# Patient Record
Sex: FEMALE | Race: BLACK OR AFRICAN AMERICAN | NOT HISPANIC OR LATINO | Employment: FULL TIME | URBAN - METROPOLITAN AREA
[De-identification: names, ages, dates, MRNs, and addresses within clinical notes are randomized per-mention and may not be internally consistent; named-entity substitution may affect disease eponyms.]

---

## 2023-02-23 LAB
ALANINE AMINOTRANSFERASE (SGPT) (U/L) IN SER/PLAS: 15 U/L (ref 7–45)
ALBUMIN (G/DL) IN SER/PLAS: 4.2 G/DL (ref 3.4–5)
ALKALINE PHOSPHATASE (U/L) IN SER/PLAS: 120 U/L (ref 33–136)
ANION GAP IN SER/PLAS: 13 MMOL/L (ref 10–20)
ASPARTATE AMINOTRANSFERASE (SGOT) (U/L) IN SER/PLAS: 14 U/L (ref 9–39)
BASOPHILS (10*3/UL) IN BLOOD BY AUTOMATED COUNT: 0.05 X10E9/L (ref 0–0.1)
BASOPHILS/100 LEUKOCYTES IN BLOOD BY AUTOMATED COUNT: 0.5 % (ref 0–2)
BILIRUBIN TOTAL (MG/DL) IN SER/PLAS: 0.6 MG/DL (ref 0–1.2)
C REACTIVE PROTEIN (MG/L) IN SER/PLAS: 0.96 MG/DL
CALCIUM (MG/DL) IN SER/PLAS: 9.9 MG/DL (ref 8.6–10.6)
CARBON DIOXIDE, TOTAL (MMOL/L) IN SER/PLAS: 28 MMOL/L (ref 21–32)
CHLORIDE (MMOL/L) IN SER/PLAS: 104 MMOL/L (ref 98–107)
CREATININE (MG/DL) IN SER/PLAS: 1.28 MG/DL (ref 0.5–1.05)
EOSINOPHILS (10*3/UL) IN BLOOD BY AUTOMATED COUNT: 0.22 X10E9/L (ref 0–0.7)
EOSINOPHILS/100 LEUKOCYTES IN BLOOD BY AUTOMATED COUNT: 2.1 % (ref 0–6)
ERYTHROCYTE DISTRIBUTION WIDTH (RATIO) BY AUTOMATED COUNT: 14.9 % (ref 11.5–14.5)
ERYTHROCYTE MEAN CORPUSCULAR HEMOGLOBIN CONCENTRATION (G/DL) BY AUTOMATED: 32.9 G/DL (ref 32–36)
ERYTHROCYTE MEAN CORPUSCULAR VOLUME (FL) BY AUTOMATED COUNT: 92 FL (ref 80–100)
ERYTHROCYTES (10*6/UL) IN BLOOD BY AUTOMATED COUNT: 4.57 X10E12/L (ref 4–5.2)
GFR FEMALE: 47 ML/MIN/1.73M2
GLUCOSE (MG/DL) IN SER/PLAS: 91 MG/DL (ref 74–99)
HEMATOCRIT (%) IN BLOOD BY AUTOMATED COUNT: 41.9 % (ref 36–46)
HEMOGLOBIN (G/DL) IN BLOOD: 13.8 G/DL (ref 12–16)
IMMATURE GRANULOCYTES/100 LEUKOCYTES IN BLOOD BY AUTOMATED COUNT: 0.3 % (ref 0–0.9)
LEUKOCYTES (10*3/UL) IN BLOOD BY AUTOMATED COUNT: 10.6 X10E9/L (ref 4.4–11.3)
LYMPHOCYTES (10*3/UL) IN BLOOD BY AUTOMATED COUNT: 3.01 X10E9/L (ref 1.2–4.8)
LYMPHOCYTES/100 LEUKOCYTES IN BLOOD BY AUTOMATED COUNT: 28.5 % (ref 13–44)
MONOCYTES (10*3/UL) IN BLOOD BY AUTOMATED COUNT: 0.95 X10E9/L (ref 0.1–1)
MONOCYTES/100 LEUKOCYTES IN BLOOD BY AUTOMATED COUNT: 9 % (ref 2–10)
NEUTROPHILS (10*3/UL) IN BLOOD BY AUTOMATED COUNT: 6.3 X10E9/L (ref 1.2–7.7)
NEUTROPHILS/100 LEUKOCYTES IN BLOOD BY AUTOMATED COUNT: 59.6 % (ref 40–80)
NRBC (PER 100 WBCS) BY AUTOMATED COUNT: 0 /100 WBC (ref 0–0)
PLATELETS (10*3/UL) IN BLOOD AUTOMATED COUNT: 315 X10E9/L (ref 150–450)
POTASSIUM (MMOL/L) IN SER/PLAS: 3.9 MMOL/L (ref 3.5–5.3)
PROTEIN TOTAL: 6.7 G/DL (ref 6.4–8.2)
SODIUM (MMOL/L) IN SER/PLAS: 141 MMOL/L (ref 136–145)
THYROTROPIN (MIU/L) IN SER/PLAS BY DETECTION LIMIT <= 0.05 MIU/L: 1.41 MIU/L (ref 0.44–3.98)
UREA NITROGEN (MG/DL) IN SER/PLAS: 19 MG/DL (ref 6–23)

## 2023-02-24 LAB — TISSUE TRANSGLUTAMINASE, IGA: <1 U/ML (ref 0–14)

## 2023-04-08 LAB
C. DIFFICILE TOXIN, PCR: NOT DETECTED
CAMPYLOBACTER GP: NOT DETECTED
NOROVIRUS GI/GII: NOT DETECTED
ROTAVIRUS A: NOT DETECTED
SALMONELLA SP.: NOT DETECTED
SHIGA TOXIN 1: NOT DETECTED
SHIGA TOXIN 2: NOT DETECTED
SHIGELLA SP.: NOT DETECTED
VIBRIO GRP.: NOT DETECTED
YERSINIA ENTEROCOLITICA: NOT DETECTED

## 2023-04-11 LAB
FAT, FECAL - NEUTRAL: NORMAL
FAT, FECAL - SPLIT: NORMAL

## 2023-04-12 LAB — PANCREATIC ELASTASE, FECAL: 689 UG/G

## 2023-10-17 PROBLEM — J32.9 CHRONIC SINUSITIS: Status: ACTIVE | Noted: 2023-10-17

## 2023-10-17 PROBLEM — G89.29 CHRONIC PAIN OF RIGHT ANKLE: Status: ACTIVE | Noted: 2023-10-17

## 2023-10-17 PROBLEM — J44.9 COPD (CHRONIC OBSTRUCTIVE PULMONARY DISEASE) (MULTI): Status: ACTIVE | Noted: 2023-10-17

## 2023-10-17 PROBLEM — M16.12 ARTHRITIS OF LEFT HIP: Status: ACTIVE | Noted: 2023-10-17

## 2023-10-17 PROBLEM — I35.1 MILD AORTIC INSUFFICIENCY: Status: ACTIVE | Noted: 2023-10-17

## 2023-10-17 PROBLEM — R42 DIZZINESS: Status: ACTIVE | Noted: 2023-10-17

## 2023-10-17 PROBLEM — M81.0 POSTMENOPAUSAL OSTEOPOROSIS: Status: ACTIVE | Noted: 2023-10-17

## 2023-10-17 PROBLEM — R91.1 LUNG NODULE: Status: ACTIVE | Noted: 2023-10-17

## 2023-10-17 PROBLEM — K57.32 DIVERTICULITIS OF RECTOSIGMOID: Status: ACTIVE | Noted: 2023-10-17

## 2023-10-17 PROBLEM — N60.99 ATYPICAL LOBULAR HYPERPLASIA OF BREAST: Status: ACTIVE | Noted: 2023-10-17

## 2023-10-17 PROBLEM — G47.33 OSA (OBSTRUCTIVE SLEEP APNEA): Status: ACTIVE | Noted: 2023-10-17

## 2023-10-17 PROBLEM — E11.9 TYPE 2 DIABETES MELLITUS (MULTI): Status: ACTIVE | Noted: 2023-10-17

## 2023-10-17 PROBLEM — M87.051: Status: ACTIVE | Noted: 2023-10-17

## 2023-10-17 PROBLEM — E79.0 ABNORMAL BLOOD LEVEL OF URIC ACID: Status: ACTIVE | Noted: 2023-10-17

## 2023-10-17 PROBLEM — M54.16 LUMBAR RADICULOPATHY: Status: ACTIVE | Noted: 2023-10-17

## 2023-10-17 PROBLEM — R79.89 ELEVATED SERUM CREATININE: Status: ACTIVE | Noted: 2023-10-17

## 2023-10-17 PROBLEM — K90.9 STEATORRHEA (HHS-HCC): Status: ACTIVE | Noted: 2023-10-17

## 2023-10-17 PROBLEM — M10.9 GOUT: Status: ACTIVE | Noted: 2023-10-17

## 2023-10-17 PROBLEM — E66.9 OBESITY (BMI 30-39.9): Status: ACTIVE | Noted: 2023-10-17

## 2023-10-17 PROBLEM — R92.8 ABNORMAL MAMMOGRAM OF LEFT BREAST: Status: ACTIVE | Noted: 2023-10-17

## 2023-10-17 PROBLEM — I25.10 ATHEROSCLEROSIS OF NATIVE CORONARY ARTERY OF NATIVE HEART WITHOUT ANGINA PECTORIS: Status: ACTIVE | Noted: 2023-10-17

## 2023-10-17 PROBLEM — L30.9 ECZEMA: Status: ACTIVE | Noted: 2023-10-17

## 2023-10-17 PROBLEM — M10.9 URIC ACID ARTHROPATHY: Status: ACTIVE | Noted: 2023-10-17

## 2023-10-17 PROBLEM — I21.4 NSTEMI, INITIAL EPISODE OF CARE (MULTI): Status: ACTIVE | Noted: 2023-10-17

## 2023-10-17 PROBLEM — R30.0 DYSURIA: Status: ACTIVE | Noted: 2023-10-17

## 2023-10-17 PROBLEM — I10 BENIGN ESSENTIAL HYPERTENSION: Status: ACTIVE | Noted: 2023-10-17

## 2023-10-17 PROBLEM — K57.90 DIVERTICULOSIS: Status: ACTIVE | Noted: 2023-10-17

## 2023-10-17 PROBLEM — R31.0 GROSS HEMATURIA: Status: ACTIVE | Noted: 2023-10-17

## 2023-10-17 PROBLEM — K63.5 POLYP OF SIGMOID COLON: Status: ACTIVE | Noted: 2023-10-17

## 2023-10-17 PROBLEM — F43.9 STRESS: Status: ACTIVE | Noted: 2023-10-17

## 2023-10-17 PROBLEM — E55.9 VITAMIN D DEFICIENCY: Status: ACTIVE | Noted: 2023-10-17

## 2023-10-17 PROBLEM — R35.0 URINE FREQUENCY: Status: ACTIVE | Noted: 2023-10-17

## 2023-10-17 PROBLEM — M87.052 AVASCULAR NECROSIS OF BONE OF LEFT HIP (MULTI): Status: ACTIVE | Noted: 2023-10-17

## 2023-10-17 PROBLEM — R31.9 HEMATURIA: Status: ACTIVE | Noted: 2023-10-17

## 2023-10-17 PROBLEM — R06.02 SHORTNESS OF BREATH ON EXERTION: Status: ACTIVE | Noted: 2023-10-17

## 2023-10-17 PROBLEM — M25.571 CHRONIC PAIN OF RIGHT ANKLE: Status: ACTIVE | Noted: 2023-10-17

## 2023-10-17 PROBLEM — R73.09 ABNORMAL BLOOD SUGAR: Status: ACTIVE | Noted: 2023-10-17

## 2023-10-17 PROBLEM — M70.62 TROCHANTERIC BURSITIS OF LEFT HIP: Status: ACTIVE | Noted: 2023-10-17

## 2023-10-17 PROBLEM — E78.5 HYPERLIPEMIA: Status: ACTIVE | Noted: 2023-10-17

## 2023-10-17 PROBLEM — F17.200 CURRENT SMOKER: Status: ACTIVE | Noted: 2023-10-17

## 2023-10-17 PROBLEM — E78.00 HYPERCHOLESTEROLEMIA: Status: ACTIVE | Noted: 2023-10-17

## 2023-10-17 PROBLEM — F17.200 NICOTINE DEPENDENCE: Status: ACTIVE | Noted: 2023-10-17

## 2023-10-17 PROBLEM — I50.32 CHRONIC DIASTOLIC CONGESTIVE HEART FAILURE (MULTI): Status: ACTIVE | Noted: 2023-10-17

## 2023-10-17 PROBLEM — I25.2 PAST MYOCARDIAL INFARCTION: Status: ACTIVE | Noted: 2023-10-17

## 2023-10-17 RX ORDER — CARVEDILOL 3.12 MG/1
1 TABLET ORAL
COMMUNITY
Start: 2022-02-07 | End: 2023-10-18 | Stop reason: ALTCHOICE

## 2023-10-17 RX ORDER — AZELASTINE 1 MG/ML
1 SPRAY, METERED NASAL 2 TIMES DAILY
COMMUNITY
Start: 2019-06-11 | End: 2024-04-09 | Stop reason: SDUPTHER

## 2023-10-17 RX ORDER — NAPROXEN 500 MG/1
500 TABLET ORAL 2 TIMES DAILY
COMMUNITY
Start: 2023-01-24 | End: 2023-10-18 | Stop reason: ALTCHOICE

## 2023-10-17 RX ORDER — EZETIMIBE 10 MG/1
1 TABLET ORAL NIGHTLY
COMMUNITY
Start: 2022-02-07 | End: 2023-10-18 | Stop reason: ALTCHOICE

## 2023-10-17 RX ORDER — ALLOPURINOL 300 MG/1
1 TABLET ORAL DAILY
COMMUNITY
Start: 2022-08-31 | End: 2024-02-06

## 2023-10-17 RX ORDER — ATORVASTATIN CALCIUM 80 MG/1
1 TABLET, FILM COATED ORAL DAILY
COMMUNITY
Start: 2022-02-07 | End: 2024-02-06

## 2023-10-17 RX ORDER — TRIAMCINOLONE ACETONIDE 1 MG/G
CREAM TOPICAL 2 TIMES DAILY
COMMUNITY
Start: 2022-05-04

## 2023-10-17 RX ORDER — FLUTICASONE FUROATE, UMECLIDINIUM BROMIDE AND VILANTEROL TRIFENATATE 200; 62.5; 25 UG/1; UG/1; UG/1
1 POWDER RESPIRATORY (INHALATION) DAILY
COMMUNITY
End: 2024-03-25 | Stop reason: SDUPTHER

## 2023-10-17 RX ORDER — IPRATROPIUM BROMIDE AND ALBUTEROL SULFATE 2.5; .5 MG/3ML; MG/3ML
3 SOLUTION RESPIRATORY (INHALATION) EVERY 6 HOURS PRN
COMMUNITY
Start: 2021-04-12

## 2023-10-17 RX ORDER — ALBUTEROL SULFATE 90 UG/1
1-2 AEROSOL, METERED RESPIRATORY (INHALATION) EVERY 4 HOURS PRN
COMMUNITY
Start: 2017-02-20 | End: 2024-03-25 | Stop reason: SDUPTHER

## 2023-10-17 RX ORDER — CHLORTHALIDONE 25 MG/1
25 TABLET ORAL DAILY
COMMUNITY
End: 2024-03-18 | Stop reason: SDUPTHER

## 2023-10-17 RX ORDER — CLOPIDOGREL BISULFATE 75 MG/1
1 TABLET ORAL DAILY
COMMUNITY
Start: 2022-02-07 | End: 2024-04-04 | Stop reason: ALTCHOICE

## 2023-10-17 RX ORDER — DORZOLAMIDE HCL 20 MG/ML
SOLUTION/ DROPS OPHTHALMIC
COMMUNITY

## 2023-10-17 RX ORDER — ASPIRIN 81 MG/1
1 TABLET ORAL DAILY
COMMUNITY
End: 2024-05-28

## 2023-10-17 RX ORDER — AMLODIPINE BESYLATE 2.5 MG/1
1 TABLET ORAL DAILY
COMMUNITY
Start: 2022-08-31 | End: 2024-03-15 | Stop reason: SDUPTHER

## 2023-10-17 RX ORDER — MECLIZINE HYDROCHLORIDE 25 MG/1
25-50 TABLET ORAL 3 TIMES DAILY PRN
COMMUNITY
Start: 2022-11-17 | End: 2023-10-18 | Stop reason: ALTCHOICE

## 2023-10-17 RX ORDER — NITROGLYCERIN 0.4 MG/1
0.4 TABLET SUBLINGUAL EVERY 5 MIN PRN
COMMUNITY
Start: 2022-02-07

## 2023-10-18 ENCOUNTER — OFFICE VISIT (OUTPATIENT)
Dept: PRIMARY CARE | Facility: CLINIC | Age: 63
End: 2023-10-18
Payer: COMMERCIAL

## 2023-10-18 VITALS
WEIGHT: 240.08 LBS | RESPIRATION RATE: 16 BRPM | HEART RATE: 85 BPM | DIASTOLIC BLOOD PRESSURE: 64 MMHG | SYSTOLIC BLOOD PRESSURE: 147 MMHG | BODY MASS INDEX: 38.75 KG/M2

## 2023-10-18 DIAGNOSIS — E11.9 TYPE 2 DIABETES MELLITUS WITHOUT COMPLICATION, WITHOUT LONG-TERM CURRENT USE OF INSULIN (MULTI): Primary | ICD-10-CM

## 2023-10-18 DIAGNOSIS — M10.9 GOUT OF ANKLE, UNSPECIFIED CAUSE, UNSPECIFIED CHRONICITY, UNSPECIFIED LATERALITY: Primary | ICD-10-CM

## 2023-10-18 DIAGNOSIS — I10 BENIGN ESSENTIAL HYPERTENSION: ICD-10-CM

## 2023-10-18 DIAGNOSIS — I25.10 ATHEROSCLEROSIS OF NATIVE CORONARY ARTERY OF NATIVE HEART WITHOUT ANGINA PECTORIS: ICD-10-CM

## 2023-10-18 DIAGNOSIS — E66.9 OBESITY (BMI 30-39.9): ICD-10-CM

## 2023-10-18 DIAGNOSIS — E78.00 HYPERCHOLESTEROLEMIA: ICD-10-CM

## 2023-10-18 DIAGNOSIS — R10.30 LOWER ABDOMINAL PAIN: ICD-10-CM

## 2023-10-18 PROBLEM — R10.2 PELVIC PAIN: Status: ACTIVE | Noted: 2023-10-18

## 2023-10-18 PROCEDURE — 1125F AMNT PAIN NOTED PAIN PRSNT: CPT | Performed by: INTERNAL MEDICINE

## 2023-10-18 PROCEDURE — 3078F DIAST BP <80 MM HG: CPT | Performed by: INTERNAL MEDICINE

## 2023-10-18 PROCEDURE — 99214 OFFICE O/P EST MOD 30 MIN: CPT | Performed by: INTERNAL MEDICINE

## 2023-10-18 PROCEDURE — 3008F BODY MASS INDEX DOCD: CPT | Performed by: INTERNAL MEDICINE

## 2023-10-18 PROCEDURE — 3077F SYST BP >= 140 MM HG: CPT | Performed by: INTERNAL MEDICINE

## 2023-10-18 RX ORDER — CLOTRIMAZOLE AND BETAMETHASONE DIPROPIONATE 10; .64 MG/G; MG/G
1 CREAM TOPICAL 2 TIMES DAILY
COMMUNITY
Start: 2023-03-29

## 2023-10-18 RX ORDER — FLUTICASONE PROPIONATE 50 MCG
1 SPRAY, SUSPENSION (ML) NASAL
COMMUNITY
Start: 2012-07-19 | End: 2024-05-30 | Stop reason: ALTCHOICE

## 2023-10-18 ASSESSMENT — ENCOUNTER SYMPTOMS
LOSS OF SENSATION IN FEET: 1
ABDOMINAL PAIN: 1
DEPRESSION: 0
DIZZINESS: 1
OCCASIONAL FEELINGS OF UNSTEADINESS: 1

## 2023-10-18 ASSESSMENT — PAIN SCALES - GENERAL: PAINLEVEL: 6

## 2023-10-18 NOTE — ASSESSMENT & PLAN NOTE
Hypertension : controlled blood pressure and continues same medications and educate a low salt diet do not exceed 200 mg per serving. Keep monitor the blood pressure at home.

## 2023-10-18 NOTE — ASSESSMENT & PLAN NOTE
Hypercholesterolemia: check  lipid profile.   Educate low cholesterol diet , avoid fast foods , processed meats and fried foods, red meat.  Increase physical activity.  Keep a low carb diet.

## 2023-10-18 NOTE — PROGRESS NOTES
Subjective   Patient ID: Isabelle Abel is a 63 y.o. female who presents for Abdominal Pain, Foot Pain, Ankle Pain, and Dizziness.    Follow-up visit.  She has hypertension hypercholesterolemia, coronary artery disease asthma, high BMI 38.  Complains of lower abdominal pain cramping.  Last colonoscopy 2019, recommended to repeat in 5 years.  Has seen gastroenterologist in consult.  Denies diarrhea or constipation, no blood in stool.  No nausea no epigastric pain.  Has dizzy spells like vertigo.  Complains of pain in her ankles.    Abdominal Pain    Ankle Pain     Dizziness  Associated symptoms include abdominal pain.        Review of Systems   Gastrointestinal:  Positive for abdominal pain.   Neurological:  Positive for dizziness.   All other systems reviewed and are negative.      Objective   /64 (BP Location: Left arm, Patient Position: Sitting)   Pulse 85   Resp 16   Wt 109 kg (240 lb 1.3 oz)   BMI 38.75 kg/m²     Physical Exam  Vitals reviewed.   Constitutional:       Appearance: She is obese.   HENT:      Head: Normocephalic.      Nose: Nose normal.   Eyes:      Extraocular Movements: Extraocular movements intact.   Cardiovascular:      Rate and Rhythm: Regular rhythm.      Pulses: Normal pulses.   Pulmonary:      Breath sounds: Normal breath sounds.   Abdominal:      General: Bowel sounds are normal.      Palpations: Abdomen is soft.      Tenderness: There is abdominal tenderness.   Musculoskeletal:         General: Normal range of motion.      Cervical back: Normal range of motion.   Skin:     General: Skin is warm.   Neurological:      General: No focal deficit present.      Mental Status: She is alert and oriented to person, place, and time. Mental status is at baseline.   Psychiatric:         Mood and Affect: Mood normal.         Assessment/Plan   Problem List Items Addressed This Visit             ICD-10-CM    Atherosclerosis of native coronary artery of native heart without angina pectoris  I25.10     Stable, no CP. Follow up with cardiologist.         Benign essential hypertension I10     Hypertension : controlled blood pressure and continues same medications and educate a low salt diet do not exceed 200 mg per serving. Keep monitor the blood pressure at home.              Relevant Orders    Comprehensive Metabolic Panel    Gout - Primary M10.9    Relevant Orders    Uric Acid    Hypercholesterolemia E78.00     Hypercholesterolemia: check  lipid profile.   Educate low cholesterol diet , avoid fast foods , processed meats and fried foods, red meat.  Increase physical activity.  Keep a low carb diet.             Relevant Orders    Lipid Panel    Obesity (BMI 30-39.9) E66.9    Relevant Orders    Hemoglobin A1C     Other Visit Diagnoses         Codes    Lower abdominal pain     R10.30    Relevant Orders    US pelvis transvaginal    CBC and Auto Differential

## 2023-10-26 ENCOUNTER — LAB (OUTPATIENT)
Dept: LAB | Facility: LAB | Age: 63
End: 2023-10-26
Payer: COMMERCIAL

## 2023-10-26 ENCOUNTER — HOSPITAL ENCOUNTER (OUTPATIENT)
Dept: RADIOLOGY | Facility: HOSPITAL | Age: 63
Discharge: HOME | End: 2023-10-26
Payer: COMMERCIAL

## 2023-10-26 DIAGNOSIS — R10.30 LOWER ABDOMINAL PAIN: ICD-10-CM

## 2023-10-26 DIAGNOSIS — I10 BENIGN ESSENTIAL HYPERTENSION: ICD-10-CM

## 2023-10-26 DIAGNOSIS — E66.9 OBESITY (BMI 30-39.9): ICD-10-CM

## 2023-10-26 DIAGNOSIS — M10.9 GOUT OF ANKLE, UNSPECIFIED CAUSE, UNSPECIFIED CHRONICITY, UNSPECIFIED LATERALITY: ICD-10-CM

## 2023-10-26 DIAGNOSIS — E78.00 HYPERCHOLESTEROLEMIA: ICD-10-CM

## 2023-10-26 LAB
ALBUMIN SERPL BCP-MCNC: 4 G/DL (ref 3.4–5)
ALP SERPL-CCNC: 100 U/L (ref 33–136)
ALT SERPL W P-5'-P-CCNC: 14 U/L (ref 7–45)
ANION GAP SERPL CALC-SCNC: 13 MMOL/L (ref 10–20)
AST SERPL W P-5'-P-CCNC: 17 U/L (ref 9–39)
BASOPHILS # BLD AUTO: 0.04 X10*3/UL (ref 0–0.1)
BASOPHILS NFR BLD AUTO: 0.4 %
BILIRUB SERPL-MCNC: 0.9 MG/DL (ref 0–1.2)
BUN SERPL-MCNC: 21 MG/DL (ref 6–23)
CALCIUM SERPL-MCNC: 9.1 MG/DL (ref 8.6–10.3)
CHLORIDE SERPL-SCNC: 101 MMOL/L (ref 98–107)
CHOLEST SERPL-MCNC: 222 MG/DL (ref 0–199)
CHOLESTEROL/HDL RATIO: 5.3
CO2 SERPL-SCNC: 26 MMOL/L (ref 21–32)
CREAT SERPL-MCNC: 1.45 MG/DL (ref 0.5–1.05)
EOSINOPHIL # BLD AUTO: 0.13 X10*3/UL (ref 0–0.7)
EOSINOPHIL NFR BLD AUTO: 1.4 %
ERYTHROCYTE [DISTWIDTH] IN BLOOD BY AUTOMATED COUNT: 14.5 % (ref 11.5–14.5)
EST. AVERAGE GLUCOSE BLD GHB EST-MCNC: 140 MG/DL
GFR SERPL CREATININE-BSD FRML MDRD: 40 ML/MIN/1.73M*2
GLUCOSE SERPL-MCNC: 100 MG/DL (ref 74–99)
HBA1C MFR BLD: 6.5 %
HCT VFR BLD AUTO: 44.1 % (ref 36–46)
HDLC SERPL-MCNC: 41.9 MG/DL
HGB BLD-MCNC: 14.3 G/DL (ref 12–16)
IMM GRANULOCYTES # BLD AUTO: 0.03 X10*3/UL (ref 0–0.7)
IMM GRANULOCYTES NFR BLD AUTO: 0.3 % (ref 0–0.9)
LDLC SERPL CALC-MCNC: 156 MG/DL
LYMPHOCYTES # BLD AUTO: 2.17 X10*3/UL (ref 1.2–4.8)
LYMPHOCYTES NFR BLD AUTO: 22.8 %
MCH RBC QN AUTO: 29 PG (ref 26–34)
MCHC RBC AUTO-ENTMCNC: 32.4 G/DL (ref 32–36)
MCV RBC AUTO: 90 FL (ref 80–100)
MONOCYTES # BLD AUTO: 0.71 X10*3/UL (ref 0.1–1)
MONOCYTES NFR BLD AUTO: 7.5 %
NEUTROPHILS # BLD AUTO: 6.42 X10*3/UL (ref 1.2–7.7)
NEUTROPHILS NFR BLD AUTO: 67.6 %
NON HDL CHOLESTEROL: 180 MG/DL (ref 0–149)
NRBC BLD-RTO: 0 /100 WBCS (ref 0–0)
PLATELET # BLD AUTO: 299 X10*3/UL (ref 150–450)
PMV BLD AUTO: 9.8 FL (ref 7.5–11.5)
POTASSIUM SERPL-SCNC: 4.1 MMOL/L (ref 3.5–5.3)
PROT SERPL-MCNC: 6.9 G/DL (ref 6.4–8.2)
RBC # BLD AUTO: 4.93 X10*6/UL (ref 4–5.2)
SODIUM SERPL-SCNC: 136 MMOL/L (ref 136–145)
TRIGL SERPL-MCNC: 119 MG/DL (ref 0–149)
URATE SERPL-MCNC: 8.8 MG/DL (ref 2.3–6.7)
VLDL: 24 MG/DL (ref 0–40)
WBC # BLD AUTO: 9.5 X10*3/UL (ref 4.4–11.3)

## 2023-10-26 PROCEDURE — 36415 COLL VENOUS BLD VENIPUNCTURE: CPT

## 2023-10-26 PROCEDURE — 84550 ASSAY OF BLOOD/URIC ACID: CPT

## 2023-10-26 PROCEDURE — 80061 LIPID PANEL: CPT

## 2023-10-26 PROCEDURE — 76856 US EXAM PELVIC COMPLETE: CPT | Performed by: RADIOLOGY

## 2023-10-26 PROCEDURE — 76830 TRANSVAGINAL US NON-OB: CPT | Performed by: RADIOLOGY

## 2023-10-26 PROCEDURE — 76830 TRANSVAGINAL US NON-OB: CPT

## 2023-10-26 PROCEDURE — 85025 COMPLETE CBC W/AUTO DIFF WBC: CPT

## 2023-10-26 PROCEDURE — 80053 COMPREHEN METABOLIC PANEL: CPT

## 2023-10-26 PROCEDURE — 83036 HEMOGLOBIN GLYCOSYLATED A1C: CPT

## 2023-10-29 DIAGNOSIS — D21.9 FIBROIDS: Primary | ICD-10-CM

## 2023-11-15 ENCOUNTER — APPOINTMENT (OUTPATIENT)
Dept: PRIMARY CARE | Facility: CLINIC | Age: 63
End: 2023-11-15
Payer: COMMERCIAL

## 2023-11-28 ENCOUNTER — OFFICE VISIT (OUTPATIENT)
Dept: OBSTETRICS AND GYNECOLOGY | Facility: CLINIC | Age: 63
End: 2023-11-28
Payer: COMMERCIAL

## 2023-11-28 VITALS
DIASTOLIC BLOOD PRESSURE: 80 MMHG | WEIGHT: 237 LBS | BODY MASS INDEX: 38.09 KG/M2 | HEIGHT: 66 IN | SYSTOLIC BLOOD PRESSURE: 120 MMHG

## 2023-11-28 DIAGNOSIS — R10.2 PELVIC PAIN: Primary | ICD-10-CM

## 2023-11-28 DIAGNOSIS — N94.10 DYSPAREUNIA IN FEMALE: ICD-10-CM

## 2023-11-28 PROCEDURE — 1125F AMNT PAIN NOTED PAIN PRSNT: CPT | Performed by: OBSTETRICS & GYNECOLOGY

## 2023-11-28 PROCEDURE — 99214 OFFICE O/P EST MOD 30 MIN: CPT | Performed by: OBSTETRICS & GYNECOLOGY

## 2023-11-28 PROCEDURE — 3079F DIAST BP 80-89 MM HG: CPT | Performed by: OBSTETRICS & GYNECOLOGY

## 2023-11-28 PROCEDURE — 3074F SYST BP LT 130 MM HG: CPT | Performed by: OBSTETRICS & GYNECOLOGY

## 2023-11-28 PROCEDURE — 1159F MED LIST DOCD IN RCRD: CPT | Performed by: OBSTETRICS & GYNECOLOGY

## 2023-11-28 PROCEDURE — 3050F LDL-C >= 130 MG/DL: CPT | Performed by: OBSTETRICS & GYNECOLOGY

## 2023-11-28 PROCEDURE — 3044F HG A1C LEVEL LT 7.0%: CPT | Performed by: OBSTETRICS & GYNECOLOGY

## 2023-11-28 ASSESSMENT — PAIN SCALES - GENERAL: PAINLEVEL: 3

## 2023-11-28 NOTE — PROGRESS NOTES
63-year-old obese -0-4-0 -American woman presents today with complaints of intermittent, severe, lower abdominal/pelvic pain.  She reports normal bowel movements.  She denies any pain association with her bowel or bladder.     Patient reports similar painful symptoms with intercourse.    She denies any abnormal discharge or vaginal bleeding.  Without nausea, vomiting, fever or chills.    She reports feeling bloated.  She states that she has been seen by GI and they have not been able to find an etiology for her pelvic pain.    GynHx reviewed from  visit.     WDWN woman in NAD   Abdomen -obese, nontender, nonacute  Pelvic NEFG, normal BUS  Vaginal atrophic w/o pain   Cervix - no CMT   Uterus - slightly enlarged, ?tenderness   Adnexa - bilateral tenderness with deep palpation     A/P: Pelvic pain w/dyspareunia and fibroids     -  Fibroid info given     -  Reviewed recent US with the patient and partner    -  SAVANAH     -  Dr. Reyes referral     RTC for ADEOLA

## 2023-11-28 NOTE — PATIENT INSTRUCTIONS
Thanks for coming in today for follow-up.    Review the information about fibroids.    Follow-up with Dr. Reyes, a minimally invasive specialist and pain management specialist in our office.    Follow-up with Pelvic Floor Physical Therapy to assist with decreasing your pelvic pain.    Follow-up with your PCP and other healthcare specialist as needed.    Return to the office for your annual GYN exam.    Feel free to call the office with any problems, questions or concerns prior to your next scheduled visit.

## 2023-11-30 ENCOUNTER — ANCILLARY PROCEDURE (OUTPATIENT)
Dept: RADIOLOGY | Facility: CLINIC | Age: 63
End: 2023-11-30
Payer: COMMERCIAL

## 2023-11-30 DIAGNOSIS — F17.210 NICOTINE DEPENDENCE, CIGARETTES, UNCOMPLICATED: ICD-10-CM

## 2023-11-30 PROCEDURE — 71271 CT THORAX LUNG CANCER SCR C-: CPT

## 2023-11-30 PROCEDURE — 71271 CT THORAX LUNG CANCER SCR C-: CPT | Performed by: RADIOLOGY

## 2023-12-27 ENCOUNTER — TELEPHONE (OUTPATIENT)
Dept: PRIMARY CARE | Facility: CLINIC | Age: 63
End: 2023-12-27
Payer: COMMERCIAL

## 2023-12-27 NOTE — PROGRESS NOTES
Division of Minimally Invasive Gynecologic Surgery  [facility]    12/27/23 Gynecology Consult     HISTORY OF PRESENT ILLNESS:  Isabelle Abel 63 y.o. P0 referred by Dr. Betancur for abdominopelvic pain, considering hysterectomy.     She has been struggling w/ pelvic and abdominal pain for the past year. Her pain comes and goes. It can be as bad as a 10/10, sometimes it's a dull ache. She does have deep dyspareunia (does use a lot of lubrication). She has no identifiable triggers or alleviating factors. She has tried NSAIDs/Tylenol w/o relief.     She does have pain with bowel movements. She has twice daily bowel movements that are not hard. She did undergo a work up with GI without an identifiable source of pain. She denies dysuria but does have symptoms of RUBY.     She does have a history of STIs and PID but has not had pain issues like her current issues in the past.     Imaging:   - Pelvic US 10/26/23 showed uterus measuring 6cm x 3cm x 5.5cm with multiple uterine fibroids. Largest measures 3cm x 2.5cm x 2cm. EMS 2mm. Bilateral ovaries WNL.   Labs:   - Recent creatinine 1.45  - A1C 10/2023 6.5%  - Recent CBC WNL    Screening:   - Last pap 2021 negative/negative  - Last mammogram 6/2023 BIRADS 1   - Last colonoscopy: 6/2023   PMHx: HTN, HLD, CAD, DM, obesity (BMI 38), vertigo (carries a cane because of this for comfort)   PSHx: Diagnostic laparoscopy, bilateral hip replacements     PAST MEDICAL HISTORY:  Past Medical History:   Diagnosis Date    Encounter for follow-up examination after completed treatment for conditions other than malignant neoplasm 05/20/2021    Hospital discharge follow-up    Encounter for gynecological examination (general) (routine) without abnormal findings 03/11/2019    Encounter for annual routine gynecological examination    Non-ST elevation (NSTEMI) myocardial infarction (CMS/HCC) 02/28/2022    Non-ST elevation myocardial infarction (NSTEMI) in recovery  phase    Personal history of other diseases of the respiratory system 2018    History of chronic obstructive lung disease    Zoster without complications 10/14/2020    Herpes zoster without complication       PAST SURGICAL HISTORY:  Past Surgical History:   Procedure Laterality Date    OTHER SURGICAL HISTORY  04/10/2014    Uterine Surgery    OTHER SURGICAL HISTORY  2019    Dilation and curettage    OTHER SURGICAL HISTORY  2019    Surgically induced     TOTAL HIP ARTHROPLASTY  2018    Hip Replacement       PHYSICAL EXAMINATION:  VITAL SIGNS: /86   Pulse 99   Wt 108 kg (237 lb 11.2 oz)   BMI 38.37 kg/m²      Constitutional:  No acute distress, well-nourished and well-developed  HEENT: EOM grossly intact, MMM, neck supple and with full ROM  Pulm:  Effort normal. No accessory muscle usage.  No respiratory distress.  Abd: soft, non-distended, non-tender, no palpable masses  :  - EGBUS: grossly WNL   - Single digit: Left LA and OI tenderness/spasm, bulbospongiosus spasm, otherwise benign  - Bimanual: No adnexal fullness or tenderness, no CMT, uterus is non-tender and small   Neurological:  She is alert and oriented to person place and time.  Skin: Warm, no pallor.  Psychiatric:  She has normal mood and affect.    ASSESSMENT:  Isabelle Abel 63 y.o. P0 referred by Dr. Betancur for abdominopelvic pain, considering hysterectomy.     We discussed possible sources of pain today, including GYN, GI, MSK, and neuro based. The uterus is unlikely to be a source of her pain given lack of uterine tenderness on exam today, small size, and the fact that fibroids shrink after menopause. If the uterus/fibroids were the cause of her symptoms, I would expect her to have experienced this pain long before the past year.     I am most suspicious for MSK cause, specifically pelvic floor dysfunction. She reports a pain level of 1-2 today and had spasm w/ mild tenderness on exam. Pelvic tension myalgia  can wax and wane and I am suspicious that her pain flares are related to intensifying muscle spasm.     PLAN:  - Recommend trial of PFPT and PRN Flexeril. Will place referral for PT, and given web site info today.   - RTC 3-6 months, earlier PRN     Janeth Reyes MD  12/27/23  1:31 PM

## 2023-12-28 ENCOUNTER — OFFICE VISIT (OUTPATIENT)
Dept: OBSTETRICS AND GYNECOLOGY | Facility: CLINIC | Age: 63
End: 2023-12-28
Payer: COMMERCIAL

## 2023-12-28 VITALS
BODY MASS INDEX: 38.37 KG/M2 | SYSTOLIC BLOOD PRESSURE: 136 MMHG | DIASTOLIC BLOOD PRESSURE: 86 MMHG | HEART RATE: 99 BPM | WEIGHT: 237.7 LBS

## 2023-12-28 DIAGNOSIS — D25.9 UTERINE LEIOMYOMA, UNSPECIFIED LOCATION: ICD-10-CM

## 2023-12-28 DIAGNOSIS — M62.89 PELVIC FLOOR DYSFUNCTION: Primary | ICD-10-CM

## 2023-12-28 DIAGNOSIS — R10.2 PELVIC PAIN: ICD-10-CM

## 2023-12-28 PROCEDURE — 3075F SYST BP GE 130 - 139MM HG: CPT | Performed by: STUDENT IN AN ORGANIZED HEALTH CARE EDUCATION/TRAINING PROGRAM

## 2023-12-28 PROCEDURE — 1036F TOBACCO NON-USER: CPT | Performed by: STUDENT IN AN ORGANIZED HEALTH CARE EDUCATION/TRAINING PROGRAM

## 2023-12-28 PROCEDURE — 3050F LDL-C >= 130 MG/DL: CPT | Performed by: STUDENT IN AN ORGANIZED HEALTH CARE EDUCATION/TRAINING PROGRAM

## 2023-12-28 PROCEDURE — 3044F HG A1C LEVEL LT 7.0%: CPT | Performed by: STUDENT IN AN ORGANIZED HEALTH CARE EDUCATION/TRAINING PROGRAM

## 2023-12-28 PROCEDURE — 99204 OFFICE O/P NEW MOD 45 MIN: CPT | Performed by: STUDENT IN AN ORGANIZED HEALTH CARE EDUCATION/TRAINING PROGRAM

## 2023-12-28 PROCEDURE — 99214 OFFICE O/P EST MOD 30 MIN: CPT | Performed by: STUDENT IN AN ORGANIZED HEALTH CARE EDUCATION/TRAINING PROGRAM

## 2023-12-28 PROCEDURE — 3079F DIAST BP 80-89 MM HG: CPT | Performed by: STUDENT IN AN ORGANIZED HEALTH CARE EDUCATION/TRAINING PROGRAM

## 2023-12-28 PROCEDURE — 1159F MED LIST DOCD IN RCRD: CPT | Performed by: STUDENT IN AN ORGANIZED HEALTH CARE EDUCATION/TRAINING PROGRAM

## 2023-12-28 PROCEDURE — 1125F AMNT PAIN NOTED PAIN PRSNT: CPT | Performed by: STUDENT IN AN ORGANIZED HEALTH CARE EDUCATION/TRAINING PROGRAM

## 2023-12-28 RX ORDER — METHOCARBAMOL 500 MG/1
500 TABLET, FILM COATED ORAL 2 TIMES DAILY PRN
Qty: 30 TABLET | Refills: 0 | Status: SHIPPED | OUTPATIENT
Start: 2023-12-28 | End: 2024-05-29 | Stop reason: ALTCHOICE

## 2023-12-28 ASSESSMENT — PAIN SCALES - GENERAL: PAINLEVEL: 5

## 2024-01-17 ENCOUNTER — APPOINTMENT (OUTPATIENT)
Dept: OBSTETRICS AND GYNECOLOGY | Facility: CLINIC | Age: 64
End: 2024-01-17
Payer: COMMERCIAL

## 2024-01-24 ENCOUNTER — HOSPITAL ENCOUNTER (OUTPATIENT)
Dept: CARDIOLOGY | Facility: HOSPITAL | Age: 64
Discharge: HOME | End: 2024-01-24
Payer: COMMERCIAL

## 2024-01-24 DIAGNOSIS — I25.2 OLD MYOCARDIAL INFARCTION: ICD-10-CM

## 2024-01-24 DIAGNOSIS — I35.1 NONRHEUMATIC AORTIC (VALVE) INSUFFICIENCY: ICD-10-CM

## 2024-01-24 PROCEDURE — 93306 TTE W/DOPPLER COMPLETE: CPT | Performed by: INTERNAL MEDICINE

## 2024-01-24 PROCEDURE — 93306 TTE W/DOPPLER COMPLETE: CPT

## 2024-01-25 ENCOUNTER — TELEPHONE (OUTPATIENT)
Dept: CARDIOLOGY | Facility: CLINIC | Age: 64
End: 2024-01-25
Payer: COMMERCIAL

## 2024-01-25 LAB
AORTIC VALVE MEAN GRADIENT: 4 MMHG
AORTIC VALVE PEAK VELOCITY: 1.44 M/S
AV PEAK GRADIENT: 8.3 MMHG
EJECTION FRACTION APICAL 4 CHAMBER: 40.5
EJECTION FRACTION: 50 %
LEFT ATRIUM VOLUME AREA LENGTH INDEX BSA: 42.7 ML/M2
LEFT VENTRICLE INTERNAL DIMENSION DIASTOLE: 3.8 CM (ref 3.5–6)
MITRAL VALVE E/A RATIO: 0.61
MITRAL VALVE E/E' RATIO: 9
RIGHT VENTRICLE FREE WALL PEAK S': 12.5 CM/S
RIGHT VENTRICLE PEAK SYSTOLIC PRESSURE: 30.2 MMHG
TRICUSPID ANNULAR PLANE SYSTOLIC EXCURSION: 1.7 CM

## 2024-01-25 NOTE — TELEPHONE ENCOUNTER
----- Message from Stephen Ceja MD sent at 1/25/2024  1:50 PM EST -----  Let her know echo looked great, EF normal AI remains mild nothing to do or change

## 2024-02-06 DIAGNOSIS — M1A.9XX0 CHRONIC GOUT WITHOUT TOPHUS, UNSPECIFIED CAUSE, UNSPECIFIED SITE: Primary | ICD-10-CM

## 2024-02-06 DIAGNOSIS — I25.10 ATHEROSCLEROSIS OF NATIVE CORONARY ARTERY OF NATIVE HEART WITHOUT ANGINA PECTORIS: Primary | ICD-10-CM

## 2024-02-06 RX ORDER — ALLOPURINOL 300 MG/1
300 TABLET ORAL DAILY
Qty: 90 TABLET | Refills: 0 | Status: SHIPPED | OUTPATIENT
Start: 2024-02-06

## 2024-02-06 RX ORDER — ATORVASTATIN CALCIUM 80 MG/1
80 TABLET, FILM COATED ORAL DAILY
Qty: 90 TABLET | Refills: 0 | Status: SHIPPED | OUTPATIENT
Start: 2024-02-06

## 2024-02-26 ENCOUNTER — HOSPITAL ENCOUNTER (OUTPATIENT)
Dept: RADIOLOGY | Facility: CLINIC | Age: 64
Discharge: HOME | End: 2024-02-26
Payer: COMMERCIAL

## 2024-02-26 ENCOUNTER — OFFICE VISIT (OUTPATIENT)
Dept: PRIMARY CARE | Facility: CLINIC | Age: 64
End: 2024-02-26
Payer: COMMERCIAL

## 2024-02-26 ENCOUNTER — TELEPHONE (OUTPATIENT)
Dept: PRIMARY CARE | Facility: CLINIC | Age: 64
End: 2024-02-26

## 2024-02-26 VITALS
DIASTOLIC BLOOD PRESSURE: 75 MMHG | TEMPERATURE: 97.1 F | SYSTOLIC BLOOD PRESSURE: 145 MMHG | HEART RATE: 101 BPM | BODY MASS INDEX: 39.21 KG/M2 | WEIGHT: 242.95 LBS | OXYGEN SATURATION: 97 %

## 2024-02-26 DIAGNOSIS — I10 BENIGN ESSENTIAL HYPERTENSION: ICD-10-CM

## 2024-02-26 DIAGNOSIS — J40 BRONCHITIS: Primary | ICD-10-CM

## 2024-02-26 DIAGNOSIS — J40 BRONCHITIS: ICD-10-CM

## 2024-02-26 PROCEDURE — 1159F MED LIST DOCD IN RCRD: CPT | Performed by: INTERNAL MEDICINE

## 2024-02-26 PROCEDURE — 3078F DIAST BP <80 MM HG: CPT | Performed by: INTERNAL MEDICINE

## 2024-02-26 PROCEDURE — 87637 SARSCOV2&INF A&B&RSV AMP PRB: CPT

## 2024-02-26 PROCEDURE — 1036F TOBACCO NON-USER: CPT | Performed by: INTERNAL MEDICINE

## 2024-02-26 PROCEDURE — 71046 X-RAY EXAM CHEST 2 VIEWS: CPT | Performed by: RADIOLOGY

## 2024-02-26 PROCEDURE — 71046 X-RAY EXAM CHEST 2 VIEWS: CPT

## 2024-02-26 PROCEDURE — 1125F AMNT PAIN NOTED PAIN PRSNT: CPT | Performed by: INTERNAL MEDICINE

## 2024-02-26 PROCEDURE — 3077F SYST BP >= 140 MM HG: CPT | Performed by: INTERNAL MEDICINE

## 2024-02-26 PROCEDURE — 1160F RVW MEDS BY RX/DR IN RCRD: CPT | Performed by: INTERNAL MEDICINE

## 2024-02-26 PROCEDURE — 99213 OFFICE O/P EST LOW 20 MIN: CPT | Performed by: INTERNAL MEDICINE

## 2024-02-26 RX ORDER — BENZONATATE 200 MG/1
200 CAPSULE ORAL 3 TIMES DAILY PRN
Qty: 42 CAPSULE | Refills: 0 | Status: SHIPPED | OUTPATIENT
Start: 2024-02-26 | End: 2024-03-27

## 2024-02-26 RX ORDER — PREDNISONE 10 MG/1
TABLET ORAL
Qty: 30 TABLET | Refills: 0 | Status: SHIPPED | OUTPATIENT
Start: 2024-02-26 | End: 2024-03-07

## 2024-02-26 ASSESSMENT — ENCOUNTER SYMPTOMS
HEADACHES: 1
COUGH: 1
SHORTNESS OF BREATH: 1

## 2024-02-26 ASSESSMENT — PAIN SCALES - GENERAL: PAINLEVEL: 10-WORST PAIN EVER

## 2024-02-26 NOTE — PATIENT INSTRUCTIONS
Have chest x-ray today.   medication from pharmacy, take as directed.    Will call you tomorrow with results of PCR test for flu influenza and RSV.  Follow-up in 1 month

## 2024-02-26 NOTE — PROGRESS NOTES
Subjective   Patient ID: Isabelle Abel is a 63 y.o. female who presents for Cough, Headache, Nasal Congestion, and Shortness of Breath.    Follow-up visit.  She complains of cough headaches, SOB,  she has had fever yesterday after returning from [place] last week.  She tested for COVID twice, results negative.  She has COPD.    Cough  Associated symptoms include headaches and shortness of breath.   Headache   Associated symptoms include coughing.   Shortness of Breath  Associated symptoms include headaches.        Review of Systems   Respiratory:  Positive for cough and shortness of breath.    Neurological:  Positive for headaches.   All other systems reviewed and are negative.      Objective   /75 (BP Location: Left arm, Patient Position: Sitting)   Pulse 101   Temp 36.2 °C (97.1 °F) (Temporal)   Wt 110 kg (242 lb 15.2 oz)   SpO2 97%   BMI 39.21 kg/m²     Physical Exam  Constitutional:       Appearance: Normal appearance. She is obese.   HENT:      Head: Normocephalic and atraumatic.      Right Ear: External ear normal.      Left Ear: External ear normal.      Mouth/Throat:      Mouth: Mucous membranes are moist.      Pharynx: Oropharynx is clear.   Neck:      Vascular: No carotid bruit.   Cardiovascular:      Rate and Rhythm: Normal rate and regular rhythm.      Heart sounds: No murmur heard.     No gallop.   Pulmonary:      Effort: Pulmonary effort is normal. No respiratory distress.      Breath sounds: No wheezing or rales.      Comments: Decreased breath sounds rt. lung  Abdominal:      General: Abdomen is flat.      Palpations: Abdomen is soft.      Hernia: No hernia is present.   Musculoskeletal:         General: No swelling, tenderness, deformity or signs of injury.      Cervical back: No rigidity or tenderness.      Right lower leg: No edema.   Lymphadenopathy:      Cervical: No cervical adenopathy.   Skin:     Coloration: Skin is not jaundiced or pale.      Findings: No bruising, erythema,  lesion or rash.   Neurological:      General: No focal deficit present.      Mental Status: She is oriented to person, place, and time.      Motor: No weakness.      Coordination: Coordination normal.   Psychiatric:         Mood and Affect: Mood normal.         Behavior: Behavior normal.         Assessment/Plan

## 2024-02-26 NOTE — ASSESSMENT & PLAN NOTE
Take antihypertensive medications daily as directed.  Follow-up low-sodium diet do not exceed 200 mg per serving.

## 2024-02-26 NOTE — ASSESSMENT & PLAN NOTE
Acute bronchitis, advised to use inhalers as directed.  Prescription sent to pharmacy for Tessalon Perles 200 mg to take 3 times a day as needed for the next week.  Prednisone 10 mg tapering dose prescription was sent to the pharmacy.  Have nasal swab PCR for COVID influenza and RSV.  Will call you with results tomorrow.

## 2024-02-27 DIAGNOSIS — J40 BRONCHITIS: Primary | ICD-10-CM

## 2024-02-27 LAB
FLUAV RNA RESP QL NAA+PROBE: NOT DETECTED
FLUBV RNA RESP QL NAA+PROBE: NOT DETECTED
RSV RNA RESP QL NAA+PROBE: NOT DETECTED
SARS-COV-2 RNA RESP QL NAA+PROBE: NOT DETECTED

## 2024-02-27 RX ORDER — AZITHROMYCIN 250 MG/1
TABLET, FILM COATED ORAL
Qty: 6 TABLET | Refills: 0 | Status: SHIPPED | OUTPATIENT
Start: 2024-02-27 | End: 2024-03-03

## 2024-03-04 ENCOUNTER — APPOINTMENT (OUTPATIENT)
Dept: CARDIOLOGY | Facility: HOSPITAL | Age: 64
End: 2024-03-04
Payer: COMMERCIAL

## 2024-03-04 ENCOUNTER — APPOINTMENT (OUTPATIENT)
Dept: RADIOLOGY | Facility: HOSPITAL | Age: 64
End: 2024-03-04
Payer: COMMERCIAL

## 2024-03-04 PROCEDURE — 71045 X-RAY EXAM CHEST 1 VIEW: CPT

## 2024-03-04 PROCEDURE — 96365 THER/PROPH/DIAG IV INF INIT: CPT

## 2024-03-04 PROCEDURE — 71045 X-RAY EXAM CHEST 1 VIEW: CPT | Performed by: RADIOLOGY

## 2024-03-04 PROCEDURE — 93005 ELECTROCARDIOGRAM TRACING: CPT

## 2024-03-04 PROCEDURE — 99284 EMERGENCY DEPT VISIT MOD MDM: CPT | Mod: 25

## 2024-03-04 ASSESSMENT — COLUMBIA-SUICIDE SEVERITY RATING SCALE - C-SSRS
6. HAVE YOU EVER DONE ANYTHING, STARTED TO DO ANYTHING, OR PREPARED TO DO ANYTHING TO END YOUR LIFE?: NO
1. IN THE PAST MONTH, HAVE YOU WISHED YOU WERE DEAD OR WISHED YOU COULD GO TO SLEEP AND NOT WAKE UP?: NO
2. HAVE YOU ACTUALLY HAD ANY THOUGHTS OF KILLING YOURSELF?: NO

## 2024-03-05 ENCOUNTER — HOSPITAL ENCOUNTER (EMERGENCY)
Facility: HOSPITAL | Age: 64
Discharge: HOME | End: 2024-03-05
Payer: COMMERCIAL

## 2024-03-05 VITALS
WEIGHT: 245.26 LBS | BODY MASS INDEX: 40.86 KG/M2 | SYSTOLIC BLOOD PRESSURE: 124 MMHG | DIASTOLIC BLOOD PRESSURE: 71 MMHG | HEART RATE: 81 BPM | RESPIRATION RATE: 18 BRPM | HEIGHT: 65 IN | OXYGEN SATURATION: 98 % | TEMPERATURE: 96.5 F

## 2024-03-05 DIAGNOSIS — R06.02 SOB (SHORTNESS OF BREATH): Primary | ICD-10-CM

## 2024-03-05 LAB
ALBUMIN SERPL BCP-MCNC: 3.8 G/DL (ref 3.4–5)
ALP SERPL-CCNC: 89 U/L (ref 33–136)
ALT SERPL W P-5'-P-CCNC: 20 U/L (ref 7–45)
ANION GAP SERPL CALC-SCNC: 16 MMOL/L (ref 10–20)
APPEARANCE UR: CLEAR
AST SERPL W P-5'-P-CCNC: 16 U/L (ref 9–39)
ATRIAL RATE: 104 BPM
BASOPHILS # BLD AUTO: 0.03 X10*3/UL (ref 0–0.1)
BASOPHILS NFR BLD AUTO: 0.2 %
BILIRUB SERPL-MCNC: 0.5 MG/DL (ref 0–1.2)
BILIRUB UR STRIP.AUTO-MCNC: NEGATIVE MG/DL
BNP SERPL-MCNC: 17 PG/ML (ref 0–99)
BUN SERPL-MCNC: 23 MG/DL (ref 6–23)
CALCIUM SERPL-MCNC: 9.3 MG/DL (ref 8.6–10.3)
CARDIAC TROPONIN I PNL SERPL HS: 8 NG/L (ref 0–13)
CHLORIDE SERPL-SCNC: 102 MMOL/L (ref 98–107)
CO2 SERPL-SCNC: 24 MMOL/L (ref 21–32)
COLOR UR: YELLOW
CREAT SERPL-MCNC: 1.21 MG/DL (ref 0.5–1.05)
D DIMER PPP FEU-MCNC: 344 NG/ML FEU
EGFRCR SERPLBLD CKD-EPI 2021: 50 ML/MIN/1.73M*2
EOSINOPHIL # BLD AUTO: 0.03 X10*3/UL (ref 0–0.7)
EOSINOPHIL NFR BLD AUTO: 0.2 %
ERYTHROCYTE [DISTWIDTH] IN BLOOD BY AUTOMATED COUNT: 15.9 % (ref 11.5–14.5)
FLUAV RNA RESP QL NAA+PROBE: NOT DETECTED
FLUBV RNA RESP QL NAA+PROBE: NOT DETECTED
GLUCOSE SERPL-MCNC: 195 MG/DL (ref 74–99)
GLUCOSE UR STRIP.AUTO-MCNC: NEGATIVE MG/DL
HCT VFR BLD AUTO: 43.4 % (ref 36–46)
HGB BLD-MCNC: 13.7 G/DL (ref 12–16)
IMM GRANULOCYTES # BLD AUTO: 0.1 X10*3/UL (ref 0–0.7)
IMM GRANULOCYTES NFR BLD AUTO: 0.8 % (ref 0–0.9)
KETONES UR STRIP.AUTO-MCNC: NEGATIVE MG/DL
LEUKOCYTE ESTERASE UR QL STRIP.AUTO: ABNORMAL
LYMPHOCYTES # BLD AUTO: 3.53 X10*3/UL (ref 1.2–4.8)
LYMPHOCYTES NFR BLD AUTO: 26.6 %
MCH RBC QN AUTO: 28.9 PG (ref 26–34)
MCHC RBC AUTO-ENTMCNC: 31.6 G/DL (ref 32–36)
MCV RBC AUTO: 92 FL (ref 80–100)
MONOCYTES # BLD AUTO: 0.88 X10*3/UL (ref 0.1–1)
MONOCYTES NFR BLD AUTO: 6.6 %
NEUTROPHILS # BLD AUTO: 8.68 X10*3/UL (ref 1.2–7.7)
NEUTROPHILS NFR BLD AUTO: 65.6 %
NITRITE UR QL STRIP.AUTO: NEGATIVE
NRBC BLD-RTO: 0 /100 WBCS (ref 0–0)
P AXIS: 52 DEGREES
P OFFSET: 182 MS
P ONSET: 132 MS
PH UR STRIP.AUTO: 5 [PH]
PLATELET # BLD AUTO: 339 X10*3/UL (ref 150–450)
POTASSIUM SERPL-SCNC: 4.7 MMOL/L (ref 3.5–5.3)
PR INTERVAL: 168 MS
PROT SERPL-MCNC: 6.5 G/DL (ref 6.4–8.2)
PROT UR STRIP.AUTO-MCNC: NEGATIVE MG/DL
Q ONSET: 216 MS
QRS COUNT: 17 BEATS
QRS DURATION: 74 MS
QT INTERVAL: 368 MS
QTC CALCULATION(BAZETT): 483 MS
QTC FREDERICIA: 442 MS
R AXIS: -65 DEGREES
RBC # BLD AUTO: 4.74 X10*6/UL (ref 4–5.2)
RBC # UR STRIP.AUTO: ABNORMAL /UL
RBC #/AREA URNS AUTO: NORMAL /HPF
RSV RNA RESP QL NAA+PROBE: NOT DETECTED
SARS-COV-2 RNA RESP QL NAA+PROBE: NOT DETECTED
SODIUM SERPL-SCNC: 137 MMOL/L (ref 136–145)
SP GR UR STRIP.AUTO: 1.02
T AXIS: 36 DEGREES
T OFFSET: 400 MS
UROBILINOGEN UR STRIP.AUTO-MCNC: <2 MG/DL
VENTRICULAR RATE: 104 BPM
WBC # BLD AUTO: 13.3 X10*3/UL (ref 4.4–11.3)
WBC #/AREA URNS AUTO: NORMAL /HPF

## 2024-03-05 PROCEDURE — 85379 FIBRIN DEGRADATION QUANT: CPT | Performed by: PHYSICIAN ASSISTANT

## 2024-03-05 PROCEDURE — 36415 COLL VENOUS BLD VENIPUNCTURE: CPT | Performed by: PHYSICIAN ASSISTANT

## 2024-03-05 PROCEDURE — 2500000004 HC RX 250 GENERAL PHARMACY W/ HCPCS (ALT 636 FOR OP/ED): Performed by: PHYSICIAN ASSISTANT

## 2024-03-05 PROCEDURE — 85025 COMPLETE CBC W/AUTO DIFF WBC: CPT | Performed by: PHYSICIAN ASSISTANT

## 2024-03-05 PROCEDURE — 80053 COMPREHEN METABOLIC PANEL: CPT | Performed by: PHYSICIAN ASSISTANT

## 2024-03-05 PROCEDURE — 87040 BLOOD CULTURE FOR BACTERIA: CPT | Mod: AHULAB | Performed by: PHYSICIAN ASSISTANT

## 2024-03-05 PROCEDURE — 83880 ASSAY OF NATRIURETIC PEPTIDE: CPT | Performed by: PHYSICIAN ASSISTANT

## 2024-03-05 PROCEDURE — 87637 SARSCOV2&INF A&B&RSV AMP PRB: CPT | Performed by: PHYSICIAN ASSISTANT

## 2024-03-05 PROCEDURE — 84484 ASSAY OF TROPONIN QUANT: CPT | Performed by: PHYSICIAN ASSISTANT

## 2024-03-05 PROCEDURE — 81001 URINALYSIS AUTO W/SCOPE: CPT | Performed by: PHYSICIAN ASSISTANT

## 2024-03-05 RX ORDER — CEFTRIAXONE 1 G/50ML
1 INJECTION, SOLUTION INTRAVENOUS ONCE
Status: COMPLETED | OUTPATIENT
Start: 2024-03-05 | End: 2024-03-05

## 2024-03-05 RX ORDER — CEFTAZIDIME 2 G/1
1 INJECTION, POWDER, FOR SOLUTION INTRAVENOUS ONCE
Status: DISCONTINUED | OUTPATIENT
Start: 2024-03-05 | End: 2024-03-05

## 2024-03-05 RX ORDER — AMOXICILLIN AND CLAVULANATE POTASSIUM 875; 125 MG/1; MG/1
1 TABLET, FILM COATED ORAL EVERY 12 HOURS
Qty: 10 TABLET | Refills: 0 | Status: SHIPPED | OUTPATIENT
Start: 2024-03-05 | End: 2024-03-10

## 2024-03-05 RX ADMIN — CEFTRIAXONE SODIUM 1 G: 1 INJECTION, SOLUTION INTRAVENOUS at 04:05

## 2024-03-05 NOTE — ED PROVIDER NOTES
Chief Complaint   Patient presents with    Shortness of Breath     C/o sob r/t  COPD states she took at home breathing treatments and steroids with no relief      HPI:   Isabelle Abel is an 63 y.o. female complicated PMH including COPD, HTN, HLD, NSTEMI, ADILSON (not on CPAP) presents to the ED for evaluation of shortness of breath.  Patient says she has been short of breath for about a week and a half.  She saw her primary care provider and was given prescription for steroids and Tessalon Perles.  She has been taking them without relief.  She says she is having to use her nebulizer at least 2 or 3 times per day and it is not helping.  She endorses occasional chest tightness with coughing but denies any chest pain, palpitations, hemoptysis, leg swelling, abdominal pain.  She states that her cough is productive of green sputum.  She denies any sick contacts.  No numbness, tingling, extremity weakness, back pain, headache, fever or chills    Medications:  Soc HX: Former smoker  Allergies   Allergen Reactions    Ciprofloxacin Hcl Itching   :  Past Medical History:   Diagnosis Date    Encounter for follow-up examination after completed treatment for conditions other than malignant neoplasm 05/20/2021    Hospital discharge follow-up    Encounter for gynecological examination (general) (routine) without abnormal findings 03/11/2019    Encounter for annual routine gynecological examination    Non-ST elevation (NSTEMI) myocardial infarction (CMS/[facility]) 02/28/2022    Non-ST elevation myocardial infarction (NSTEMI) in recovery phase    Personal history of other diseases of the respiratory system 05/19/2018    History of chronic obstructive lung disease    Zoster without complications 10/14/2020    Herpes zoster without complication     Past Surgical History:   Procedure Laterality Date    OTHER SURGICAL HISTORY  04/10/2014    Uterine Surgery    OTHER SURGICAL HISTORY  03/11/2019    Dilation and curettage    OTHER SURGICAL HISTORY   2019    Surgically induced     TOTAL HIP ARTHROPLASTY  2018    Hip Replacement     Family History   Problem Relation Name Age of Onset    Other (abdominal aneurysm) Mother      Heart attack Mother      Alzheimer's disease Father      Transient ischemic attack Sister      Breast cancer Other mat relatives     Breast cancer Sibling      Diabetes Maternal Cousin        Physical Exam  Vitals and nursing note reviewed.   Constitutional:       General: She is not in acute distress.     Appearance: Normal appearance. She is not ill-appearing or toxic-appearing.      Comments: Elevated BMI   HENT:      Head: Normocephalic and atraumatic.      Right Ear: External ear normal.      Left Ear: External ear normal.      Mouth/Throat:      Pharynx: No oropharyngeal exudate.      Comments: Uvula midline.  Mucous membranes moist.  Eyes:      Pupils: Pupils are equal, round, and reactive to light.   Neck:      Vascular: No JVD.   Cardiovascular:      Rate and Rhythm: Normal rate and regular rhythm.      Pulses: Normal pulses.      Heart sounds: No murmur heard.     Comments: Patient was tachycardic during triage however on auscultation she is no longer tachycardic.  Negative Vick bilaterally.  Pulmonary:      Effort: Pulmonary effort is normal. No respiratory distress.      Breath sounds: Examination of the left-lower field reveals rales. Rales present.   Chest:      Chest wall: No mass, tenderness or crepitus.   Abdominal:      General: Bowel sounds are normal.      Palpations: Abdomen is soft.      Tenderness: There is no abdominal tenderness. There is no guarding or rebound.   Musculoskeletal:         General: No deformity or signs of injury. Normal range of motion.      Cervical back: Normal range of motion.   Lymphadenopathy:      Cervical: Cervical adenopathy present.   Skin:     General: Skin is warm and dry.      Capillary Refill: Capillary refill takes less than 2 seconds.      Findings: No bruising or  rash.   Neurological:      Mental Status: She is alert.      Cranial Nerves: No cranial nerve deficit.     VS: As documented in the triage note and EMR flowsheet from this visit were reviewed.    External Records Reviewed: I reviewed recent and relevant outside records including: Reviewed PCP note 2/26/2024.  Patient seen for cough headache shortness of breath and nasal congestionShe had a chest x-ray which did not show pneumonia.  COVID flu and RSV were negative.      EKG INTERPRETATION:      Personally Reviewed      Rhythm: Sinus tachycardia      Rate:   104 bpm     Axis: LAD     Intervals:  Normal [place] interval 168 ms     QRS Complex:  Normal        QT Interval: QTc 483 ms          Medical Decision Making:   ED Course as of 03/05/24 0451   Tue Mar 05, 2024   0341 Vitals Reviewed: Afebrile. Normotensive. Not tachycardic nor tachypneic. No hypoxia.   [KA]   0341 Patient's labs were ordered in triage prior to being assessed by myself.  I personally viewed labs.  RSV COVID and flu negative.  BNP normal.  Urine trace amount of leuks otherwise no signs of infection.  Dimer negative.  No PE.  CMP shows creatinine 1.21 which is on the good side of patient's baseline.  Normal electrolytes, normal liver function.  CBC shows leukocytosis 13.3 with stable hemoglobin of 13.7.  Radiology read chest x-ray is no acute pneumonia. [KA]   4220 Patient is a pleasant 63-year-old female presents to the ED for evaluation of shortness of breath x 1-1/2 weeks.  She saw her primary care provider a week ago and was initiated on Medrol Dosepak and Tessalon Perles that has not been working.  On exam she has Rales left lower lobe.  She send cough is productive of green sputum.  Chest x-ray was ordered during triage and is 1 view.  Negative for pneumonia.  Given her history I feel would be reasonable to treat for pneumonia.  I feel that her leukocytosis is most likely secondary to her steroid use.  Will obtain troponin and if that is normal  patient will be discharged home on outpatient antibiotics.  Will receive 1 g of Rocephin in the ED. [KA]   0445 Troponin is normal.  Patient will be discharged home on course of Augmentin.  Advise should she complete full course of antibiotics and follow-up with primary care provider. [KA]      ED Course User Index  [KA] Lucero Seth PA-C         Diagnoses as of 03/05/24 0451   SOB (shortness of breath)      Escalation of Care: Appropriate for outpatient management     Counseling: Spoke with the patient and discussed today´s findings, in addition to providing specific details for the plan of care and expected course.  Patient was given the opportunity to ask questions.    Discussed return precautions and importance of follow-up.  Advised to follow-up with primary care provider.  Advised to return to the ED for changing or worsening symptoms, new symptoms, complaint specific precautions, and precautions listed on the discharge paperwork.  Educated on the common potential side effects of medications prescribed.    I advised the patient that the emergency evaluation and treatment provided today doesn't end their need for medical care. It is very important that they follow-up with their primary care provider or other specialist as instructed.    The plan of care was mutually agreed upon with the patient. The patient and/or family were given the opportunity to ask questions. All questions asked today in the ED were answered to the best of my ability with today's information.    I specifically advised the patient to return to the ED for changing or worsening symptoms, worrisome new symptoms, or for any complaint specific precautions listed on the discharge paperwork.    This patient was cared for in the setting of nationwide stress on resources and staffing.    This report was transcribed using voice recognition software.  Every effort was made to ensure accuracy, however, inadvertently computerized transcription  errors may be present.       Lucero Seth PA-C  03/05/24 0459

## 2024-03-05 NOTE — DISCHARGE INSTRUCTIONS
Please begin taking Augmentin.  Take this medication twice per day for the next 5 days.  Complete full course of antibiotics even if symptoms improve.  Follow-up with your primary care provider within the next 1 to 3 days.  If symptoms worsen, or you develop any new or concerning symptoms return to nearest ER.

## 2024-03-09 LAB
BACTERIA BLD CULT: NORMAL
BACTERIA BLD CULT: NORMAL

## 2024-03-15 DIAGNOSIS — I10 BENIGN ESSENTIAL HYPERTENSION: ICD-10-CM

## 2024-03-17 RX ORDER — AMLODIPINE BESYLATE 2.5 MG/1
2.5 TABLET ORAL DAILY
Qty: 90 TABLET | Refills: 1 | Status: SHIPPED | OUTPATIENT
Start: 2024-03-17 | End: 2024-03-18 | Stop reason: SDUPTHER

## 2024-03-18 DIAGNOSIS — I10 BENIGN ESSENTIAL HYPERTENSION: ICD-10-CM

## 2024-03-18 RX ORDER — AMLODIPINE BESYLATE 2.5 MG/1
2.5 TABLET ORAL DAILY
Qty: 90 TABLET | Refills: 1 | Status: SHIPPED | OUTPATIENT
Start: 2024-03-18 | End: 2024-09-14

## 2024-03-18 RX ORDER — CHLORTHALIDONE 25 MG/1
25 TABLET ORAL DAILY
Qty: 90 TABLET | Refills: 1 | Status: SHIPPED | OUTPATIENT
Start: 2024-03-18 | End: 2024-05-29

## 2024-03-25 DIAGNOSIS — J44.9 CHRONIC OBSTRUCTIVE PULMONARY DISEASE, UNSPECIFIED COPD TYPE (MULTI): Primary | ICD-10-CM

## 2024-03-25 NOTE — TELEPHONE ENCOUNTER
Patient requesting a refill on Trelegy and Albuterol. Patient last seen in clinic 02/16/2023. Has appointment scheduled for 05/2024, requesting earlier appointment. No availability before then.    Please advise..

## 2024-03-25 NOTE — TELEPHONE ENCOUNTER
----- Message from Isabelle Abel sent at 3/25/2024  1:56 PM EDT -----  Regarding: Rescue Inhaler and Trilogy liana  Contact: [phone]  I was sick with pneumonia for 2 weeks. I could not get an appointment until May. I tried to speak with office staff but I had no number. I need to see you before May. Also I need a refill for rescue inhaler and Trilogy   Sent to mail order [facility]. Name Isabelle Abel  56   Phone number: [phone]   Or work cell : [phone]

## 2024-03-31 ENCOUNTER — PATIENT MESSAGE (OUTPATIENT)
Dept: PULMONOLOGY | Facility: CLINIC | Age: 64
End: 2024-03-31
Payer: COMMERCIAL

## 2024-04-01 RX ORDER — ALBUTEROL SULFATE 90 UG/1
1-2 AEROSOL, METERED RESPIRATORY (INHALATION) EVERY 4 HOURS PRN
Qty: 18 G | Refills: 1 | Status: SHIPPED | OUTPATIENT
Start: 2024-04-01 | End: 2024-05-30 | Stop reason: SDUPTHER

## 2024-04-01 RX ORDER — FLUTICASONE FUROATE, UMECLIDINIUM BROMIDE AND VILANTEROL TRIFENATATE 200; 62.5; 25 UG/1; UG/1; UG/1
1 POWDER RESPIRATORY (INHALATION) DAILY
Qty: 1 EACH | Refills: 1 | Status: SHIPPED | OUTPATIENT
Start: 2024-04-01 | End: 2024-05-30 | Stop reason: SDUPTHER

## 2024-04-03 ENCOUNTER — TELEPHONE (OUTPATIENT)
Dept: PRIMARY CARE | Facility: CLINIC | Age: 64
End: 2024-04-03

## 2024-04-03 ENCOUNTER — APPOINTMENT (OUTPATIENT)
Dept: PRIMARY CARE | Facility: CLINIC | Age: 64
End: 2024-04-03
Payer: COMMERCIAL

## 2024-04-04 ENCOUNTER — OFFICE VISIT (OUTPATIENT)
Dept: PRIMARY CARE | Facility: CLINIC | Age: 64
End: 2024-04-04
Payer: COMMERCIAL

## 2024-04-04 ENCOUNTER — LAB (OUTPATIENT)
Dept: LAB | Facility: LAB | Age: 64
End: 2024-04-04
Payer: COMMERCIAL

## 2024-04-04 ENCOUNTER — APPOINTMENT (OUTPATIENT)
Dept: PRIMARY CARE | Facility: CLINIC | Age: 64
End: 2024-04-04
Payer: COMMERCIAL

## 2024-04-04 VITALS
DIASTOLIC BLOOD PRESSURE: 87 MMHG | HEART RATE: 94 BPM | TEMPERATURE: 98.2 F | SYSTOLIC BLOOD PRESSURE: 142 MMHG | OXYGEN SATURATION: 94 % | BODY MASS INDEX: 39.65 KG/M2 | HEIGHT: 65 IN | WEIGHT: 238 LBS

## 2024-04-04 DIAGNOSIS — E11.9 TYPE 2 DIABETES MELLITUS WITHOUT COMPLICATION, WITHOUT LONG-TERM CURRENT USE OF INSULIN (MULTI): ICD-10-CM

## 2024-04-04 DIAGNOSIS — E78.00 HYPERCHOLESTEROLEMIA: ICD-10-CM

## 2024-04-04 DIAGNOSIS — R42 VERTIGO: Primary | ICD-10-CM

## 2024-04-04 DIAGNOSIS — I10 BENIGN ESSENTIAL HYPERTENSION: ICD-10-CM

## 2024-04-04 DIAGNOSIS — Z23 NEED FOR VACCINATION: ICD-10-CM

## 2024-04-04 DIAGNOSIS — E11.9 CONTROLLED TYPE 2 DIABETES MELLITUS WITHOUT COMPLICATION, WITHOUT LONG-TERM CURRENT USE OF INSULIN (MULTI): ICD-10-CM

## 2024-04-04 DIAGNOSIS — Z12.31 OTHER SCREENING MAMMOGRAM: ICD-10-CM

## 2024-04-04 LAB
ALBUMIN SERPL BCP-MCNC: 4 G/DL (ref 3.4–5)
ALP SERPL-CCNC: 104 U/L (ref 33–136)
ALT SERPL W P-5'-P-CCNC: 18 U/L (ref 7–45)
ANION GAP SERPL CALC-SCNC: 14 MMOL/L (ref 10–20)
AST SERPL W P-5'-P-CCNC: 17 U/L (ref 9–39)
BASOPHILS # BLD AUTO: 0.06 X10*3/UL (ref 0–0.1)
BASOPHILS NFR BLD AUTO: 0.7 %
BILIRUB SERPL-MCNC: 0.8 MG/DL (ref 0–1.2)
BUN SERPL-MCNC: 19 MG/DL (ref 6–23)
CALCIUM SERPL-MCNC: 9.9 MG/DL (ref 8.6–10.6)
CHLORIDE SERPL-SCNC: 104 MMOL/L (ref 98–107)
CHOLEST SERPL-MCNC: 199 MG/DL (ref 0–199)
CHOLESTEROL/HDL RATIO: 5.1
CO2 SERPL-SCNC: 29 MMOL/L (ref 21–32)
CREAT SERPL-MCNC: 1.26 MG/DL (ref 0.5–1.05)
EGFRCR SERPLBLD CKD-EPI 2021: 47 ML/MIN/1.73M*2
EOSINOPHIL # BLD AUTO: 0.16 X10*3/UL (ref 0–0.7)
EOSINOPHIL NFR BLD AUTO: 1.8 %
ERYTHROCYTE [DISTWIDTH] IN BLOOD BY AUTOMATED COUNT: 14.4 % (ref 11.5–14.5)
EST. AVERAGE GLUCOSE BLD GHB EST-MCNC: 143 MG/DL
GLUCOSE SERPL-MCNC: 117 MG/DL (ref 74–99)
HBA1C MFR BLD: 6.6 %
HCT VFR BLD AUTO: 45.3 % (ref 36–46)
HDLC SERPL-MCNC: 39 MG/DL
HGB BLD-MCNC: 14.6 G/DL (ref 12–16)
IMM GRANULOCYTES # BLD AUTO: 0.03 X10*3/UL (ref 0–0.7)
IMM GRANULOCYTES NFR BLD AUTO: 0.3 % (ref 0–0.9)
LDLC SERPL CALC-MCNC: 140 MG/DL
LYMPHOCYTES # BLD AUTO: 2.66 X10*3/UL (ref 1.2–4.8)
LYMPHOCYTES NFR BLD AUTO: 29.2 %
MCH RBC QN AUTO: 29.1 PG (ref 26–34)
MCHC RBC AUTO-ENTMCNC: 32.2 G/DL (ref 32–36)
MCV RBC AUTO: 90 FL (ref 80–100)
MONOCYTES # BLD AUTO: 0.72 X10*3/UL (ref 0.1–1)
MONOCYTES NFR BLD AUTO: 7.9 %
NEUTROPHILS # BLD AUTO: 5.48 X10*3/UL (ref 1.2–7.7)
NEUTROPHILS NFR BLD AUTO: 60.1 %
NON HDL CHOLESTEROL: 160 MG/DL (ref 0–149)
NRBC BLD-RTO: 0 /100 WBCS (ref 0–0)
PLATELET # BLD AUTO: 355 X10*3/UL (ref 150–450)
POC HEMOGLOBIN A1C: 6.8 % (ref 4.2–6.5)
POTASSIUM SERPL-SCNC: 4.1 MMOL/L (ref 3.5–5.3)
PROT SERPL-MCNC: 6.7 G/DL (ref 6.4–8.2)
RBC # BLD AUTO: 5.01 X10*6/UL (ref 4–5.2)
SODIUM SERPL-SCNC: 143 MMOL/L (ref 136–145)
TRIGL SERPL-MCNC: 100 MG/DL (ref 0–149)
VLDL: 20 MG/DL (ref 0–40)
WBC # BLD AUTO: 9.1 X10*3/UL (ref 4.4–11.3)

## 2024-04-04 PROCEDURE — 3044F HG A1C LEVEL LT 7.0%: CPT | Performed by: INTERNAL MEDICINE

## 2024-04-04 PROCEDURE — 36415 COLL VENOUS BLD VENIPUNCTURE: CPT

## 2024-04-04 PROCEDURE — 99214 OFFICE O/P EST MOD 30 MIN: CPT | Performed by: INTERNAL MEDICINE

## 2024-04-04 PROCEDURE — 80061 LIPID PANEL: CPT

## 2024-04-04 PROCEDURE — 3079F DIAST BP 80-89 MM HG: CPT | Performed by: INTERNAL MEDICINE

## 2024-04-04 PROCEDURE — 1160F RVW MEDS BY RX/DR IN RCRD: CPT | Performed by: INTERNAL MEDICINE

## 2024-04-04 PROCEDURE — 3077F SYST BP >= 140 MM HG: CPT | Performed by: INTERNAL MEDICINE

## 2024-04-04 PROCEDURE — 90471 IMMUNIZATION ADMIN: CPT | Performed by: INTERNAL MEDICINE

## 2024-04-04 PROCEDURE — 85025 COMPLETE CBC W/AUTO DIFF WBC: CPT

## 2024-04-04 PROCEDURE — 1036F TOBACCO NON-USER: CPT | Performed by: INTERNAL MEDICINE

## 2024-04-04 PROCEDURE — 83036 HEMOGLOBIN GLYCOSYLATED A1C: CPT | Performed by: INTERNAL MEDICINE

## 2024-04-04 PROCEDURE — 80053 COMPREHEN METABOLIC PANEL: CPT

## 2024-04-04 PROCEDURE — 3050F LDL-C >= 130 MG/DL: CPT | Performed by: INTERNAL MEDICINE

## 2024-04-04 PROCEDURE — 1159F MED LIST DOCD IN RCRD: CPT | Performed by: INTERNAL MEDICINE

## 2024-04-04 PROCEDURE — 83036 HEMOGLOBIN GLYCOSYLATED A1C: CPT

## 2024-04-04 PROCEDURE — 90677 PCV20 VACCINE IM: CPT | Performed by: INTERNAL MEDICINE

## 2024-04-04 RX ORDER — MECLIZINE HYDROCHLORIDE 25 MG/1
25 TABLET ORAL 3 TIMES DAILY PRN
Qty: 30 TABLET | Refills: 11 | Status: SHIPPED | OUTPATIENT
Start: 2024-04-04 | End: 2025-04-04

## 2024-04-04 RX ORDER — METFORMIN HYDROCHLORIDE 500 MG/1
500 TABLET ORAL
Qty: 90 TABLET | Refills: 1 | Status: SHIPPED | OUTPATIENT
Start: 2024-04-04 | End: 2024-10-01

## 2024-04-04 ASSESSMENT — ENCOUNTER SYMPTOMS
DEPRESSION: 0
LOSS OF SENSATION IN FEET: 0
DIZZINESS: 1
OCCASIONAL FEELINGS OF UNSTEADINESS: 0

## 2024-04-04 ASSESSMENT — PATIENT HEALTH QUESTIONNAIRE - PHQ9
1. LITTLE INTEREST OR PLEASURE IN DOING THINGS: NOT AT ALL
2. FEELING DOWN, DEPRESSED OR HOPELESS: NOT AT ALL
SUM OF ALL RESPONSES TO PHQ9 QUESTIONS 1 AND 2: 0

## 2024-04-05 NOTE — ASSESSMENT & PLAN NOTE
Hypertension : controlled blood pressure and continues same medications and educate a low salt diet do not exceed 200 mg per serving. Keep monitor the blood pressure at home.   Did not take antihypertensive medications today yet.

## 2024-04-05 NOTE — PROGRESS NOTES
"Subjective   Patient ID: Isabelle Abel is a 63 y.o. female who presents for Dizziness.    Follow-up visit.  She has hypertension, hypercholesterolemia, diabetes type 2 diet controlled, coronary artery disease, COPD, BMI 39.  She has history of vertigo.  She complains of dizziness when she gets up in the morning when she bends over to put her shoes on.  Sometimes she is dizzy when standing, she takes cane with her for stability.    Dizziness         Review of Systems   Neurological:  Positive for dizziness.   All other systems reviewed and are negative.      Objective   /87 (BP Location: Left arm, Patient Position: Sitting)   Pulse 94   Temp 36.8 °C (98.2 °F)   Ht 1.651 m (5' 5\")   Wt 108 kg (238 lb)   SpO2 94%   BMI 39.61 kg/m²     Physical Exam  Constitutional:       Appearance: Normal appearance. She is obese.   HENT:      Head: Normocephalic and atraumatic.      Right Ear: External ear normal.      Left Ear: External ear normal.      Mouth/Throat:      Mouth: Mucous membranes are moist.      Pharynx: Oropharynx is clear.   Neck:      Vascular: No carotid bruit.   Cardiovascular:      Rate and Rhythm: Normal rate and regular rhythm.      Heart sounds: No murmur heard.     No gallop.   Pulmonary:      Effort: Pulmonary effort is normal. No respiratory distress.      Breath sounds: No wheezing or rales.   Abdominal:      General: Abdomen is flat.      Palpations: Abdomen is soft.      Hernia: No hernia is present.   Musculoskeletal:         General: No swelling, tenderness, deformity or signs of injury.      Cervical back: No rigidity or tenderness.      Right lower leg: No edema.   Lymphadenopathy:      Cervical: No cervical adenopathy.   Skin:     Coloration: Skin is not jaundiced or pale.      Findings: No bruising, erythema, lesion or rash.   Neurological:      General: No focal deficit present.      Mental Status: She is oriented to person, place, and time.      Motor: No weakness.      " Coordination: Coordination normal.   Psychiatric:         Mood and Affect: Mood normal.         Behavior: Behavior normal.         Assessment/Plan   Problem List Items Addressed This Visit             ICD-10-CM    Benign essential hypertension I10     Hypertension : controlled blood pressure and continues same medications and educate a low salt diet do not exceed 200 mg per serving. Keep monitor the blood pressure at home.   Did not take antihypertensive medications today yet.           Vertigo - Primary R42     Referral made for physical therapy vestibular exercises.  Prescription sent to pharmacy for antibiotic to take as needed.         Relevant Medications    meclizine (Antivert) 25 mg tablet    Other Relevant Orders    Referral to Physical Therapy    Hypercholesterolemia E78.00     Hypercholesterolemia: check lipid profile.   Educate low cholesterol diet , avoid fast foods , processed meats and fried foods, red meat.  Increase physical activity.  Keep a low carb diet.             Relevant Orders    Lipid Panel (Completed)    Type 2 diabetes mellitus (CMS/[facility]) E11.9     Hemoglobin A1c done in the office was 6.8 up from 6.5.  Advised to follow diabetic diet, avoid sweets rice potatoes pasta sweet drinks.  Start metformin 500 mg with dinner.         Need for vaccination Z23     Received Prevnar 20 vaccine today.         Relevant Orders    Pneumococcal conjugate vaccine, 20-valent (PREVNAR 20) (Completed)     Other Visit Diagnoses         Codes    Controlled type 2 diabetes mellitus without complication, without long-term current use of insulin (CMS/[facility])     E11.9    Relevant Medications    metFORMIN (Glucophage) 500 mg tablet    Other Relevant Orders    POCT glycosylated hemoglobin (Hb A1C) manually resulted (Completed)    Comprehensive Metabolic Panel (Completed)    Other screening mammogram     Z12.31    Relevant Orders    BI mammo bilateral screening tomosynthesis

## 2024-04-05 NOTE — ASSESSMENT & PLAN NOTE
Referral made for physical therapy vestibular exercises.  Prescription sent to pharmacy for antibiotic to take as needed.

## 2024-04-08 DIAGNOSIS — R42 VERTIGO: Primary | ICD-10-CM

## 2024-04-09 DIAGNOSIS — J32.9 CHRONIC SINUSITIS, UNSPECIFIED LOCATION: Primary | ICD-10-CM

## 2024-04-09 NOTE — TELEPHONE ENCOUNTER
Patient requesting refill of Azelastine, she will be seen in clinic 5/30/2024. She was last seen in clinic 2/2023.

## 2024-04-11 RX ORDER — AZELASTINE 1 MG/ML
1 SPRAY, METERED NASAL 2 TIMES DAILY
Qty: 30 ML | Refills: 0 | Status: SHIPPED | OUTPATIENT
Start: 2024-04-11 | End: 2024-05-30 | Stop reason: SDUPTHER

## 2024-04-22 ENCOUNTER — CLINICAL SUPPORT (OUTPATIENT)
Dept: PHYSICAL THERAPY | Facility: CLINIC | Age: 64
End: 2024-04-22
Payer: COMMERCIAL

## 2024-04-22 VITALS — SYSTOLIC BLOOD PRESSURE: 129 MMHG | HEART RATE: 97 BPM | DIASTOLIC BLOOD PRESSURE: 68 MMHG

## 2024-04-22 DIAGNOSIS — R42 VERTIGO: ICD-10-CM

## 2024-04-22 DIAGNOSIS — R26.89 IMBALANCE: Primary | ICD-10-CM

## 2024-04-22 PROCEDURE — 97112 NEUROMUSCULAR REEDUCATION: CPT | Mod: GP | Performed by: PHYSICAL THERAPIST

## 2024-04-22 PROCEDURE — 97161 PT EVAL LOW COMPLEX 20 MIN: CPT | Mod: GP | Performed by: PHYSICAL THERAPIST

## 2024-04-22 PROCEDURE — 97535 SELF CARE MNGMENT TRAINING: CPT | Mod: GP | Performed by: PHYSICAL THERAPIST

## 2024-04-22 ASSESSMENT — ENCOUNTER SYMPTOMS
DEPRESSION: 0
OCCASIONAL FEELINGS OF UNSTEADINESS: 1
LOSS OF SENSATION IN FEET: 1

## 2024-04-22 ASSESSMENT — PAIN - FUNCTIONAL ASSESSMENT: PAIN_FUNCTIONAL_ASSESSMENT: 0-10

## 2024-04-22 NOTE — PROGRESS NOTES
Physical Therapy    Physical Therapy Evaluation and Treatment      Patient Name: Isabelle Abel  MRN: [id number]  Today's Date: 4/23/2024  [facility]  POC end date: 7/21/24  PT Evaluation Time Entry  PT Evaluation (Low) Time Entry: 35  PT Therapeutic Procedures Time Entry  Neuromuscular Re-Education Time Entry: 15  Self-Care/Home Mgmt Training: 10  Time in: 1505  Time out: 1605    Assessment:  Patient is a 63 year old female referred to [facility]'s outpatient physical therapy services with a chief complaint of lightheadedness, imbalance and episodic vertigo.  Ms. Abel is reporting lightheadedness with position changes, however pt. Was negative for orthostatic BP changes.  Suspect that the patient's report of lightheadedness may be more of an imbalance.  The patient is also reporting episodic vertigo with eye movements, however these symptoms were not reproducible during today's evaluation. Encouraged the patient to follow up with ENT for further vestibular testing.  The patient reports numbness/tingling in her feet that have been going on for the past month, plan on performing sensory testing next visit.  The patient demonstrated difficulty with eyes closed on firm and foam surfaces, suggestive of decreased utilization of proprioceptive/vestibular cues for balance.  Pt. With impaired convergence particularly with right eye, however pt. Has had vision deficits in right eye since she was younger.  Ms. Abel will benefit from Vestibular physical therapy to improve balance, provide education and decrease fall risk in her home and the community.          Plan: sensory testing, FGA  OP PT Plan  Treatment/Interventions: Cryotherapy, Education/ Instruction, Gait training, Hot pack, Manual therapy, Neuromuscular re-education, Self care/ home management, Therapeutic activities, Therapeutic exercises, Taping techniques  PT Plan: Skilled PT  PT Frequency: 1 time per week  Duration: 10 visits  Onset Date:  04/04/24  Certification Period Start Date: 04/22/24  Certification Period End Date: 07/21/24  Rehab Potential: Good  Plan of Care Agreement: Patient    Current Problem:   1. Imbalance        2. Vertigo  Referral to Physical Therapy    Follow Up In Physical Therapy          Subjective      Pt. Reports that her dizziness started 10 years ago. She feels dizzy when she stands still, not a lot but a little. She also gets dizzy when she changes positions from sitting to standing.  She doesn't think the room spins, but she feels like she spins.  She could be just sitting or standing there and she has to hold onto something or sit down or she will feel like she will fall. The first big vertigo spell was a long time ago.  She had really bad vertigo 10 years ago and they showed her manuevers, but she hasn't had that kind of attack in a while. These spells now only last 1-2 minutes.  Since she eliminated the BP pills she hasn't been getting the spells.  She thinks that if she gets up too fast that will make her lightheaded.  She feels more lightheaded.  They gave her metformin because she is borderline diabetic.  Her pre-diabetes is still pre-diabetes but gave her metformin. She has cut her carbs because she doesn't want diabetes.  Her lightheadedness has gotten worse in the past couple of years.  She feels like her balance is off.  The other day she went to  a line without something and she felt really off balance.  She feels lightheaded when she has to bend over.  At work 2-3 weeks ago she had a spell of dizziness where her eyes moved, this spell didn't last long and then she sat down, she has had 2 spells like this in the past month, she denies pressure/fullness in her ears.  Lately she has gotten headaches but it goes away and it lasts 10 minutes.      CLOF: uses cane often in the past 2 years in the community, doesn't use device in her home    Home Setup: 1 flight to basement with 1 railing, railing with  "3STE  Equipment: [facility]   Hobbies: used to have hobbies but can't do all those things, used to bread puppies, she used to swim but done this due to dizziness,used to walk her bulldog but scared   Physical Activity: used to but hasn't felt safe  Occupation:   Sleep: \"night owl\"  Hydration: \"a lot\"  Falls: none  Pt. Goal: \"get rid of dizziness\"  Precautions:  MI, DMII, CHF, HTN, COPD        Precautions: MI, DMII, CHF, HTN, COPD      Vital Signs: sittin/57 HR=89 BPM  Standin/68, HR=97 BPM     Pain: 0/10       Objective   Vestibular assessment:  Falls= none  sensory deficits= numbness/tingling in her feet and this started last month  visual deficits=bifocals, poor vision in right eye from a young age  hearing loss=WNL  cervical ROM:  R rotation=47  L rotation= 47  Flexion=35  Extension=58  Ocular ROM= wnl  Gaze evoked nystagmus (With fixation)= WNL  Saccades= WNL  Smooth pursuits= WNL  Spontaneous nystagmus= N/A  Cover/uncover test= WNL  VOR cancellation=WNL  Head thrust test= WNL  Convergence= 12.5CM, decreased convergence noted with R eye  Gait: wide JANETTE, reduced bilateral heelstrike with SPC      Modified Clinical Sensory Integration Test  Condition One: Eyes Open, Firm Surface  Total Time: __30_____ / 30 sec  Condition Two: Eyes Closed, Firm Surface  Total Time: __18_____ / 30 sec  Condition Three: Eyes Open, Foam Surface  Total Time: __30_____ / 30 sec  Condition Four: Eyes Closed, Foam Surface  Total Time: __2_____ / 30 sec        Outcome Measures:  Other Measures  Dizziness Handicap Inventory: 32     Treatments:    Neuro Re-Ed:  Skilled vestibular specific education was provided to patient on three balance systems including vestibular system, visual system and balance systems and how an impairment of one or more of these systems can cause dizziness or imbalance.  Discussed how vestibular based therapy treatment can address these deficits.   Educated pt. On her current presentation being " "multifactorial including balance impairment from sensory deficits and there being another cause for episodic vertigo. Discussed keeping her ENT appointment for further medical evaluation.      Self Care:  Educated pt. On utilizing proper lighting at night to reduce fall risk.  Educated pt. On utilizing SPC for functional mobility to reduce fall risk  Educated pt. On proper hydration to improve function of many systems of the body including cardiovascular, vestibular, musculoskeletal  Educated pt. That if she has an episode of vertigo that persists that she should go to the ED    EDUCATION:  See flowsheet/treatment session       Goals:  Active       PT Problem       PT Goal 1       Start:  04/23/24    Expected End:  07/21/24       Pt. Will improve DHI by 18 points to meet MCID for significant change, indicate reduced dizziness and improved QOL.         PT Goal 2       Start:  04/23/24    Expected End:  07/21/24       Pt. Will improve EC on foam condition of MCTSIB to indicate improved utilization of somatosensory and vestibular cues for balance.         PT Goal 3       Start:  04/23/24    Expected End:  07/21/24       Pt. Will be able to  line at the grocery store without feelign off balance by end of POC to improve QOL, confidence with participation in this task.         PT Goal 4       Start:  04/23/24    Expected End:  07/21/24       Pt. Will be independent in HEP in next 1-2 visits to promote self efficacy, manage symptoms and meet other LTG.          PT Goal 5       Start:  04/23/24    Expected End:  07/21/24       Pt. Will complete further balance testing in next 1-2 visits.         Patient Stated Goal 1       Start:  04/23/24    Expected End:  07/21/24       Pt. Goal: \"get rid of dizziness\"                   "

## 2024-04-23 ENCOUNTER — TELEPHONE (OUTPATIENT)
Dept: PRIMARY CARE | Facility: CLINIC | Age: 64
End: 2024-04-23
Payer: COMMERCIAL

## 2024-04-23 PROBLEM — R26.89 IMBALANCE: Status: ACTIVE | Noted: 2024-04-23

## 2024-04-25 DIAGNOSIS — E11.9 TYPE 2 DIABETES MELLITUS WITHOUT COMPLICATION, WITHOUT LONG-TERM CURRENT USE OF INSULIN (MULTI): Primary | ICD-10-CM

## 2024-04-25 RX ORDER — BLOOD-GLUCOSE CONTROL, NORMAL
2 EACH MISCELLANEOUS DAILY
Qty: 200 EACH | Refills: 3 | Status: SHIPPED | OUTPATIENT
Start: 2024-04-25

## 2024-04-25 RX ORDER — BLOOD SUGAR DIAGNOSTIC
2 STRIP MISCELLANEOUS DAILY
Qty: 200 STRIP | Refills: 3 | Status: SHIPPED | OUTPATIENT
Start: 2024-04-25 | End: 2024-10-22

## 2024-04-25 RX ORDER — DEXTROSE 4 G
TABLET,CHEWABLE ORAL
Qty: 1 EACH | Refills: 0 | Status: SHIPPED | OUTPATIENT
Start: 2024-04-25

## 2024-05-10 ENCOUNTER — APPOINTMENT (OUTPATIENT)
Dept: PHYSICAL THERAPY | Facility: CLINIC | Age: 64
End: 2024-05-10
Payer: COMMERCIAL

## 2024-05-10 NOTE — PROGRESS NOTES
Physical Therapy    Physical Therapy Treatment    Patient Name: Isabelle Abel  MRN: [id number]  Today's Date: 5/10/2024         Assessment:     Plan:       Current Problem  No diagnosis found.    General          Subjective    Precautions     Vital Signs     Pain       Objective             Outcome Measures:  {PT Outcome Measures:14808}  Treatments:  {PT Treatments:93548}    OP EDUCATION:       Goals:

## 2024-05-14 ENCOUNTER — HOSPITAL ENCOUNTER (EMERGENCY)
Facility: HOSPITAL | Age: 64
Discharge: HOME | End: 2024-05-14
Attending: EMERGENCY MEDICINE
Payer: COMMERCIAL

## 2024-05-14 ENCOUNTER — APPOINTMENT (OUTPATIENT)
Dept: CARDIOLOGY | Facility: HOSPITAL | Age: 64
End: 2024-05-14
Payer: COMMERCIAL

## 2024-05-14 ENCOUNTER — APPOINTMENT (OUTPATIENT)
Dept: RADIOLOGY | Facility: HOSPITAL | Age: 64
End: 2024-05-14
Payer: COMMERCIAL

## 2024-05-14 VITALS
HEIGHT: 65 IN | OXYGEN SATURATION: 100 % | TEMPERATURE: 98.8 F | BODY MASS INDEX: 39.61 KG/M2 | RESPIRATION RATE: 16 BRPM | DIASTOLIC BLOOD PRESSURE: 79 MMHG | SYSTOLIC BLOOD PRESSURE: 135 MMHG | HEART RATE: 58 BPM

## 2024-05-14 DIAGNOSIS — K57.92 DIVERTICULITIS: Primary | ICD-10-CM

## 2024-05-14 LAB
ALBUMIN SERPL BCP-MCNC: 4.4 G/DL (ref 3.4–5)
ALP SERPL-CCNC: 87 U/L (ref 33–136)
ALT SERPL W P-5'-P-CCNC: 14 U/L (ref 7–45)
ANION GAP SERPL CALC-SCNC: 16 MMOL/L (ref 10–20)
APPEARANCE UR: CLEAR
AST SERPL W P-5'-P-CCNC: 14 U/L (ref 9–39)
BASOPHILS # BLD AUTO: 0.04 X10*3/UL (ref 0–0.1)
BASOPHILS NFR BLD AUTO: 0.4 %
BILIRUB SERPL-MCNC: 0.9 MG/DL (ref 0–1.2)
BILIRUB UR STRIP.AUTO-MCNC: NEGATIVE MG/DL
BUN SERPL-MCNC: 16 MG/DL (ref 6–23)
CALCIUM SERPL-MCNC: 9.3 MG/DL (ref 8.6–10.3)
CARDIAC TROPONIN I PNL SERPL HS: 3 NG/L (ref 0–13)
CHLORIDE SERPL-SCNC: 106 MMOL/L (ref 98–107)
CO2 SERPL-SCNC: 22 MMOL/L (ref 21–32)
COLOR UR: ABNORMAL
CREAT SERPL-MCNC: 1.28 MG/DL (ref 0.5–1.05)
EGFRCR SERPLBLD CKD-EPI 2021: 47 ML/MIN/1.73M*2
EOSINOPHIL # BLD AUTO: 0.12 X10*3/UL (ref 0–0.7)
EOSINOPHIL NFR BLD AUTO: 1.2 %
ERYTHROCYTE [DISTWIDTH] IN BLOOD BY AUTOMATED COUNT: 14.2 % (ref 11.5–14.5)
GLUCOSE SERPL-MCNC: 107 MG/DL (ref 74–99)
GLUCOSE UR STRIP.AUTO-MCNC: NORMAL MG/DL
HCT VFR BLD AUTO: 46.1 % (ref 36–46)
HGB BLD-MCNC: 15 G/DL (ref 12–16)
IMM GRANULOCYTES # BLD AUTO: 0.03 X10*3/UL (ref 0–0.7)
IMM GRANULOCYTES NFR BLD AUTO: 0.3 % (ref 0–0.9)
KETONES UR STRIP.AUTO-MCNC: NEGATIVE MG/DL
LACTATE SERPL-SCNC: 1.9 MMOL/L (ref 0.4–2)
LEUKOCYTE ESTERASE UR QL STRIP.AUTO: NEGATIVE
LIPASE SERPL-CCNC: 17 U/L (ref 9–82)
LYMPHOCYTES # BLD AUTO: 2.64 X10*3/UL (ref 1.2–4.8)
LYMPHOCYTES NFR BLD AUTO: 26.6 %
MCH RBC QN AUTO: 29 PG (ref 26–34)
MCHC RBC AUTO-ENTMCNC: 32.5 G/DL (ref 32–36)
MCV RBC AUTO: 89 FL (ref 80–100)
MONOCYTES # BLD AUTO: 0.67 X10*3/UL (ref 0.1–1)
MONOCYTES NFR BLD AUTO: 6.7 %
MUCOUS THREADS #/AREA URNS AUTO: ABNORMAL /LPF
NEUTROPHILS # BLD AUTO: 6.44 X10*3/UL (ref 1.2–7.7)
NEUTROPHILS NFR BLD AUTO: 64.8 %
NITRITE UR QL STRIP.AUTO: NEGATIVE
NRBC BLD-RTO: 0 /100 WBCS (ref 0–0)
PH UR STRIP.AUTO: 5.5 [PH]
PLATELET # BLD AUTO: 266 X10*3/UL (ref 150–450)
POTASSIUM SERPL-SCNC: 4.5 MMOL/L (ref 3.5–5.3)
PROT SERPL-MCNC: 6.5 G/DL (ref 6.4–8.2)
PROT UR STRIP.AUTO-MCNC: NEGATIVE MG/DL
RBC # BLD AUTO: 5.17 X10*6/UL (ref 4–5.2)
RBC # UR STRIP.AUTO: ABNORMAL /UL
RBC #/AREA URNS AUTO: ABNORMAL /HPF
SODIUM SERPL-SCNC: 139 MMOL/L (ref 136–145)
SP GR UR STRIP.AUTO: 1.01
UROBILINOGEN UR STRIP.AUTO-MCNC: NORMAL MG/DL
WBC # BLD AUTO: 9.9 X10*3/UL (ref 4.4–11.3)
WBC #/AREA URNS AUTO: ABNORMAL /HPF

## 2024-05-14 PROCEDURE — 96375 TX/PRO/DX INJ NEW DRUG ADDON: CPT

## 2024-05-14 PROCEDURE — 83605 ASSAY OF LACTIC ACID: CPT | Performed by: EMERGENCY MEDICINE

## 2024-05-14 PROCEDURE — 96361 HYDRATE IV INFUSION ADD-ON: CPT

## 2024-05-14 PROCEDURE — 85025 COMPLETE CBC W/AUTO DIFF WBC: CPT | Performed by: EMERGENCY MEDICINE

## 2024-05-14 PROCEDURE — 36415 COLL VENOUS BLD VENIPUNCTURE: CPT | Performed by: EMERGENCY MEDICINE

## 2024-05-14 PROCEDURE — 2550000001 HC RX 255 CONTRASTS: Performed by: EMERGENCY MEDICINE

## 2024-05-14 PROCEDURE — 99285 EMERGENCY DEPT VISIT HI MDM: CPT | Mod: 25

## 2024-05-14 PROCEDURE — 96374 THER/PROPH/DIAG INJ IV PUSH: CPT

## 2024-05-14 PROCEDURE — 2500000004 HC RX 250 GENERAL PHARMACY W/ HCPCS (ALT 636 FOR OP/ED): Performed by: EMERGENCY MEDICINE

## 2024-05-14 PROCEDURE — 93005 ELECTROCARDIOGRAM TRACING: CPT

## 2024-05-14 PROCEDURE — 81001 URINALYSIS AUTO W/SCOPE: CPT | Performed by: EMERGENCY MEDICINE

## 2024-05-14 PROCEDURE — 80053 COMPREHEN METABOLIC PANEL: CPT | Performed by: EMERGENCY MEDICINE

## 2024-05-14 PROCEDURE — 83690 ASSAY OF LIPASE: CPT | Performed by: EMERGENCY MEDICINE

## 2024-05-14 PROCEDURE — 74177 CT ABD & PELVIS W/CONTRAST: CPT | Performed by: RADIOLOGY

## 2024-05-14 PROCEDURE — 74177 CT ABD & PELVIS W/CONTRAST: CPT

## 2024-05-14 PROCEDURE — 84484 ASSAY OF TROPONIN QUANT: CPT | Performed by: EMERGENCY MEDICINE

## 2024-05-14 PROCEDURE — 2500000001 HC RX 250 WO HCPCS SELF ADMINISTERED DRUGS (ALT 637 FOR MEDICARE OP): Performed by: EMERGENCY MEDICINE

## 2024-05-14 RX ORDER — AMOXICILLIN AND CLAVULANATE POTASSIUM 875; 125 MG/1; MG/1
1 TABLET, FILM COATED ORAL EVERY 8 HOURS
Qty: 21 TABLET | Refills: 0 | Status: SHIPPED | OUTPATIENT
Start: 2024-05-14 | End: 2024-05-21

## 2024-05-14 RX ORDER — AMOXICILLIN AND CLAVULANATE POTASSIUM 875; 125 MG/1; MG/1
1 TABLET, FILM COATED ORAL ONCE
Status: COMPLETED | OUTPATIENT
Start: 2024-05-14 | End: 2024-05-14

## 2024-05-14 RX ORDER — FAMOTIDINE 10 MG/ML
20 INJECTION INTRAVENOUS ONCE
Status: COMPLETED | OUTPATIENT
Start: 2024-05-14 | End: 2024-05-14

## 2024-05-14 RX ORDER — KETOROLAC TROMETHAMINE 30 MG/ML
15 INJECTION, SOLUTION INTRAMUSCULAR; INTRAVENOUS ONCE
Status: COMPLETED | OUTPATIENT
Start: 2024-05-14 | End: 2024-05-14

## 2024-05-14 RX ADMIN — SODIUM CHLORIDE, POTASSIUM CHLORIDE, SODIUM LACTATE AND CALCIUM CHLORIDE 500 ML: 600; 310; 30; 20 INJECTION, SOLUTION INTRAVENOUS at 12:01

## 2024-05-14 RX ADMIN — FAMOTIDINE 20 MG: 10 INJECTION, SOLUTION INTRAVENOUS at 12:01

## 2024-05-14 RX ADMIN — AMOXICILLIN AND CLAVULANATE POTASSIUM 1 TABLET: 875; 125 TABLET, FILM COATED ORAL at 15:50

## 2024-05-14 RX ADMIN — IOHEXOL 75 ML: 350 INJECTION, SOLUTION INTRAVENOUS at 14:40

## 2024-05-14 RX ADMIN — KETOROLAC TROMETHAMINE 15 MG: 30 INJECTION, SOLUTION INTRAMUSCULAR at 14:23

## 2024-05-14 ASSESSMENT — PAIN DESCRIPTION - LOCATION
LOCATION: ABDOMEN
LOCATION: ABDOMEN

## 2024-05-14 ASSESSMENT — PAIN SCALES - GENERAL
PAINLEVEL_OUTOF10: 8
PAINLEVEL_OUTOF10: 3
PAINLEVEL_OUTOF10: 3
PAINLEVEL_OUTOF10: 5 - MODERATE PAIN

## 2024-05-14 ASSESSMENT — PAIN - FUNCTIONAL ASSESSMENT: PAIN_FUNCTIONAL_ASSESSMENT: 0-10

## 2024-05-14 ASSESSMENT — PAIN DESCRIPTION - ORIENTATION: ORIENTATION: MID

## 2024-05-14 NOTE — DISCHARGE INSTRUCTIONS
You were seen emergency for evaluation of abdominal pain.  Your imaging was consistent with diverticulitis.  Please take antibiotics as prescribed.  Please return if you have worsening symptoms include but not limited to fever, worsening pain, significant fatigue, or if you have any other concerns.

## 2024-05-14 NOTE — ED TRIAGE NOTES
Patient ambulatory to ED with complaint of mid abdominal pain that started Thursday. Patient states she has been having this pain intermittently over the past year. Pt has seen gastro and they had no diagnosis for her. Denies N/V/D/fevers/urinary symptoms.

## 2024-05-14 NOTE — ED PROVIDER NOTES
History/Exam limitations: none.   Additional history was obtained from patient.          HPI:    Isabelle Abel is a 63 y.o. female PMH hypertension, hyperlipidemia, gout, CAD presenting with abdominal pain x 5 days.  Patient states that she has had intermittent recurrent abdominal pain over the last year that feels similar.  States that she been worked up multiple times by OB/GYN, GI without clear cause.  No headache or vision changes, shortness breath, chest pain, nausea, vomit, diarrhea, dysuria.  Pain is near her umbilicus and radiates across bilateral sides of her abdomen.  No tearing type sensation to the back.  No weakness, numbness.          Physical Exam:  ED Triage Vitals   Temperature Heart Rate Respirations BP   05/14/24 1017 05/14/24 1017 05/14/24 1017 05/14/24 1017   37 °C (98.6 °F) (!) 105 18 105/73      Pulse Ox Temp Source Heart Rate Source Patient Position   05/14/24 1017 05/14/24 1107 -- 05/14/24 1107   97 % Oral  Lying      BP Location FiO2 (%)     05/14/24 1107 --     Right arm         GEN:      Alert, NAD  Eyes:       PERRL, EOMI  HENT:      NC/AT, OP clear, airway patent, MM  CV:      RRR, no MRG, no LE pitting edema, 2+ radial and pedal pulses  PULM:     CTAB, no w/r/r, easy WOB, symmetric chest rise  ABD:      Soft, NT, ND, no masses, BS +  :       No CVA TTP  NEURO:   A&Ox3, no focal deficits    MSK:      FROM, no joint deformities or swelling, no e/o trauma  SKIN:       Warm and dry  PSYCH:    Appropriate mood and affect         MDM/ED Course:   Isabelle Abel is a 63 y.o. female PMH hypertension, hyperlipidemia, gout, CAD presenting with abdominal pain x 5 days.  Triage vitals remarkable for tachycardia.  Exam as documented above.  Differential for abdominal pain includes but is not limited to SBO, incarcerated hernia, pancreatitis, viscous organ rupture, mesenteric ischemia, aortic pathology, biliary pathology, diverticulitis, appendicitis, UTI/pyelonephritis, nephrolithiasis, viral  illness.  IV placed and labs drawn.  Patient was given IV Pepcid, 500 cc IV fluids.  Patient is in recovery and defers Dilaudid given this, 15 mg IV Toradol given with significant treatment symptoms.  CBC with no leukocytosis or left shift.  Chemistry with baseline renal function overall reassuring.  Troponin negative unlikely atypical ACS.  Lactate normal.  Lipase normal.  Urinalysis overall reassuring, scant blood no signs of UTI.  CT abdomen pelvis with IV contrast displays acute sigmoid diverticulitis, no other acute intra-abdominal pathology.  Patient felt markedly improved and comfortable discharge, she would like to defer any narcotic pain medications with plans to treat pain with over-the-counter medications at home.  Has a ciprofloxacin allergy, plan to treat with Augmentin, first dose given here.  Prescription for Augmentin given.  Given clinical stability, labs, vitals, believe trial of outpatient management is reasonable.  Tachycardia resolved.  Discussed dietary modifications.  Patient was explained findings, exam/study results, the importance of follow up and the plan moving forward; patient verbalized understanding and agreement. All questions were answered and no new questions at this time. Patient will be discharged with strict return precautions, follow up plan with PCP.    EKG reviewed by me: 12:05 PM normal sinus rhythm, rate 89, left axis, normal intervals, no STEMI, relatively poor baseline, no acute ischemic changes, similar to prior.    EKG reviewed by me: 4:33 PM normal sinus rhythm, rate 71, left axis, normal intervals, no STEMI, no acute changes, no ectopy, similar to prior EKG.     ED Course as of 05/15/24 1032   Tue May 14, 2024   1545 CT with acute sigmoid diverticulitis.  Patient feels markedly improved and feels comfortable with discharge.  Repeat vitals pending, likely plan for Augmentin outpatient course.  Cipro allergy. Has been eating a large number of strawberries, possible  culprit.  [JM]      ED Course User Index  [JM] Zander Apple MD         Diagnoses as of 05/15/24 1032   Diverticulitis         Chronic medical conditions affecting care listed in MDM. I independently reviewed imaging studies and agreed with radiology reads. I reviewed recent and relevant outside records including PCP notes, Prior discharge summaries, and prior radiology reports.    Procedure  Procedures    Diagnosis:   1.  Diverticulitis    Dispo: Discharged in stable condition      Disclaimer: Portions of this note were dictated by speech recognition. An attempt at proof reading was made to minimize errors. Minor errors in transcription may be present.  Please call if questions.     Zander Apple MD  05/15/24 1032

## 2024-05-15 LAB — HOLD SPECIMEN: NORMAL

## 2024-05-16 ENCOUNTER — HOSPITAL ENCOUNTER (EMERGENCY)
Facility: HOSPITAL | Age: 64
Discharge: HOME | End: 2024-05-16
Attending: EMERGENCY MEDICINE
Payer: MEDICARE

## 2024-05-16 ENCOUNTER — APPOINTMENT (OUTPATIENT)
Dept: RADIOLOGY | Facility: HOSPITAL | Age: 64
End: 2024-05-16
Payer: MEDICARE

## 2024-05-16 ENCOUNTER — OFFICE VISIT (OUTPATIENT)
Dept: OTOLARYNGOLOGY | Facility: CLINIC | Age: 64
End: 2024-05-16
Payer: COMMERCIAL

## 2024-05-16 ENCOUNTER — CLINICAL SUPPORT (OUTPATIENT)
Dept: AUDIOLOGY | Facility: CLINIC | Age: 64
End: 2024-05-16
Payer: COMMERCIAL

## 2024-05-16 VITALS — WEIGHT: 238 LBS | BODY MASS INDEX: 39.65 KG/M2 | HEIGHT: 65 IN

## 2024-05-16 VITALS
DIASTOLIC BLOOD PRESSURE: 75 MMHG | BODY MASS INDEX: 38.57 KG/M2 | SYSTOLIC BLOOD PRESSURE: 141 MMHG | OXYGEN SATURATION: 96 % | HEIGHT: 66 IN | HEART RATE: 77 BPM | RESPIRATION RATE: 18 BRPM | WEIGHT: 240 LBS | TEMPERATURE: 97 F

## 2024-05-16 DIAGNOSIS — R42 DIZZINESS: Primary | ICD-10-CM

## 2024-05-16 DIAGNOSIS — G47.33 OBSTRUCTIVE SLEEP APNEA: ICD-10-CM

## 2024-05-16 DIAGNOSIS — R10.9 ABDOMINAL PAIN, UNSPECIFIED ABDOMINAL LOCATION: Primary | ICD-10-CM

## 2024-05-16 DIAGNOSIS — H90.42 SENSORINEURAL HEARING LOSS (SNHL) OF LEFT EAR WITH UNRESTRICTED HEARING OF RIGHT EAR: ICD-10-CM

## 2024-05-16 LAB
ALBUMIN SERPL BCP-MCNC: 3.8 G/DL (ref 3.4–5)
ALP SERPL-CCNC: 80 U/L (ref 33–136)
ALT SERPL W P-5'-P-CCNC: 10 U/L (ref 7–45)
ANION GAP SERPL CALC-SCNC: 12 MMOL/L (ref 10–20)
APPEARANCE UR: CLEAR
AST SERPL W P-5'-P-CCNC: 13 U/L (ref 9–39)
ATRIAL RATE: 89 BPM
BASOPHILS # BLD AUTO: 0 X10*3/UL (ref 0–0.1)
BASOPHILS NFR BLD AUTO: 0 %
BILIRUB SERPL-MCNC: 0.8 MG/DL (ref 0–1.2)
BILIRUB UR STRIP.AUTO-MCNC: NEGATIVE MG/DL
BUN SERPL-MCNC: 25 MG/DL (ref 6–23)
CALCIUM SERPL-MCNC: 9 MG/DL (ref 8.6–10.3)
CHLORIDE SERPL-SCNC: 108 MMOL/L (ref 98–107)
CO2 SERPL-SCNC: 24 MMOL/L (ref 21–32)
COLOR UR: YELLOW
CREAT SERPL-MCNC: 1.39 MG/DL (ref 0.5–1.05)
EGFRCR SERPLBLD CKD-EPI 2021: 42 ML/MIN/1.73M*2
EOSINOPHIL # BLD AUTO: 0.22 X10*3/UL (ref 0–0.7)
EOSINOPHIL NFR BLD AUTO: 2.5 %
ERYTHROCYTE [DISTWIDTH] IN BLOOD BY AUTOMATED COUNT: 14.3 % (ref 11.5–14.5)
GLUCOSE SERPL-MCNC: 105 MG/DL (ref 74–99)
GLUCOSE UR STRIP.AUTO-MCNC: NORMAL MG/DL
HCT VFR BLD AUTO: 41.9 % (ref 36–46)
HGB BLD-MCNC: 13.5 G/DL (ref 12–16)
IMM GRANULOCYTES # BLD AUTO: 0.02 X10*3/UL (ref 0–0.7)
IMM GRANULOCYTES NFR BLD AUTO: 0.2 % (ref 0–0.9)
KETONES UR STRIP.AUTO-MCNC: ABNORMAL MG/DL
LACTATE SERPL-SCNC: 1.2 MMOL/L (ref 0.4–2)
LEUKOCYTE ESTERASE UR QL STRIP.AUTO: ABNORMAL
LIPASE SERPL-CCNC: 23 U/L (ref 9–82)
LYMPHOCYTES # BLD AUTO: 1.76 X10*3/UL (ref 1.2–4.8)
LYMPHOCYTES NFR BLD AUTO: 19.9 %
MCH RBC QN AUTO: 28.8 PG (ref 26–34)
MCHC RBC AUTO-ENTMCNC: 32.2 G/DL (ref 32–36)
MCV RBC AUTO: 89 FL (ref 80–100)
MONOCYTES # BLD AUTO: 0.73 X10*3/UL (ref 0.1–1)
MONOCYTES NFR BLD AUTO: 8.2 %
MUCOUS THREADS #/AREA URNS AUTO: ABNORMAL /LPF
NEUTROPHILS # BLD AUTO: 6.12 X10*3/UL (ref 1.2–7.7)
NEUTROPHILS NFR BLD AUTO: 69.2 %
NITRITE UR QL STRIP.AUTO: NEGATIVE
NRBC BLD-RTO: 0 /100 WBCS (ref 0–0)
P AXIS: 42 DEGREES
P OFFSET: 169 MS
P ONSET: 131 MS
PH UR STRIP.AUTO: 5.5 [PH]
PLATELET # BLD AUTO: 289 X10*3/UL (ref 150–450)
POTASSIUM SERPL-SCNC: 4.3 MMOL/L (ref 3.5–5.3)
PR INTERVAL: 172 MS
PROT SERPL-MCNC: 6.3 G/DL (ref 6.4–8.2)
PROT UR STRIP.AUTO-MCNC: ABNORMAL MG/DL
Q ONSET: 217 MS
QRS COUNT: 15 BEATS
QRS DURATION: 68 MS
QT INTERVAL: 372 MS
QTC CALCULATION(BAZETT): 452 MS
QTC FREDERICIA: 424 MS
R AXIS: -57 DEGREES
RBC # BLD AUTO: 4.69 X10*6/UL (ref 4–5.2)
RBC # UR STRIP.AUTO: ABNORMAL /UL
RBC #/AREA URNS AUTO: ABNORMAL /HPF
SODIUM SERPL-SCNC: 140 MMOL/L (ref 136–145)
SP GR UR STRIP.AUTO: 1.03
SQUAMOUS #/AREA URNS AUTO: ABNORMAL /HPF
T AXIS: 33 DEGREES
T OFFSET: 403 MS
UROBILINOGEN UR STRIP.AUTO-MCNC: ABNORMAL MG/DL
VENTRICULAR RATE: 89 BPM
WBC # BLD AUTO: 8.9 X10*3/UL (ref 4.4–11.3)
WBC #/AREA URNS AUTO: ABNORMAL /HPF
WBC CASTS #/AREA UR COMP ASSIST: ABNORMAL /LPF

## 2024-05-16 PROCEDURE — 76857 US EXAM PELVIC LIMITED: CPT | Performed by: RADIOLOGY

## 2024-05-16 PROCEDURE — 83690 ASSAY OF LIPASE: CPT

## 2024-05-16 PROCEDURE — 99204 OFFICE O/P NEW MOD 45 MIN: CPT | Performed by: OTOLARYNGOLOGY

## 2024-05-16 PROCEDURE — 3044F HG A1C LEVEL LT 7.0%: CPT | Performed by: AUDIOLOGIST

## 2024-05-16 PROCEDURE — 83605 ASSAY OF LACTIC ACID: CPT

## 2024-05-16 PROCEDURE — 1159F MED LIST DOCD IN RCRD: CPT | Performed by: AUDIOLOGIST

## 2024-05-16 PROCEDURE — 1159F MED LIST DOCD IN RCRD: CPT | Performed by: OTOLARYNGOLOGY

## 2024-05-16 PROCEDURE — 99284 EMERGENCY DEPT VISIT MOD MDM: CPT | Mod: 25 | Performed by: EMERGENCY MEDICINE

## 2024-05-16 PROCEDURE — 1036F TOBACCO NON-USER: CPT | Performed by: AUDIOLOGIST

## 2024-05-16 PROCEDURE — 76830 TRANSVAGINAL US NON-OB: CPT | Performed by: RADIOLOGY

## 2024-05-16 PROCEDURE — 1036F TOBACCO NON-USER: CPT | Performed by: OTOLARYNGOLOGY

## 2024-05-16 PROCEDURE — 3044F HG A1C LEVEL LT 7.0%: CPT | Performed by: OTOLARYNGOLOGY

## 2024-05-16 PROCEDURE — 1160F RVW MEDS BY RX/DR IN RCRD: CPT | Performed by: OTOLARYNGOLOGY

## 2024-05-16 PROCEDURE — 85025 COMPLETE CBC W/AUTO DIFF WBC: CPT

## 2024-05-16 PROCEDURE — 76856 US EXAM PELVIC COMPLETE: CPT

## 2024-05-16 PROCEDURE — 3050F LDL-C >= 130 MG/DL: CPT | Performed by: AUDIOLOGIST

## 2024-05-16 PROCEDURE — 81001 URINALYSIS AUTO W/SCOPE: CPT

## 2024-05-16 PROCEDURE — 80053 COMPREHEN METABOLIC PANEL: CPT

## 2024-05-16 PROCEDURE — 92557 COMPREHENSIVE HEARING TEST: CPT | Performed by: AUDIOLOGIST

## 2024-05-16 PROCEDURE — 1160F RVW MEDS BY RX/DR IN RCRD: CPT | Performed by: AUDIOLOGIST

## 2024-05-16 PROCEDURE — 36415 COLL VENOUS BLD VENIPUNCTURE: CPT

## 2024-05-16 PROCEDURE — 3050F LDL-C >= 130 MG/DL: CPT | Performed by: OTOLARYNGOLOGY

## 2024-05-16 PROCEDURE — 92567 TYMPANOMETRY: CPT | Performed by: AUDIOLOGIST

## 2024-05-16 RX ORDER — KETOROLAC TROMETHAMINE 10 MG/1
10 TABLET, FILM COATED ORAL EVERY 6 HOURS PRN
Qty: 16 TABLET | Refills: 0 | Status: SHIPPED | OUTPATIENT
Start: 2024-05-16 | End: 2024-05-20

## 2024-05-16 ASSESSMENT — ENCOUNTER SYMPTOMS
FATIGUE: 1
HEADACHES: 1
EYE ITCHING: 1
DIZZINESS: 1
SHORTNESS OF BREATH: 1

## 2024-05-16 ASSESSMENT — PAIN SCALES - GENERAL: PAINLEVEL_OUTOF10: 8

## 2024-05-16 ASSESSMENT — PAIN - FUNCTIONAL ASSESSMENT: PAIN_FUNCTIONAL_ASSESSMENT: 0-10

## 2024-05-16 ASSESSMENT — PAIN DESCRIPTION - LOCATION: LOCATION: ABDOMEN

## 2024-05-16 ASSESSMENT — PAIN DESCRIPTION - ORIENTATION: ORIENTATION: RIGHT;LEFT;LOWER

## 2024-05-16 NOTE — PROGRESS NOTES
Subjective   Patient ID: Isabelle Abel is a 63 y.o. female  HPI  Patient is complaining of a chronic history of recurrent episodes of dizziness.  She describes dizziness as a spinning sensation that can last anywhere from a few seconds to a few minutes.  She has no otalgia and no otorrhea and there is no particular movement or position that will cause the vertigo.  She also has a chronic history of obstructive sleep apnea and complains of some fatigue and headaches.  She has been using CPAP.    Review of Systems   Constitutional:  Positive for fatigue.   HENT:  Positive for ear discharge.    Eyes:  Positive for itching.        Blurred vision, watery eyes   Respiratory:  Positive for shortness of breath.    Neurological:  Positive for dizziness and headaches.       Objective   Physical Exam  The following elements of a detailed head and neck exam were performed: General appearance, the skin of the head and neck, inspection of the external ears and ear canal the tympanic membranes, inspection of the mobility of the tympanic membranes, the appearance of the external nose, the nasal mucosa and septum and nasal turbinates, the lips, the teeth, the gums, the tongue, the oral mucosa and the palate, inspection of the tonsils and the posterior pharyngeal wall, indirect mirror laryngoscopy and inspection of the hypopharynx, palpation of the lymph nodes in the neck, and palpation of the thyroid, palpation of the salivary glands, cranial nerve exam including cranial nerves II, III, IV, V, VI, and VII, and cranial nerves IX, X, XI, and XII, and the subjective evaluation of the voice, the inspection of the neck for the presence of respiratory retractions, and the presence or absence of stridor.    There is elevation of the BMI of 39.6.  There is elevation of the base of tongue and she has a low palate arch.  There is no spontaneous nystagmus noted.  Finger-to-nose and past-pointing are also noted to be normal.  There is clinical  evidence of hearing loss noted during conversation the remainder of her exam was within normal limits.    Assessment/Plan   Diagnoses and all orders for this visit:  Dizziness (Primary)  -     Tympanometry Only; Future  -     Electronystagmography; Future  -     Comprehensive hearing test; Future  Obstructive sleep apnea     1.  Chronic recurrent episodes of vertigo and dizziness and imbalance of uncertain etiology and prognosis.  The patient was scheduled for an audiogram and VNG test and tympanogram to evaluate her vestibular system objectively.  2.  Chronic obstructive sleep apnea controlled with CPAP.

## 2024-05-16 NOTE — ED PROVIDER NOTES
HPI   Chief Complaint   Patient presents with    Abdominal Pain    Vaginal Bleeding       Chief complaint: Abdominal pain, vaginal bleeding    History of present illness: Patient is a 63-year-old female presenting to the emergency department with complaints of abdominal discomfort.  According to the patient, she was having abdominal pain recently was seen in the emergency department approximately 2 days ago and diagnosed with diverticulitis.  The patient states that since then, she has been continued to have abdominal discomfort.  Patient states that now, she noticed small amount of vaginal spotting.  The patient denies any fever with this.  The patient denies having symptoms like this in the past.  The patient has concerns as she is still having abdominal discomfort despite her taking antibiotics.  As result, the patient presents to the emergency department for further evaluation she has no other complaints at this time.      History provided by:  Patient   used: No                        [place] Coma Scale Score: 15                     Patient History   Past Medical History:   Diagnosis Date    Asthma ([facility])     Encounter for follow-up examination after completed treatment for conditions other than malignant neoplasm 05/20/2021    Hospital discharge follow-up    Encounter for gynecological examination (general) (routine) without abnormal findings 03/11/2019    Encounter for annual routine gynecological examination    Hypertension     Non-ST elevation (NSTEMI) myocardial infarction (Multi) 02/28/2022    Non-ST elevation myocardial infarction (NSTEMI) in recovery phase    Personal history of other diseases of the respiratory system 05/19/2018    History of chronic obstructive lung disease    Zoster without complications 10/14/2020    Herpes zoster without complication     Past Surgical History:   Procedure Laterality Date    OTHER SURGICAL HISTORY  04/10/2014    Uterine Surgery    OTHER  SURGICAL HISTORY  2019    Dilation and curettage    OTHER SURGICAL HISTORY  2019    Surgically induced     TOTAL HIP ARTHROPLASTY  2018    Hip Replacement     Family History   Problem Relation Name Age of Onset    Other (abdominal aneurysm) Mother      Heart attack Mother      Alzheimer's disease Father      Transient ischemic attack Sister      Breast cancer Sibling      Diabetes Maternal Cousin      Breast cancer Other mat relatives     Allergies Other       Social History     Tobacco Use    Smoking status: Former     Types: Cigarettes    Smokeless tobacco: Never   Substance Use Topics    Alcohol use: Not Currently    Drug use: Never       Physical Exam   ED Triage Vitals [24 1441]   Temperature Heart Rate Respirations BP   36.1 °C (97 °F) 99 15 124/69      Pulse Ox Temp src Heart Rate Source Patient Position   96 % -- -- --      BP Location FiO2 (%)     -- --       Physical Exam  Constitutional:       Appearance: Normal appearance.   HENT:      Head: Normocephalic and atraumatic.      Right Ear: External ear normal.      Left Ear: External ear normal.      Nose: Nose normal.      Mouth/Throat:      Mouth: Mucous membranes are moist.   Eyes:      General: Lids are normal.      Extraocular Movements: Extraocular movements intact.      Pupils: Pupils are equal, round, and reactive to light.   Cardiovascular:      Rate and Rhythm: Normal rate and regular rhythm.      Heart sounds: Normal heart sounds.   Pulmonary:      Effort: Pulmonary effort is normal.      Breath sounds: Normal breath sounds.   Abdominal:      General: Abdomen is flat.      Palpations: Abdomen is soft.      Tenderness: There is abdominal tenderness. There is rebound. There is no guarding.   Musculoskeletal:         General: No deformity. Normal range of motion.      Cervical back: Normal range of motion and neck supple.   Skin:     General: Skin is warm.      Capillary Refill: Capillary refill takes less than 2  seconds.      Coloration: Skin is not jaundiced.   Neurological:      General: No focal deficit present.      Mental Status: She is alert and oriented to person, place, and time.   Psychiatric:         Mood and Affect: Mood normal.         Behavior: Behavior normal.         ED Course & MDM   Diagnoses as of 05/18/24 1144   Abdominal pain, unspecified abdominal location       Medical Decision Making  Medical decision making: Patient remained stable throughout her time in the emergency department.  CBC demonstrated no significant abnormalities Chem-7 and LFTs were essentially within normal limits urinalysis demonstrated no significant abnormalities lipase and lactate were both within normal limits ultrasound of the patient's pelvis demonstrated Multi fibroid uterus but ovaries not visualized.    Patient presents to the emergency department with complaints of vaginal bleeding abdominal discomfort.  I reviewed the patient's chart and see that the patient was in fact recently diagnosed with a diverticulitis.  I explained to the patient that she likely needs just several more days of antibiotics until she has clinical benefit from this.  The patient does have some chronic lower abdominal pain secondary to her fibroids.  The patient was referred to OB/GYN for further evaluation of therapy of this issue as indicated.  The patient expressed understanding and agreement.  The patient was given a prescription for Toradol for pain control and the patient was then discharged home in otherwise stable condition.    Amount and/or Complexity of Data Reviewed  Labs: ordered. Decision-making details documented in ED Course.  Radiology: ordered. Decision-making details documented in ED Course.        Procedure  Procedures     Guerrero Lee MD  05/18/24 0118

## 2024-05-16 NOTE — PROGRESS NOTES
AUDIOLOGIC EVALUATION    Name:  Isabelle Abel  :  1960  Age:  63 y.o.    HISTORY:     Patient reported a long history of intermittent episodes of spinning sensations.  Episodes have become more frequent within the last year or so.  She describes a sensation of spinning while objects around her remained stationary.  She did note that on occasion loud sounds, environments, and music will trigger episodes.  She also has intermittent lightheaded and balance concerns.  She denied pain, pressure, tinnitus, and concerns regarding hearing loss.  Within the last couple of months she did experience an upper respiratory infection that led to drainage in her ear canal, but that has since resolved.    EVALUATION:    See Audiogram.    RESULTS:    Otoscopy:  Right: Clear canal, normal color and appearance of tympanic membrane.  Left: Clear canal, normal color and appearance of tympanic membrane.    Tympanometry:  Right: Normal tympanic membrane mobility and middle ear pressure.  Left: Normal tympanic membrane mobility and middle ear pressure.    Hearing Sensitivity:  Right: Hearing within normal limits.  Left: Hearing essentially within normal limits.  **Note  asymmetry with left worse compared to 500-1000 Hz.    Word Recognition Score (NU-6 ordered 1-2):  Right: Excellent (100%) at 50 dB.  Left: Excellent (100%) at 50 dB.        ASSESSMENT AND PLAN:    - Continue medical follow-up with Dr. Armando.  - Continue further evaluation of dizziness as recommended by Dr. Armando.  Consider further evaluation of asymmetry despite normal hearing sensitivity.  - Monitor and recheck hearing as warranted.  - Counseled regarding results and recommendations.      Reba Hassan  Clinical Audiologist

## 2024-05-16 NOTE — Clinical Note
Isabelle Abel was seen and treated in our emergency department on 5/16/2024.  She may return to work on 05/20/2024.       If you have any questions or concerns, please don't hesitate to call.      Guerrero Lee MD

## 2024-05-16 NOTE — ED TRIAGE NOTES
TRIAGE NOTE   I saw the patient as the Clinician in Triage and performed a brief history and physical exam, established acuity, and ordered appropriate tests to develop basic plan of care. Patient will be seen by an [facility], resident and/or physician who will independently evaluate the patient. Please see subsequent provider notes for further details and disposition.     Brief HPI: In brief, Isabelle Abel is a 63 y.o. female that presents for vaginal bleeding.  Patient states that she was here couple days ago diagnosed with diverticulitis is been taking antibiotics.  She states that today she noticed little bit of vaginal spotting.  She states she went to the bathroom again and did not notice any vaginal bleeding.  She denies any fever/chills, nausea/vomiting.  Denies diarrhea or hematochezia.  Denies chest pain or shortness of breath.  Has no other symptoms or concerns at this time.    Focused Physical exam:   Abdomen soft nontender nondistended.  No rebound tenderness or guarding.  No palpable masses.  No pulsatile masses.  Plan/MDM:   Initiating CBC, CMP, lactate, lipase, ultrasound pelvis.  Patient will be seen in the back to the ED for further evaluation and treatment.  I will not be following this patient during her ED visit.  This is just a preliminary evaluation during triage.    Please see subsequent provider note for further details and disposition.  As provider-in-triage, I performed a medical screening history and physical exam on this patient. For the remainder of the patient's workup and ED course, please see the main ED provider note.  I evaluated this patient in triage with the RN. Due to the patient's complaint, labs, imaging, and/or interventions were ordered by me in an attempt to expedite/facilitate patient care, however I am not participating in care after evaluation. This is a preliminary assessment. Patient does not appear in acute distress at this time. They are stable and will have a full  evaluation as soon as possible. They will be cared for by another provider who will possibly order more labs, imaging and/or interventions. Patient did not have a full ROS or PE completed by myself, however below is a summary with reasons for orders.  Patient to be reevaluated once in formal ED bed.

## 2024-05-16 NOTE — ED TRIAGE NOTES
Patient ambulatory to ED with complaint of lower abdominal pain with vaginal spotting after having a bowel movement this morning. Denies rectal bleeding/N/V/urinary symptoms. Patient states she was seen here 5/14 and dx with diverticulitis.

## 2024-05-17 ENCOUNTER — APPOINTMENT (OUTPATIENT)
Dept: OBSTETRICS AND GYNECOLOGY | Facility: CLINIC | Age: 64
End: 2024-05-17
Payer: COMMERCIAL

## 2024-05-20 ENCOUNTER — OFFICE VISIT (OUTPATIENT)
Dept: OBSTETRICS AND GYNECOLOGY | Facility: CLINIC | Age: 64
End: 2024-05-20
Payer: MEDICARE

## 2024-05-20 VITALS
WEIGHT: 232 LBS | DIASTOLIC BLOOD PRESSURE: 67 MMHG | BODY MASS INDEX: 38.65 KG/M2 | HEIGHT: 65 IN | SYSTOLIC BLOOD PRESSURE: 154 MMHG

## 2024-05-20 DIAGNOSIS — M62.89 PFD (PELVIC FLOOR DYSFUNCTION): Primary | ICD-10-CM

## 2024-05-20 DIAGNOSIS — R10.2 PELVIC PAIN IN FEMALE: ICD-10-CM

## 2024-05-20 LAB
POC BLOOD, URINE: ABNORMAL
POC GLUCOSE, URINE: NEGATIVE MG/DL
POC KETONES, URINE: NEGATIVE MG/DL
POC LEUKOCYTES, URINE: NEGATIVE
POC NITRITE,URINE: NEGATIVE
POC PROTEIN, URINE: NEGATIVE MG/DL

## 2024-05-20 PROCEDURE — 81003 URINALYSIS AUTO W/O SCOPE: CPT | Mod: QW | Performed by: OBSTETRICS & GYNECOLOGY

## 2024-05-20 PROCEDURE — 1036F TOBACCO NON-USER: CPT | Performed by: OBSTETRICS & GYNECOLOGY

## 2024-05-20 PROCEDURE — 3078F DIAST BP <80 MM HG: CPT | Performed by: OBSTETRICS & GYNECOLOGY

## 2024-05-20 PROCEDURE — 1160F RVW MEDS BY RX/DR IN RCRD: CPT | Performed by: OBSTETRICS & GYNECOLOGY

## 2024-05-20 PROCEDURE — 3044F HG A1C LEVEL LT 7.0%: CPT | Performed by: OBSTETRICS & GYNECOLOGY

## 2024-05-20 PROCEDURE — 99214 OFFICE O/P EST MOD 30 MIN: CPT | Performed by: OBSTETRICS & GYNECOLOGY

## 2024-05-20 PROCEDURE — 3077F SYST BP >= 140 MM HG: CPT | Performed by: OBSTETRICS & GYNECOLOGY

## 2024-05-20 PROCEDURE — 3050F LDL-C >= 130 MG/DL: CPT | Performed by: OBSTETRICS & GYNECOLOGY

## 2024-05-20 PROCEDURE — 1125F AMNT PAIN NOTED PAIN PRSNT: CPT | Performed by: OBSTETRICS & GYNECOLOGY

## 2024-05-20 PROCEDURE — 1159F MED LIST DOCD IN RCRD: CPT | Performed by: OBSTETRICS & GYNECOLOGY

## 2024-05-20 ASSESSMENT — ENCOUNTER SYMPTOMS
CONSTITUTIONAL NEGATIVE: 0
LOSS OF SENSATION IN FEET: 0
PSYCHIATRIC NEGATIVE: 0
EYES NEGATIVE: 0
ABDOMINAL PAIN: 1
HEMATOLOGIC/LYMPHATIC NEGATIVE: 0
BACK PAIN: 1
OCCASIONAL FEELINGS OF UNSTEADINESS: 0
NEUROLOGICAL NEGATIVE: 0
GASTROINTESTINAL NEGATIVE: 0
CONSTIPATION: 1
MUSCULOSKELETAL NEGATIVE: 0
ENDOCRINE NEGATIVE: 0
ALLERGIC/IMMUNOLOGIC NEGATIVE: 0
FATIGUE: 1
DEPRESSION: 0
RESPIRATORY NEGATIVE: 0
CARDIOVASCULAR NEGATIVE: 0

## 2024-05-20 ASSESSMENT — LIFESTYLE VARIABLES
SKIP TO QUESTIONS 9-10: 1
HOW OFTEN DO YOU HAVE SIX OR MORE DRINKS ON ONE OCCASION: NEVER
AUDIT-C TOTAL SCORE: 0
HOW MANY STANDARD DRINKS CONTAINING ALCOHOL DO YOU HAVE ON A TYPICAL DAY: PATIENT DOES NOT DRINK
HOW OFTEN DO YOU HAVE A DRINK CONTAINING ALCOHOL: NEVER

## 2024-05-20 ASSESSMENT — PAIN SCALES - GENERAL: PAINLEVEL: 5

## 2024-05-20 NOTE — PROGRESS NOTES
Subjective   Patient ID: Isabelle Abel is a 63 y.o. female who presents for New Patient Visit (New patient visit/Ed follow up/pelvic pain last pap 21 wnl HPV negative last mammogram 6/3/23 benign).  HPI  Isabelle is a 63-year-old female  2 para 0 with a previous miscarriage and termination in the 70s.  Has been seen twice in the emergency room for lower abdominal pain and cramping.  States that this has been going on for a year intermittently but now seems to be coming more frequently.  She denies any urinary symptoms.  She does have a known history of fibroids.  A previous diagnosis of diverticulosis was then considered to be diverticulitis and she was treated with antibiotics.  She does seem to be improved now that she is the end of her course of antibiotics but it took quite a while and she returned to the emergency room where she received an ultrasound due to some bloody discharge on the tissue.  Ultrasound revealed fibroids and thin endometrial lining  Review of Systems   Constitutional:  Positive for fatigue.   Gastrointestinal:  Positive for abdominal pain and constipation.   Genitourinary:  Positive for vaginal bleeding and vaginal pain.   Musculoskeletal:  Positive for back pain.   All other systems reviewed and are negative.      Objective   Physical Exam  Abdomen is soft nondistended bowel sounds positive no masses palpated.  Tender to deep palpation left greater than right    Pelvic exam: External genitalia [place]tholin's urethra and [place]'s normal.  Vaginal vault without blood or discharge.  On bimanual exam uterus is bulky nontender no adnexal masses.  Mild bladder pain.  Significant pelvic floor pain both left and right.    Ultrasound reviewed personally.  Endometrial lining thin but a slim layer of fluid noted within the lining  Assessment/Plan   Pelvic and lower abdominal pain consistent with pelvic floor dysfunction.    She also has diverticulosis and diverticulitis confusing the  picture.    1 episode of bloody discharge.  Will continue to observe.  She is aware that any further bleeding would necessitate some endometrial sampling especially in view of a slight amount of fluid in her endometrial lining.    Referred to pelvic floor therapy for pelvic floor dysfunction.             Garo Quintero MD 05/20/24 2:51 PM

## 2024-05-22 NOTE — PROGRESS NOTES
ADULT BALANCE FUNCTION TEST (BFT)    ***PT ntes abnormal R eye convergence and abnormal MTCSIB (visual vestib pattern)  Name:  Isabelle Abel  :  1960  Age:  63 y.o.  Date of Evaluation:  ***    IMPRESSIONS     Overall normal vestibular examination; no evidence of active vestibular system involvement to account for patient reported symptoms. There were no indications of peripheral or central vestibular system pathway involvement. There were no indications of active Benign Paroxysmal Positional Vertigo (BPPV) present. Normal observation of gait and transfers. Bedside postural control findings demonstrate normal functional balance with varying sensory information measured on the mCTSIB. Patient's risk of falling based on today's evaluation is low.    RECOMMENDATIONS     Consider re-evaluation as medically indicated.  Maintain a healthy lifestyle to help body function overall.  Continue monitoring per ENT/PCP preference.    Time: ***    HISTORY     Patient was seen for Balance Function Testing (BFT) due to a history of dizziness/imbalance. Vestibular case history collected via patient-clinician interview, patient chart review, and patient questionnaires.    Patient reported history of dizziness described as vertigo/spinning.  Symptoms began ***.  Episodes occur *** and last several seconds to minutes before subsiding.  Most recent episode occurred ***.  Associated symptoms include fatigue, headaches, ***.  Symptoms are provoked by loud sounds/music/environmental noise, ***.  Symptoms are alleviated by ***.  Overall the patient's symptoms have increased in frequency over time. Of note, patient has participated in vestibular physical therapy with focus on ***. Patient did *** perceive improvement in symptoms following treatment.   This patient has had a total of *** falls due to their symptoms.   Denied any symptoms provoked by sneezing or coughing, as well as any presence of autophony.   Denied any recent  "medication changes.   Relevant medical history includes ***.  Most recent audiologic evaluation performed on 05/16/2024 by Reba Mccracken CCC-AMIE revealed essentially normal hearing sensitivity, bilaterally. Tympanometry revealed normal eardrum mobility and canal volume, bilaterally.  Patient reported complying with pre-test instructions.     EVALUATION     See VNG, vHIT, & VEMP Raw Data in \"Media\"    TEST RESULTS     BEDSIDE ASSESSMENT TEST  The bedside assessment is an optional portion of the test battery to further assist in differential diagnosis and screen for eye abnormalities which may affect testing.    Otoscopy: {CERUMEN2:41085}  Tympanometry: {TYMPSC:25055::\"Tympanometry revealed normal ear canal volume, peak pressure, and compliance, consistent with normal middle ear function (Type A).\"} ***  Extra-Ocular Range of Motion: {ROM:05482::\"normal.\"}Extra-Ocular Range of Motion is performed to evaluate any eye abnormalities prior to testing.  Cover/Uncover: {COVER/UNCOVER:80787::\"normal.\"} Cover/Uncover test is performed to evaluate for skewed deviation of the eyes prior to testing.  Cervical Neck Exam: {NECKEXAM:07725::\"normal.\"} Cervical Neck is performed to evaluate any restrictions or pain with neck movement prior to testing.  Vertebral Artery Screen: {VAS:73727::\"normal.\"} Vertebral Artery Screen is performed to evaluate for vertebrobasilar insufficiency prior to testing.   Ocular Counter-Roll: {OCR:78835::\"normal.\"} Ocular Counter-Roll is performed to evaluate ocular tilt reaction and assess otolith organ function prior to testing.  VOR Cancellation: {VORC:40734::\"normal.\"} VOR Cancellation is performed to evaluate fixation suppression prior to testing.  Dynamic Visual Acuity: {DVA:93144::\"normal.\"} DVA is performed to evaluate the VOR and visual/vestibular interaction.***  Modified Clinical Test of Sensory Interaction on Balance (mCTSIB): {mCTSIB:02830::\"normal.\"} mCTSIB is performed to evaluate balance " "with varying sensory information.      VIDEONYSTAGMOGRAPHY (VNG) TEST  VNG provides objective indications of peripheral and central vestibulo-ocular pathway involvement. Ocular motor testing to visually guided targets is conducted using a dual channel video-recording technique for the recording of eye movement in the horizontal and vertical planes. Air caloric testing is performed at 48 degrees C and 24 degrees C.    Spontaneous Nystagmus test was {SPONTANEOUSNYSTAGMUS:88013::\"absent.\"} Spontaneous nystagmus testing may help with the identification of an acute or uncompensated peripheral vestibular lesion.   Gaze Nystagmus test was {GAZENYSTAGMUS:13820::\"normal.\"} Gaze nystagmus testing is to evaluate for nystagmus that is evoked by holding eye gaze in any particular direction. True gaze nystagmus is amplified when vision is denied.   Random Saccades test was {OCULOMOTORS:48917::\"normal.\"} Random saccade testing is to evaluate patient's ability to make fast random eye movements along a horizontal moving target.   Smooth Pursuit/Tracking test was {OCULOMOTORS:33129::\"normal.\"} Smooth pursuit/tracking testing is to evaluate the ability to move eyes with a single smoothly moving target.   Optokinetic nystagmus testing was {OCULOMOTORS:28334::\"normal.\"} This full-field OPK test is to evaluate the ability of central nervous system to stabilize vision during sustained head movement after the VOR system loses effectiveness.   [place]-Hallpike testing was {BPPV:46434::\"normal.\"} [place]pike testing is to provide a diagnosis of Benign Paroxysmal Positional Vertigo (BPPV) of the vertical semicircular canals on the side which is most affected.  Roll testing was {BPPV:96755::\"normal.\"} Roll testing is to provide a diagnosis of Benign Paroxysmal Positional Vertigo (BPPV) of the horizontal semicircular canals on the side which is most affected.  Positional testing was {POSITIONALNYSTAGMUS:17435::\"normal.\"} Positional testing is to " "evaluate patient's ability to hold a steady gaze while in different positions.  {CALORICS:61118} caloric testing was {CALORICS2:40669}. Unilateral weakness of ***% to the *** which is {NORMAL/ABNORMAL:47921} and a directional preponderance of ***% to the *** which is {NORMAL/ABNORMAL:80975}. Caloric testing is to evaluate for peripheral vestibular lesion.    NOTE: monothermal caloric testing is indicated when:  slow phase velocity of the nystagmus is 8 degrees/second or greater  less than 15% difference in the degree of nystagmus between the ears  spontaneous or positional nystagmus observed during testing is less than 5 degrees/second      VIDEO HEAD IMPULSE TEST (vHIT)  The vHIT procedure provides objective assessment of the high frequency vestibulo-ocular reflex (VOR) for each semicircular canal. Rapid, random horizontal and vertical thrusts are applied to the patient's head to provoke the VOR. The vHIT procedure includes two separate paradigms: Head Impulse Paradigm (HIMP) and Suppression Head Impulse Paradigm (SHIMP). SHIMP is an optional paradigm that is not appropriate to perform for every patient. However, it is appropriate to perform SHIMP when there is verified evidence of possible vestibulopathy in the traditional HIMP test.     Head Impulse Paradigm (HIMP)   Right Ear   Canal Gain Overt Saccades Covert Saccades   Lateral *** absent absent   Anterior *** absent absent   Posterior *** absent absent        Head Impulse Paradigm (HIMP)   Left Ear   Canal Gain Overt Saccades Covert Saccades   Lateral *** absent absent   Anterior *** absent absent   Posterior *** absent absent     {VHITGAIN:66000::\"Total gain for all canals tested was within normal limits \"} (<0.80 is abnormal for lateral, <0.70 is abnormal for vertical).  {HIMPSACCADES:76410::\"There was no evidence of overt or covert saccades throughout testing\"}      Suppression Head Impulse Paradigm (SHIMP)    Canal Gain Corrective Anti-Compensatory " "Saccades   Right Lateral *** present   Left Lateral *** present     {VHITGAIN:46770::\"Total gain for all canals tested was within normal limits \"} (<0.80 is abnormal for lateral).  {SHIMPSACCADES:20637::\"There was evidence of appropriate corrective anti-compensatory saccades throughout testing.\"}      CERVICAL VESTIBULAR EVOKED MYOGENIC POTENTIALS (cVEMP):  The cVEMP procedure is an evoked potential used to test the saccule and its afferent pathway. An asymmetry ratio is utilized to determine side of lesion. The cVEMP was recorded with the patient cervical extension to produce isolated contraction of the ipsilateral sternocleidomastoid (SCM) muscle. The cVEMP was recorded using a 500 Hz tone burst or 1000 Hz tone burst at a rate of 5.1.      Ear Presentation Level Amplitude P1 Latency N1 Latency  Amplitude Asymmetry Ratio   Right *** dB nHL *** µV *** ms *** ms ***%   Left *** dB nHL *** µV *** ms *** ms       {cVEMP:25827::\"Replicable cVEMP responses were within normal limits, bilaterally. \"}The amplitude asymmetry ratio of ***% was not significant (>33% = abnormal).     Superior Canal Dehiscence Screening (75 dB nHL): Negative bilaterally        OCULAR VESTIBULAR EVOKED MYOGENIC POTENTIALS (oVEMP)  The oVEMP procedure is an evoked potential used to test the utricle and its afferent pathway. An asymmetry ratio is utilized to determine side of lesion. This is a contralateral recording. The oVEMP was recorded with the patient seated upright with eyes tilted upward to produce isolated contraction of the contralateral inferior oblique muscle. The oVEMP were recorded using a 500 Hz, 2000 Hz, 4000 Hz tone burst at a rate of 5.1.       Ear Presentation Level Amplitude N1 Latency  P1 Latency  Amplitude Asymmetry Ratio   Right *** dB nHL *** µV *** ms *** ms ***%   Left *** dB nHL *** µV *** ms *** ms       {oVEMP:58651::\"Replicable oVEMP responses were within normal limits, bilaterally. \"} The amplitude asymmetry ratio of " ***% was not significant (>33% = abnormal).     Meniere's Disease Screening (2000 Hz): Negative bilaterally  Superior Canal Dehiscence Screening (4000 Hz): Negative bilaterally      Testing and interpretation of results completed by Reba Davila CCC-AMIE KAUR. It was my pleasure to evaluate this patient.     Additional Attendees: ***      Reba Davila, CCC-A CCELIAS  Senior Clinical Vestibular Audiologist

## 2024-05-23 ENCOUNTER — APPOINTMENT (OUTPATIENT)
Dept: OBSTETRICS AND GYNECOLOGY | Facility: CLINIC | Age: 64
End: 2024-05-23
Payer: COMMERCIAL

## 2024-05-23 ENCOUNTER — APPOINTMENT (OUTPATIENT)
Dept: PHYSICAL THERAPY | Facility: CLINIC | Age: 64
End: 2024-05-23
Payer: COMMERCIAL

## 2024-05-27 DIAGNOSIS — I21.4 NSTEMI, INITIAL EPISODE OF CARE (MULTI): Primary | ICD-10-CM

## 2024-05-28 RX ORDER — ASPIRIN 81 MG/1
81 TABLET ORAL DAILY
Qty: 90 TABLET | Refills: 0 | Status: SHIPPED | OUTPATIENT
Start: 2024-05-28

## 2024-05-29 ENCOUNTER — LAB (OUTPATIENT)
Dept: LAB | Facility: LAB | Age: 64
End: 2024-05-29
Payer: COMMERCIAL

## 2024-05-29 ENCOUNTER — OFFICE VISIT (OUTPATIENT)
Dept: GASTROENTEROLOGY | Facility: CLINIC | Age: 64
End: 2024-05-29
Payer: COMMERCIAL

## 2024-05-29 VITALS — HEIGHT: 65 IN | WEIGHT: 232 LBS | HEART RATE: 85 BPM | BODY MASS INDEX: 38.65 KG/M2 | OXYGEN SATURATION: 99 %

## 2024-05-29 DIAGNOSIS — K57.32 DIVERTICULITIS OF RECTOSIGMOID: ICD-10-CM

## 2024-05-29 DIAGNOSIS — K90.9 STEATORRHEA (HHS-HCC): ICD-10-CM

## 2024-05-29 DIAGNOSIS — K57.32 DIVERTICULITIS OF RECTOSIGMOID: Primary | ICD-10-CM

## 2024-05-29 DIAGNOSIS — R10.30 LOWER ABDOMINAL PAIN: ICD-10-CM

## 2024-05-29 LAB
ALBUMIN SERPL BCP-MCNC: 4.2 G/DL (ref 3.4–5)
ALP SERPL-CCNC: 88 U/L (ref 33–136)
ALT SERPL W P-5'-P-CCNC: 16 U/L (ref 7–45)
ANION GAP SERPL CALC-SCNC: 15 MMOL/L (ref 10–20)
APPEARANCE UR: CLEAR
AST SERPL W P-5'-P-CCNC: 18 U/L (ref 9–39)
BASOPHILS # BLD AUTO: 0.06 X10*3/UL (ref 0–0.1)
BASOPHILS NFR BLD AUTO: 0.6 %
BILIRUB SERPL-MCNC: 0.6 MG/DL (ref 0–1.2)
BILIRUB UR STRIP.AUTO-MCNC: NEGATIVE MG/DL
BUN SERPL-MCNC: 22 MG/DL (ref 6–23)
CALCIUM SERPL-MCNC: 9.5 MG/DL (ref 8.6–10.6)
CHLORIDE SERPL-SCNC: 106 MMOL/L (ref 98–107)
CO2 SERPL-SCNC: 26 MMOL/L (ref 21–32)
COLOR UR: ABNORMAL
CREAT SERPL-MCNC: 1.22 MG/DL (ref 0.5–1.05)
EGFRCR SERPLBLD CKD-EPI 2021: 49 ML/MIN/1.73M*2
EOSINOPHIL # BLD AUTO: 0.43 X10*3/UL (ref 0–0.7)
EOSINOPHIL NFR BLD AUTO: 4.3 %
ERYTHROCYTE [DISTWIDTH] IN BLOOD BY AUTOMATED COUNT: 14.6 % (ref 11.5–14.5)
GLUCOSE SERPL-MCNC: 74 MG/DL (ref 74–99)
GLUCOSE UR STRIP.AUTO-MCNC: NORMAL MG/DL
HCT VFR BLD AUTO: 44.3 % (ref 36–46)
HGB BLD-MCNC: 13.7 G/DL (ref 12–16)
IMM GRANULOCYTES # BLD AUTO: 0.04 X10*3/UL (ref 0–0.7)
IMM GRANULOCYTES NFR BLD AUTO: 0.4 % (ref 0–0.9)
KETONES UR STRIP.AUTO-MCNC: NEGATIVE MG/DL
LEUKOCYTE ESTERASE UR QL STRIP.AUTO: ABNORMAL
LYMPHOCYTES # BLD AUTO: 3.29 X10*3/UL (ref 1.2–4.8)
LYMPHOCYTES NFR BLD AUTO: 32.6 %
MCH RBC QN AUTO: 28.5 PG (ref 26–34)
MCHC RBC AUTO-ENTMCNC: 30.9 G/DL (ref 32–36)
MCV RBC AUTO: 92 FL (ref 80–100)
MONOCYTES # BLD AUTO: 0.8 X10*3/UL (ref 0.1–1)
MONOCYTES NFR BLD AUTO: 7.9 %
MUCOUS THREADS #/AREA URNS AUTO: NORMAL /LPF
NEUTROPHILS # BLD AUTO: 5.48 X10*3/UL (ref 1.2–7.7)
NEUTROPHILS NFR BLD AUTO: 54.2 %
NITRITE UR QL STRIP.AUTO: NEGATIVE
NRBC BLD-RTO: 0 /100 WBCS (ref 0–0)
PH UR STRIP.AUTO: 5.5 [PH]
PLATELET # BLD AUTO: 356 X10*3/UL (ref 150–450)
POTASSIUM SERPL-SCNC: 4.6 MMOL/L (ref 3.5–5.3)
PROT SERPL-MCNC: 7 G/DL (ref 6.4–8.2)
PROT UR STRIP.AUTO-MCNC: NEGATIVE MG/DL
RBC # BLD AUTO: 4.8 X10*6/UL (ref 4–5.2)
RBC # UR STRIP.AUTO: ABNORMAL /UL
RBC #/AREA URNS AUTO: NORMAL /HPF
SODIUM SERPL-SCNC: 142 MMOL/L (ref 136–145)
SP GR UR STRIP.AUTO: 1.02
SQUAMOUS #/AREA URNS AUTO: NORMAL /HPF
UROBILINOGEN UR STRIP.AUTO-MCNC: NORMAL MG/DL
WBC # BLD AUTO: 10.1 X10*3/UL (ref 4.4–11.3)
WBC #/AREA URNS AUTO: NORMAL /HPF

## 2024-05-29 PROCEDURE — 80053 COMPREHEN METABOLIC PANEL: CPT

## 2024-05-29 PROCEDURE — 3050F LDL-C >= 130 MG/DL: CPT | Performed by: INTERNAL MEDICINE

## 2024-05-29 PROCEDURE — 87086 URINE CULTURE/COLONY COUNT: CPT

## 2024-05-29 PROCEDURE — 36415 COLL VENOUS BLD VENIPUNCTURE: CPT

## 2024-05-29 PROCEDURE — 81001 URINALYSIS AUTO W/SCOPE: CPT

## 2024-05-29 PROCEDURE — 85025 COMPLETE CBC W/AUTO DIFF WBC: CPT

## 2024-05-29 PROCEDURE — 1159F MED LIST DOCD IN RCRD: CPT | Performed by: INTERNAL MEDICINE

## 2024-05-29 PROCEDURE — 1160F RVW MEDS BY RX/DR IN RCRD: CPT | Performed by: INTERNAL MEDICINE

## 2024-05-29 PROCEDURE — 1036F TOBACCO NON-USER: CPT | Performed by: INTERNAL MEDICINE

## 2024-05-29 PROCEDURE — 99214 OFFICE O/P EST MOD 30 MIN: CPT | Performed by: INTERNAL MEDICINE

## 2024-05-29 PROCEDURE — 3044F HG A1C LEVEL LT 7.0%: CPT | Performed by: INTERNAL MEDICINE

## 2024-05-29 RX ORDER — METRONIDAZOLE 500 MG/1
500 TABLET ORAL 3 TIMES DAILY
Qty: 30 TABLET | Refills: 0 | Status: SHIPPED | OUTPATIENT
Start: 2024-05-29 | End: 2024-06-08

## 2024-05-29 RX ORDER — SULFAMETHOXAZOLE AND TRIMETHOPRIM 800; 160 MG/1; MG/1
1 TABLET ORAL 2 TIMES DAILY
Qty: 20 TABLET | Refills: 0 | Status: SHIPPED | OUTPATIENT
Start: 2024-05-29 | End: 2024-06-08

## 2024-05-29 NOTE — PATIENT INSTRUCTIONS
Blood tests:  /diff, CMP  Stool ova and parasites  Bactrim and Flagyl for 10 days  Soft diet  MOM 2 tbspn daily  If not resolving likely repeat CT and consider colonoscopy.

## 2024-05-29 NOTE — PROGRESS NOTES
"Subjective     History of Present Illness:   Isabelle Abel is a 63 y.o. female who presents to GI clinic for abdominal pain off and on for about 3 weeks. Bilateral lower abdominal pain. No relief with antibiotics from ED.  \"Feels like menstrual cramps.\"  BM: \"heavy\" (\"plops down a lot\".)  Hurts more right before needs to have a BM, but not every time.   Tried using MOM and did have more stool but didn't feel better after.  \"Excruciating.\"    Review of Systems  Review of Systems    Social History   reports that she has quit smoking. Her smoking use included cigarettes. She has never used smokeless tobacco. She reports that she does not currently use alcohol. She reports that she does not use drugs.     Allergies  Allergies   Allergen Reactions    Ciprofloxacin Hcl Itching       Medications  Current Outpatient Medications   Medication Instructions    albuterol 90 mcg/actuation inhaler 1-2 puffs, inhalation, Every 4 hours PRN    allopurinol (ZYLOPRIM) 300 mg, oral, Daily    amLODIPine (NORVASC) 2.5 mg, oral, Daily    aspirin 81 mg, oral, Daily    atorvastatin (LIPITOR) 80 mg, oral, Daily    azelastine (Astelin) 137 mcg (0.1 %) nasal spray 1 spray, Each Nostril, 2 times daily    blood-glucose meter (OneTouch Ultra2 Meter) misc Use to check glucose levels twice a day    chlorthalidone (HYGROTON) 25 mg, oral, Daily    clotrimazole-betamethasone (Lotrisone) cream 1 Application, Topical, 2 times daily    dorzolamide (Trusopt) 2 % ophthalmic solution No dose, route, or frequency recorded.    fluticasone (Flonase) 50 mcg/actuation nasal spray 1 spray, Does not apply, Daily RT    fluticasone-umeclidin-vilanter (Trelegy Ellipta) 200-62.5-25 mcg blister with device 1 puff, inhalation, Daily    ipratropium-albuteroL (Duo-Neb) 0.5-2.5 mg/3 mL nebulizer solution 3 mL, nebulization, Every 6 hours PRN    lancets (OneTouch Delica Plus Lancet) 30 gauge misc 2 Lancets, miscellaneous, Daily    meclizine (ANTIVERT) 25 mg, oral, 3 times " daily PRN    metFORMIN (GLUCOPHAGE) 500 mg, oral, Daily with breakfast    methocarbamol (ROBAXIN) 500 mg, oral, 2 times daily PRN, May increase to two tablets at a time if one tablet is not effective.    nitroglycerin (NITROSTAT) 0.4 mg, sublingual, Every 5 min PRN, FOR UP TO 3 DOSES AS NEEDED FOR CHEST PAIN.CALL 911 IF PAIN PERSISTS.    OneTouch Ultra Test strip 2 strips, miscellaneous, Daily    triamcinolone (Kenalog) 0.1 % cream Topical, 2 times daily, APPLY AND RUB IN A THIN FILM TO AFFECTED AREAS TWICE DAILY.(AM AND PM).<BR>        Objective   Visit Vitals  Pulse 85      Physical Exam  Constitutional:       Appearance: Normal appearance.   HENT:      Mouth/Throat:      Mouth: Mucous membranes are moist.      Pharynx: Oropharynx is clear.   Eyes:      Extraocular Movements: Extraocular movements intact.      Pupils: Pupils are equal, round, and reactive to light.   Cardiovascular:      Rate and Rhythm: Normal rate and regular rhythm.      Heart sounds: No murmur heard.  Pulmonary:      Effort: Pulmonary effort is normal.      Breath sounds: Normal breath sounds.   Abdominal:      General: Abdomen is flat. Bowel sounds are normal.      Palpations: Abdomen is soft. There is no mass.      Tenderness: There is abdominal tenderness (bilateral lower mild to moderate).   Skin:     General: Skin is warm and dry.   Neurological:      Mental Status: She is alert.   Psychiatric:         Mood and Affect: Mood normal.         Behavior: Behavior normal.         Thought Content: Thought content normal.         Judgment: Judgment normal.                       Assessment/Plan   Isabelle Abel is a 63 y.o. female who presents to GI clinic for off and on severe lower abd pain. CT ? Diverticulitis. ? Other infectious. ? Stool retention. No response to Augmentin and allergic to cipro (hives.)    Plan:  CBC/diff, CMP  Stool ova and para  Bactrim and Flagyl for 10 days  Soft diet  MOM 2 tbspn daily  If not resolving likely repeat CT  and consider colonoscopy.      Zach Almonte MD

## 2024-05-30 ENCOUNTER — OFFICE VISIT (OUTPATIENT)
Dept: PULMONOLOGY | Facility: CLINIC | Age: 64
End: 2024-05-30
Payer: COMMERCIAL

## 2024-05-30 VITALS
HEIGHT: 65 IN | WEIGHT: 230 LBS | RESPIRATION RATE: 18 BRPM | TEMPERATURE: 98.2 F | SYSTOLIC BLOOD PRESSURE: 128 MMHG | DIASTOLIC BLOOD PRESSURE: 78 MMHG | BODY MASS INDEX: 38.32 KG/M2 | OXYGEN SATURATION: 98 %

## 2024-05-30 DIAGNOSIS — F17.211 CIGARETTE NICOTINE DEPENDENCE IN REMISSION: Primary | ICD-10-CM

## 2024-05-30 DIAGNOSIS — J32.9 CHRONIC SINUSITIS, UNSPECIFIED LOCATION: ICD-10-CM

## 2024-05-30 DIAGNOSIS — E66.9 OBESITY (BMI 30-39.9): ICD-10-CM

## 2024-05-30 DIAGNOSIS — J44.9 CHRONIC OBSTRUCTIVE PULMONARY DISEASE, UNSPECIFIED COPD TYPE (MULTI): ICD-10-CM

## 2024-05-30 DIAGNOSIS — G47.33 OSA (OBSTRUCTIVE SLEEP APNEA): ICD-10-CM

## 2024-05-30 LAB
BACTERIA UR CULT: NORMAL
HOLD SPECIMEN: NORMAL

## 2024-05-30 PROCEDURE — 3074F SYST BP LT 130 MM HG: CPT | Performed by: INTERNAL MEDICINE

## 2024-05-30 PROCEDURE — 99215 OFFICE O/P EST HI 40 MIN: CPT | Performed by: INTERNAL MEDICINE

## 2024-05-30 PROCEDURE — 3050F LDL-C >= 130 MG/DL: CPT | Performed by: INTERNAL MEDICINE

## 2024-05-30 PROCEDURE — 1160F RVW MEDS BY RX/DR IN RCRD: CPT | Performed by: INTERNAL MEDICINE

## 2024-05-30 PROCEDURE — 1036F TOBACCO NON-USER: CPT | Performed by: INTERNAL MEDICINE

## 2024-05-30 PROCEDURE — 3078F DIAST BP <80 MM HG: CPT | Performed by: INTERNAL MEDICINE

## 2024-05-30 PROCEDURE — 3044F HG A1C LEVEL LT 7.0%: CPT | Performed by: INTERNAL MEDICINE

## 2024-05-30 PROCEDURE — 1159F MED LIST DOCD IN RCRD: CPT | Performed by: INTERNAL MEDICINE

## 2024-05-30 RX ORDER — ALBUTEROL SULFATE 90 UG/1
1-2 AEROSOL, METERED RESPIRATORY (INHALATION) EVERY 4 HOURS PRN
Qty: 18 G | Refills: 1 | Status: SHIPPED | OUTPATIENT
Start: 2024-05-30

## 2024-05-30 RX ORDER — FLUTICASONE FUROATE, UMECLIDINIUM BROMIDE AND VILANTEROL TRIFENATATE 200; 62.5; 25 UG/1; UG/1; UG/1
1 POWDER RESPIRATORY (INHALATION) DAILY
Qty: 30 EACH | Refills: 11 | Status: SHIPPED | OUTPATIENT
Start: 2024-05-30 | End: 2025-05-30

## 2024-05-30 RX ORDER — AZELASTINE 1 MG/ML
1 SPRAY, METERED NASAL 2 TIMES DAILY
Qty: 30 ML | Refills: 0 | Status: SHIPPED | OUTPATIENT
Start: 2024-05-30 | End: 2024-07-29

## 2024-05-30 ASSESSMENT — ENCOUNTER SYMPTOMS
VOMITING: 0
LIGHT-HEADEDNESS: 1
BACK PAIN: 0
COUGH: 1
DIZZINESS: 1
SHORTNESS OF BREATH: 1
CHEST TIGHTNESS: 0
NECK PAIN: 0
CONSTIPATION: 0
RHINORRHEA: 0
HEADACHES: 0
ARTHRALGIAS: 0
FEVER: 0
APPETITE CHANGE: 0
WOUND: 0
NAUSEA: 1
EYE PAIN: 0
WHEEZING: 1
SINUS PRESSURE: 0
ABDOMINAL PAIN: 1
EYE REDNESS: 0
FATIGUE: 1
UNEXPECTED WEIGHT CHANGE: 1
CHILLS: 0
PALPITATIONS: 0
SORE THROAT: 0
DYSPHORIC MOOD: 0
DYSURIA: 0
MYALGIAS: 0
SEIZURES: 0
HEMATURIA: 0
NERVOUS/ANXIOUS: 0
EYE ITCHING: 0
CONFUSION: 0
DIARRHEA: 0
SINUS PAIN: 0

## 2024-05-30 ASSESSMENT — ASTHMA QUESTIONNAIRES
QUESTION_4 LAST FOUR WEEKS HOW OFTEN HAVE YOU USED YOUR RESCUE INHALER OR NEBULIZER MEDICATION (SUCH AS ALBUTEROL): NOT AT ALL
QUESTION_1 LAST FOUR WEEKS HOW MUCH OF THE TIME DID YOUR ASTHMA KEEP YOU FROM GETTING AS MUCH DONE AT WORK, SCHOOL OR AT HOME: NONE OF THE TIME
QUESTION_2 LAST FOUR WEEKS HOW OFTEN HAVE YOU HAD SHORTNESS OF BREATH: NOT AT ALL
ACT_TOTALSCORE: 24
QUESTION_3 LAST FOUR WEEKS HOW OFTEN DID YOUR ASTHMA SYMPTOMS (WHEEZING, COUGHING, SHORTNESS OF BREATH, CHEST TIGHTNESS OR PAIN) WAKE YOU UP AT NIGHT OR EARLIER THAN USUAL IN THE MORNING: NOT AT ALL
QUESTION_5 LAST FOUR WEEKS HOW WOULD YOU RATE YOUR ASTHMA CONTROL: WELL CONTROLLED

## 2024-05-30 NOTE — PROGRESS NOTES
Subjective   Patient ID: Isabelle Abel is a 63 y.o. female who presents for COPD and Asthma.  Asthma  She complains of cough, shortness of breath and wheezing. Associated symptoms include postnasal drip. Pertinent negatives include no appetite change, chest pain, fever, headaches, myalgias, rhinorrhea or sore throat. Her past medical history is significant for asthma.   Patient with h/o COPD, Vertigo, HTN, COVID-19 in 11/2021, pre diabetic, gout, previously a patient of Dr. Gharibeh, now here for further management.   Last pulmonary visit 15 months ago, since then two EV visits one due to episode of COPD exacerbation, managed as outpatient.   Overall is feeling better than the last visit.  COPD was diagnosed in 2018, with a breathing test. Was told that she might have ACOS. Currently on Trelegy (uses it every day) and DuoNeb and ProAir. Uses her DuoNeb and albuterol together on average 1-2 times a month.  No nightly attacks.   No SOB at rest. POPE after walking 1 city block, or climbing 2 FOS. No orthopnea, no PND. +ve LE edema, improved since the last visit. POPE started since 2017, gradually, progressive but now improved since the last visit. Associated with wheezing but  no CP.   +ve wheezing, on average 1 time a week, some improvement with rescue inhaler.   +ve cough, chronic, moderate, at times productive of clear sputum.. Cough and sputum stable since the last visit. No hemoptysis.   Diagnosed with ADILSON years ago, AHI 11, not wearing CPAP. At time does not feel rested in am.      Exposure: quit smoking in 12/2022, > 40 pack/year h/o smoking. Worked for [employer] work, no known exposures. She reports of previous exposure to an active TB patient.   Current Outpatient Medications   Medication Instructions    albuterol 90 mcg/actuation inhaler 1-2 puffs, inhalation, Every 4 hours PRN    allopurinol (ZYLOPRIM) 300 mg, oral, Daily    amLODIPine (NORVASC) 2.5 mg, oral, Daily    aspirin 81 mg, oral, Daily     atorvastatin (LIPITOR) 80 mg, oral, Daily    azelastine (Astelin) 137 mcg (0.1 %) nasal spray 1 spray, Each Nostril, 2 times daily    blood-glucose meter (OneTouch Ultra2 Meter) misc Use to check glucose levels twice a day    clotrimazole-betamethasone (Lotrisone) cream 1 Application, Topical, 2 times daily    dorzolamide (Trusopt) 2 % ophthalmic solution No dose, route, or frequency recorded.    fluticasone (Flonase) 50 mcg/actuation nasal spray 1 spray, Does not apply, Daily RT    fluticasone-umeclidin-vilanter (Trelegy Ellipta) 200-62.5-25 mcg blister with device 1 puff, inhalation, Daily    ipratropium-albuteroL (Duo-Neb) 0.5-2.5 mg/3 mL nebulizer solution 3 mL, nebulization, Every 6 hours PRN    lancets (OneTouch Delica Plus Lancet) 30 gauge misc 2 Lancets, miscellaneous, Daily    meclizine (ANTIVERT) 25 mg, oral, 3 times daily PRN    metFORMIN (GLUCOPHAGE) 500 mg, oral, Daily with breakfast    metroNIDAZOLE (FLAGYL) 500 mg, oral, 3 times daily    nitroglycerin (NITROSTAT) 0.4 mg, sublingual, Every 5 min PRN, FOR UP TO 3 DOSES AS NEEDED FOR CHEST PAIN.CALL 911 IF PAIN PERSISTS.    OneTouch Ultra Test strip 2 strips, miscellaneous, Daily    sulfamethoxazole-trimethoprim (Bactrim DS) 800-160 mg tablet 1 tablet, oral, 2 times daily    triamcinolone (Kenalog) 0.1 % cream Topical, 2 times daily, APPLY AND RUB IN A THIN FILM TO AFFECTED AREAS TWICE DAILY.(AM AND PM).<BR>   Review of Systems   Constitutional:  Positive for fatigue and unexpected weight change (due to metformin). Negative for appetite change, chills and fever.   HENT:  Positive for postnasal drip. Negative for congestion, rhinorrhea, sinus pressure, sinus pain and sore throat.    Eyes:  Negative for pain, redness, itching and visual disturbance.   Respiratory:  Positive for cough, shortness of breath and wheezing. Negative for chest tightness.    Cardiovascular:  Positive for leg swelling. Negative for chest pain and palpitations.   Gastrointestinal:   Positive for abdominal pain and nausea. Negative for constipation, diarrhea and vomiting.   Genitourinary:  Negative for dysuria and hematuria.   Musculoskeletal:  Negative for arthralgias, back pain, myalgias and neck pain.   Skin:  Positive for rash (eczema). Negative for wound.   Neurological:  Positive for dizziness and light-headedness. Negative for seizures, syncope and headaches.   Psychiatric/Behavioral:  Negative for confusion and dysphoric mood. The patient is not nervous/anxious.    Objective   Visit Vitals  /78 (BP Location: Right arm, Patient Position: Sitting)   Temp 36.8 °C (98.2 °F) (Temporal)   Resp 18   Physical Exam  Constitutional:       General: She is not in acute distress.     Appearance: She is obese. She is not ill-appearing or toxic-appearing.   HENT:      Head: Normocephalic and atraumatic.      Nose:      Comments: On RA     Mouth/Throat:      Mouth: Mucous membranes are moist.      Comments: Mallampati 3-4  Eyes:      General: No scleral icterus.     Extraocular Movements: Extraocular movements intact.      Pupils: Pupils are equal, round, and reactive to light.   Cardiovascular:      Rate and Rhythm: Normal rate and regular rhythm.      Heart sounds: No murmur heard.     No friction rub. No gallop.   Pulmonary:      Effort: No respiratory distress.      Breath sounds: No wheezing or rales.      Comments: Fair air entry.  Abdominal:      General: Bowel sounds are normal. There is no distension.      Palpations: Abdomen is soft.      Tenderness: There is no abdominal tenderness.   Musculoskeletal:         General: No tenderness. Normal range of motion.      Cervical back: Normal range of motion and neck supple. No rigidity or tenderness.      Right lower leg: Edema (Trace) present.      Left lower leg: Edema (Trace) present.   Lymphadenopathy:      Cervical: No cervical adenopathy.   Skin:     General: Skin is warm and dry.      Coloration: Skin is not jaundiced.   Neurological:       General: No focal deficit present.      Mental Status: She is alert and oriented to person, place, and time.      Cranial Nerves: No cranial nerve deficit.      Motor: No weakness.   Psychiatric:         Mood and Affect: Mood normal.         Behavior: Behavior normal.   Results/Data  CAT from today24. ACT from today also 24  I personally reviewed the images for the chest CT from 11/2023 that showed a new 3.5 mm RLL nodule and emphysema.   I personally reviewed the images for the CXR from 2/2024 and 3/2024, both without an acute cardiopulmonary process.  Echo result from 1/2024 that showed: normal LV, RV, RVSF and RVSP 30.      The following were reviewed during the previous visits.       I personally reviewed the images for the chest CT and -rays from 11/2022, that did not show any significant abnormalities.   PSG result from 11/2022 reviewed that showed mild ADILSON with RDI 13, controlled with CPAP of 8.   I personally reviewed the images for the chest CT from 9/2021 that showed stable nodules. Official read as below.  Echoes from 8/2021 and 2/2022 reviewed. 8/2021: HFpEF, RVSP 12, normal RVSF, dilated RV. 2/2022: HFpEF, normal RV, reduced RVSF  Cardiac cath from 2/2022 showed 30-40% RCA and Left main stenosis.  CT calcium score 87  Stress test 9/2021 negative.   Echo result form 2017 reviewed that showed: HFpEF, Normal RV, RVSF, RVSP 28.   I personally interpreted the PFT from 8/2019 that showed: FEV1/FVC 68%, FEV1 102%, TLC 83%, DLCO 87%.   I personally reviewed the images for the chest CT from 8/2020 that showed 2-3 mm lung nodules. Official read as below.   Assessment/Plan   63 YOF, here for further management of her COPD.      1. COPD: GOLD 1, CAT today 24, with recent exacerbations (on average once a year), category D, but overall symptoms controlled and improved.    Continue Trelegy   continue DuoNeb + Albuterol as needed   if by the next visit symptoms not controlled, will refer to rehab and repeat PFT.       2. CHF:    referred to cardiology, management as per them.      3. ADILSON: not on CPAP, seen by sleep, split night PSG done that showed mild ADILSON and recommended APAP 8-15 with nasal pillow.    discussed the options, she wants to hold off CPAP with plan to lose weight first      4. Smoking: quit 12/2022. Qualifies for yearly chest CT. Last CT 11/2022 no nodules. Repeat CT in 11/2023 showed a new 3.5 mm RLL nodule.    need low dose chest CT in 12/2024.     5. PND: symptoms relatively controlled on Azelastine   continue Azelastine.      6. Obesity: patient has lose some weight after being started on Metformin.    advised on losing more weight.     RTC in six months.       Hunter Gong MD 05/30/24 11:39 AM

## 2024-05-30 NOTE — PATIENT INSTRUCTIONS
Ms Colten,     It was a pleasure to see you in the office today. We discussed the followings:      1. COPD: your breathing test showed that you have mild COPD, and  your symptoms seem to have improved. Please continue Trelegy and albuterol as needed     2. Heart failure: please follow up with cardiology for that.     3. Smoking: congratulations on quitting.     4. Allergic rhinitis: continue Azelastine.      5. Obesity: please make every effort to lose more  weight.      6. Sleep apnea: your sleep study showed you have mild sleep apnea. For now please lose weight, but we might need to start you on CPAP later.     Return to the office in six months.

## 2024-05-31 ENCOUNTER — APPOINTMENT (OUTPATIENT)
Dept: PHYSICAL THERAPY | Facility: CLINIC | Age: 64
End: 2024-05-31
Payer: COMMERCIAL

## 2024-06-06 ENCOUNTER — EVALUATION (OUTPATIENT)
Dept: PHYSICAL THERAPY | Facility: CLINIC | Age: 64
End: 2024-06-06
Payer: COMMERCIAL

## 2024-06-06 DIAGNOSIS — M62.89 PFD (PELVIC FLOOR DYSFUNCTION): Primary | ICD-10-CM

## 2024-06-06 PROCEDURE — 97161 PT EVAL LOW COMPLEX 20 MIN: CPT | Mod: GP

## 2024-06-06 PROCEDURE — 97110 THERAPEUTIC EXERCISES: CPT | Mod: GP

## 2024-06-06 ASSESSMENT — ENCOUNTER SYMPTOMS
LOSS OF SENSATION IN FEET: 0
DEPRESSION: 0
OCCASIONAL FEELINGS OF UNSTEADINESS: 1

## 2024-06-06 NOTE — PROGRESS NOTES
Physical Therapy    PELVIC FLOOR EVALUATION AND TREATMENT    Name: Isabelle Abel  MRN: [id number]  : 1960  Today's Date: 24     Time Calculation  Start Time: 310  Stop Time: 400  Time Calculation (min): 50 min  PT Evaluation Time Entry  PT Evaluation (Low) Time Entry: 35  PT Therapeutic Procedures Time Entry  Therapeutic Exercise Time Entry: 15        Insurance: [place]  $30 COPAY, NO AUTH REQ  Certification Period Start Date: 24  Certification Period End Date: 24  Visit: 1    Assessment:    Patient is a 64 yo referred for pelvic floor dysfunction who presents with complaints of dyspareunia, deep pelvic pain. She endorses no current urinary leakage, but reports history of incontinence with bouts of coughing while in COPD flare up. She is currently being treated for recent diverticulitis. Patient deferred internal and external examinations today; requests to complete at next visit. Initiated treatment to assist with management/reduction of symptoms. Patient would benefit from additional physical therapy interventions to assist with management of pain.    Plan:   Treatment/Interventions: Biofeedback, Education/ Instruction, Hot pack, Manual therapy, Neuromuscular re-education, Therapeutic activities, Therapeutic exercises, Self care/ home management  PT Plan: Skilled PT  PT Frequency:  (1 time/week every 1-2 weeks)  Duration: 6-8 visits  Certification Period Start Date: 24  Certification Period End Date: 24  Rehab Potential: Good  Plan of Care Agreement: Patient      Current Problem:  1. PFD (pelvic floor dysfunction)  Referral to Physical Therapy    Follow Up In Physical Therapy          Subjective   General  Reason for Referral: Diagnosis  M62.89 (ICD-10-CM) - PFD (pelvic floor dysfunction)  Referred By: Dr. Garo BEGUMADI Fall Risk Score (The score of 4 or more indicates an increased risk of falling): 4  Pain   0/10    Objective   PELVIC HISTORY:  Chief  Complaint/Description of Symptoms: when I have sex it hurts inside   Onset: 2 years ago - recalls pain started in different positions (from behind, sidelying)  HPI: recent diverticulitis and residual weakness currently  , bilateral hip replacements, CHF, COPD, HTN, DMII, MI  Home Environment/Social Factors/Occupation:   Patient Goal: pain relief  Patient is cleared for physical therapy services by physician referral.  Discussed concerns on intake forms. Patient is following up with medical providers to address.    PELVIC PAIN:     Description: sharp  Location: deep pelvic floor  Duration: during sexual intercourse (from behind, sidelying)    Since onset, the symptoms are: staying the same  Pain when emptying bladder: no  Pain with wiping or tight clothing: no  Pain with intercourse: yes  History of back pain: no    EXERCISE:  Do you do Kegels? no   Current exercise regime: walk the dog daily    BLADDER:     Excessive Urinary Urgency: no  Daytime Voiding Frequency: 3  Nighttime Voiding Frequency: 1  Unintentional urine loss frequency: no, but in 2024 has COPD flare up and does report leakage  Fluid Intake: 4 bottles of water, 1 cup of caffeine/week  Slow/weak urine stream: occasionally  Difficulty starting urine stream/push to urinate: no  Able to completely empty bladder: yes  Frequent UTI's: no    BOWEL:     Excessive Bowel Urgency: not currently with antibiotics, but did with recent diverticulitis flare up  BM Frequency: 1x/day  Frequent Diarrhea: no  Frequent constipation/straining/incomplete emptying: no  [place] Stool Scale rating: Type 1, 3, 5   Unintentional stool loss: only with coughing while currently taking milk of magnesia + antibiotics due to recent diverticulitis flare up (10 day regimen - following up with MD)    Gait: Indep ambulation Mod Indep with single point cane  Posture: forward head, rounded shoulders, decreased lumbar lordosis    Patient deferred internal and external  examinations today; requests to complete at next visit.    OUTCOMES MEASURE:  Female NIH - Chronic Prostatitis Symptom Index (NIH-CPSI): Pain: 13; Urinary Symptoms: 0; QOL Impact: 8; total: 21    Treatment:   Therapeutic Exercise: Education on modification or discontinuation of any exercise if adverse reaction arises.   Supine and seated diaphragmatic breathing, pelvic floor stretch, hip adduction stretch    Access Code: [email or web address]  URL: [email or web address]    HEP Exercises  - Supine Diaphragmatic Breathing  - 2-3 sets - 10 reps  - Seated Diaphragmatic Breathing  - 2-3 sets - 10 reps  - Supine Pelvic Floor Stretch  - 2-3 sets - 10 reps  - Supine Butterfly Groin Stretch  - 2-3 sets - 10 reps    OP Education: HEP handout, verbal, demonstration    Careplan Goals:  Problem: PT Problem       Goal: Voiding frequency of 5-8x per day with void interval of 1x per 2-5 hours          Goal: Able to participate in sexual intercourse with reduced/controlled symptom severity with all superficial and deep penetration for improved sexual health          Goal: Park and compliance with HEP and self management for home maintenance          Goal: Patient will report a 6 point improvement on NIH-CPSI to demonstrate a reduction in symptoms and increase quality of life           Dara Leija, PT

## 2024-06-12 ENCOUNTER — APPOINTMENT (OUTPATIENT)
Dept: GASTROENTEROLOGY | Facility: CLINIC | Age: 64
End: 2024-06-12
Payer: COMMERCIAL

## 2024-06-12 ENCOUNTER — CLINICAL SUPPORT (OUTPATIENT)
Dept: AUDIOLOGY | Facility: CLINIC | Age: 64
End: 2024-06-12
Payer: MEDICARE

## 2024-06-12 DIAGNOSIS — R42 DIZZINESS: Primary | ICD-10-CM

## 2024-06-12 PROCEDURE — 92700 UNLISTED ORL SERVICE/PX: CPT

## 2024-06-12 PROCEDURE — 92519 VEMP TST I&R CERVICAL&OCULAR: CPT

## 2024-06-12 PROCEDURE — 92540 BASIC VESTIBULAR EVALUATION: CPT

## 2024-06-12 PROCEDURE — 92537 CALORIC VSTBLR TEST W/REC: CPT

## 2024-06-12 NOTE — PROGRESS NOTES
ADULT BALANCE FUNCTION TEST (BFT)    Name:  Isabelle Abel  :  1960  Age:  64 y.o.  Date of Evaluation:  2024     IMPRESSIONS     Overall normal vestibular examination; no evidence of active vestibular system involvement to account for patient reported symptoms. There were no indications of peripheral or central vestibular system pathway involvement. There were no indications of active Benign Paroxysmal Positional Vertigo (BPPV) present. Abnormal observation of gait and transfers given use of ambulatory device. Bedside postural control testing was deferred on this date due to observed abnormal gait.  Patient is at risk of falling based on observation of gait.    Of note, evidence suggestive of possible Meniere's disease including present oVEMP responses at 2000 Hz in the right ear. Interpret with caution given patient reported history.     RECOMMENDATIONS     Continue with vestibular physical therapy with emphasis on fall risk prevention.  Consider re-evaluation as medically indicated.  Maintain a healthy lifestyle to help body function overall.  Continue monitoring per ENT/PCP preference.    Time: 8768-5808    HISTORY     Patient was seen for Balance Function Testing (BFT) due to a history of dizziness/imbalance. Vestibular case history collected via patient-clinician interview, patient chart review, and patient questionnaires.    Patient reported history of dizziness described as vertigo/spinning.  Symptoms began in early  and went away for several years before returning.  Episodes occur suddenly and last several seconds before subsiding.  Most recent episode occurred last Tuesday while patient was sitting.  Associated symptoms include fatigue, headaches. Patient denied ear symptoms.   Symptoms are provoked by loud sounds/music/environmental noise, busy visual environments, and laughing or talking loudly.  Symptoms can occur while sitting, standing, laying, and sometimes with head turns.  Overall  "the patient's symptoms have increased in severity over time. Of note, patient has participated in vestibular physical therapy several weeks ago and will resume following today's testing.   This patient has not fallen due to their symptoms.  Patient began using a cane while walking to feel more secure when an episode occurs.   Denied any symptoms provoked by sneezing or coughing.  Denied any recent medication changes.   Relevant medical history includes two hip replacements.  Most recent audiologic evaluation performed on 05/16/2024 by Reba Mccracken CCC-AMIE revealed essentially normal hearing sensitivity, bilaterally. Tympanometry revealed normal eardrum mobility and canal volume, bilaterally.  Patient reported complying with pre-test instructions.       EVALUATION     See VNG, vHIT, & VEMP Raw Data in \"Media\"    TEST RESULTS     BEDSIDE ASSESSMENT TEST  The bedside assessment is an optional portion of the test battery to further assist in differential diagnosis and screen for eye abnormalities which may affect testing.    Otoscopy: clear ear canals.  Modified Clinical Test of Sensory Interaction on Balance (mCTSIB): deferred on this date due to long term use of an ambulatory device (cane). mCTSIB is performed to evaluate balance with varying sensory information.      VIDEONYSTAGMOGRAPHY (VNG) TEST  VNG provides objective indications of peripheral and central vestibulo-ocular pathway involvement. Ocular motor testing to visually guided targets is conducted using a dual channel video-recording technique for the recording of eye movement in the horizontal and vertical planes. Air caloric testing is performed at 48 degrees C and 24 degrees C.    Spontaneous Nystagmus test was absent. Spontaneous nystagmus testing may help with the identification of an acute or uncompensated peripheral vestibular lesion.   Gaze Nystagmus test was normal. Gaze nystagmus testing is to evaluate for nystagmus that is evoked by holding eye " gaze in any particular direction. True gaze nystagmus is amplified when vision is denied.   Random Saccades test was normal. Random saccade testing is to evaluate patient's ability to make fast random eye movements along a horizontal moving target.   Smooth Pursuit/Tracking test was normal. Smooth pursuit/tracking testing is to evaluate the ability to move eyes with a single smoothly moving target.   Optokinetic nystagmus testing was normal. This full-field OPK test is to evaluate the ability of central nervous system to stabilize vision during sustained head movement after the VOR system loses effectiveness.   [place]-Hallpike testing was normal. Chip-Hallpike testing is to provide a diagnosis of Benign Paroxysmal Positional Vertigo (BPPV) of the vertical semicircular canals on the side which is most affected.  Roll testing was normal. Roll testing is to provide a diagnosis of Benign Paroxysmal Positional Vertigo (BPPV) of the horizontal semicircular canals on the side which is most affected.  Positional testing was normal. Positional testing is to evaluate patient's ability to hold a steady gaze while in different positions.  Bithermal caloric testing was normal. Unilateral weakness of 19% to the left which is normal and a directional preponderance of 19% to the left which is normal. Caloric testing is to evaluate for peripheral vestibular lesion.      VIDEO HEAD IMPULSE TEST (vHIT)  The vHIT procedure provides objective assessment of the high frequency vestibulo-ocular reflex (VOR) for each semicircular canal. Rapid, random horizontal and vertical thrusts are applied to the patient's head to provoke the VOR. The vHIT procedure includes two separate paradigms: Head Impulse Paradigm (HIMP) and Suppression Head Impulse Paradigm (SHIMP). SHIMP is an optional paradigm that is not appropriate to perform for every patient. However, it is appropriate to perform SHIMP when there is verified evidence of possible vestibulopathy  in the traditional HIMP test.     Head Impulse Paradigm (HIMP)   Right Ear   Canal Gain Overt Saccades Covert Saccades   Lateral 1.16 absent absent   Anterior 1.29 absent absent   Posterior 0.71 absent absent        Head Impulse Paradigm (HIMP)   Left Ear   Canal Gain Overt Saccades Covert Saccades   Lateral 1.00 absent absent   Anterior 0.93 absent absent   Posterior 0.99 absent absent     Total gain for all canals tested was within normal limits  (<0.80 is abnormal for lateral, <0.70 is abnormal for vertical).  There was no evidence of overt or covert saccades throughout testing        CERVICAL VESTIBULAR EVOKED MYOGENIC POTENTIALS (cVEMP):  The cVEMP procedure is an evoked potential used to test the saccule and its afferent pathway. An asymmetry ratio is utilized to determine side of lesion. The cVEMP was recorded with the patient cervical extension to produce isolated contraction of the ipsilateral sternocleidomastoid (SCM) muscle. The cVEMP was recorded using a 500 Hz tone burst or 1000 Hz tone burst at a rate of 5.1.      Ear Presentation Level Amplitude P1 Latency N1 Latency  Amplitude Asymmetry Ratio   Right 100 dB nHL 50.88 µV 15.67 ms 23.33 ms N/A due to difference in presentation level   Left 95 dB nHL 31.52 µV 14.33 ms 20.33 ms       Replicable cVEMP responses were within normal limits, bilaterally. The amplitude asymmetry ratio was not calculated due to difference in presentation level between ears.     Superior Canal Dehiscence Screening (75 dB nHL): Negative in the left ear. Did not test in the right ear due to absent cVEMP at 95 dB nHL.        OCULAR VESTIBULAR EVOKED MYOGENIC POTENTIALS (oVEMP)  The oVEMP procedure is an evoked potential used to test the utricle and its afferent pathway. An asymmetry ratio is utilized to determine side of lesion. This is a contralateral recording. The oVEMP was recorded with the patient seated upright with eyes tilted upward to produce isolated contraction of the  contralateral inferior oblique muscle. The oVEMP were recorded using a 500 Hz, 2000 Hz, 4000 Hz tone burst at a rate of 5.1.       Ear Presentation Level Amplitude N1 Latency  P1 Latency  Amplitude Asymmetry Ratio   Right 100 dB nHL 3.91 µV 13.33 ms 16.67 ms N/A due to difference in presentation level   Left 95 dB nHL 3.70 µV 9.67 ms 13.67 ms       Replicable oVEMP responses were within normal limits, bilaterally.  The amplitude asymmetry ratio was not calculated due to difference in presentation level between ears.     Meniere's Disease Screening (2000 Hz): Positive in the right ear, negative in the left ear.  Superior Canal Dehiscence Screening (4000 Hz): Negative bilaterally      Testing and interpretation of results completed by Reba Davila CCC-A CCVR and Reba Macdonald, CCC-AMIE. It was our pleasure to evaluate this patient.         Reba Davila, CCC-A CCVR  Senior Clinical Vestibular Audiologist

## 2024-06-20 ENCOUNTER — APPOINTMENT (OUTPATIENT)
Dept: PRIMARY CARE | Facility: CLINIC | Age: 64
End: 2024-06-20
Payer: COMMERCIAL

## 2024-07-02 ENCOUNTER — TELEPHONE (OUTPATIENT)
Dept: OTOLARYNGOLOGY | Facility: CLINIC | Age: 64
End: 2024-07-02
Payer: COMMERCIAL

## 2024-07-11 ENCOUNTER — DOCUMENTATION (OUTPATIENT)
Dept: PHYSICAL THERAPY | Facility: CLINIC | Age: 64
End: 2024-07-11
Payer: COMMERCIAL

## 2024-07-11 ENCOUNTER — HOSPITAL ENCOUNTER (OUTPATIENT)
Dept: RADIOLOGY | Facility: CLINIC | Age: 64
Discharge: HOME | End: 2024-07-11
Payer: MEDICARE

## 2024-07-11 ENCOUNTER — APPOINTMENT (OUTPATIENT)
Dept: PHYSICAL THERAPY | Facility: CLINIC | Age: 64
End: 2024-07-11
Payer: MEDICARE

## 2024-07-11 VITALS — HEIGHT: 65 IN | BODY MASS INDEX: 38.2 KG/M2 | WEIGHT: 229.28 LBS

## 2024-07-11 DIAGNOSIS — N63.0 UNSPECIFIED LUMP IN UNSPECIFIED BREAST: ICD-10-CM

## 2024-07-11 DIAGNOSIS — N63.11 LUMP IN UPPER OUTER QUADRANT OF RIGHT BREAST: ICD-10-CM

## 2024-07-11 DIAGNOSIS — Z12.31 OTHER SCREENING MAMMOGRAM: ICD-10-CM

## 2024-07-11 PROCEDURE — 76642 ULTRASOUND BREAST LIMITED: CPT | Mod: RT

## 2024-07-11 PROCEDURE — 77062 BREAST TOMOSYNTHESIS BI: CPT

## 2024-07-11 PROCEDURE — 76981 USE PARENCHYMA: CPT | Mod: RT

## 2024-07-11 NOTE — PROGRESS NOTES
Physical Therapy                 Therapy Communication Note    Patient Name: Isabelle Abel  MRN: [id number]  Today's Date: 7/11/2024     Discipline: Physical Therapy    Missed Visit Reason:    Patient: Canceled via MyChart (Auto being repaired)       Missed Time: Cancel     Comment:

## 2024-07-16 ENCOUNTER — APPOINTMENT (OUTPATIENT)
Dept: OTOLARYNGOLOGY | Facility: CLINIC | Age: 64
End: 2024-07-16
Payer: COMMERCIAL

## 2024-07-18 ENCOUNTER — APPOINTMENT (OUTPATIENT)
Dept: PHYSICAL THERAPY | Facility: CLINIC | Age: 64
End: 2024-07-18
Payer: MEDICARE

## 2024-07-22 ENCOUNTER — APPOINTMENT (OUTPATIENT)
Dept: PRIMARY CARE | Facility: CLINIC | Age: 64
End: 2024-07-22
Payer: COMMERCIAL

## 2024-07-22 VITALS
OXYGEN SATURATION: 96 % | HEART RATE: 82 BPM | SYSTOLIC BLOOD PRESSURE: 113 MMHG | HEIGHT: 66 IN | WEIGHT: 224.43 LBS | TEMPERATURE: 97.2 F | DIASTOLIC BLOOD PRESSURE: 89 MMHG | RESPIRATION RATE: 16 BRPM | BODY MASS INDEX: 36.07 KG/M2

## 2024-07-22 DIAGNOSIS — E55.9 VITAMIN D DEFICIENCY: ICD-10-CM

## 2024-07-22 DIAGNOSIS — Z00.00 ENCOUNTER FOR ANNUAL PHYSICAL EXAM: Primary | ICD-10-CM

## 2024-07-22 DIAGNOSIS — Z12.11 ENCOUNTER FOR SCREENING COLONOSCOPY: ICD-10-CM

## 2024-07-22 DIAGNOSIS — M1A.9XX0 CHRONIC GOUT INVOLVING TOE OF RIGHT FOOT WITHOUT TOPHUS, UNSPECIFIED CAUSE: ICD-10-CM

## 2024-07-22 DIAGNOSIS — I10 BENIGN ESSENTIAL HYPERTENSION: ICD-10-CM

## 2024-07-22 DIAGNOSIS — E78.00 HYPERCHOLESTEROLEMIA: ICD-10-CM

## 2024-07-22 DIAGNOSIS — E11.9 TYPE 2 DIABETES MELLITUS WITHOUT COMPLICATION, WITHOUT LONG-TERM CURRENT USE OF INSULIN (MULTI): ICD-10-CM

## 2024-07-22 PROBLEM — F17.200 CURRENT SMOKER: Status: RESOLVED | Noted: 2023-10-17 | Resolved: 2024-07-22

## 2024-07-22 LAB
POC ALBUMIN /CREATININE RATIO MANUALLY ENTERED: <30 UG/MG CREAT
POC HEMOGLOBIN A1C: 6.1 % (ref 4.2–6.5)
POC URINE ALBUMIN: 30 MG/L
POC URINE CREATININE: 50 MG/DL

## 2024-07-22 PROCEDURE — 82044 UR ALBUMIN SEMIQUANTITATIVE: CPT | Performed by: INTERNAL MEDICINE

## 2024-07-22 PROCEDURE — 3008F BODY MASS INDEX DOCD: CPT | Performed by: INTERNAL MEDICINE

## 2024-07-22 PROCEDURE — 3044F HG A1C LEVEL LT 7.0%: CPT | Performed by: INTERNAL MEDICINE

## 2024-07-22 PROCEDURE — 1159F MED LIST DOCD IN RCRD: CPT | Performed by: INTERNAL MEDICINE

## 2024-07-22 PROCEDURE — 1125F AMNT PAIN NOTED PAIN PRSNT: CPT | Performed by: INTERNAL MEDICINE

## 2024-07-22 PROCEDURE — 99397 PER PM REEVAL EST PAT 65+ YR: CPT | Performed by: INTERNAL MEDICINE

## 2024-07-22 PROCEDURE — 3074F SYST BP LT 130 MM HG: CPT | Performed by: INTERNAL MEDICINE

## 2024-07-22 PROCEDURE — 1036F TOBACCO NON-USER: CPT | Performed by: INTERNAL MEDICINE

## 2024-07-22 PROCEDURE — 3050F LDL-C >= 130 MG/DL: CPT | Performed by: INTERNAL MEDICINE

## 2024-07-22 PROCEDURE — 1160F RVW MEDS BY RX/DR IN RCRD: CPT | Performed by: INTERNAL MEDICINE

## 2024-07-22 PROCEDURE — 83036 HEMOGLOBIN GLYCOSYLATED A1C: CPT | Performed by: INTERNAL MEDICINE

## 2024-07-22 PROCEDURE — 3079F DIAST BP 80-89 MM HG: CPT | Performed by: INTERNAL MEDICINE

## 2024-07-22 ASSESSMENT — ENCOUNTER SYMPTOMS
LOSS OF SENSATION IN FEET: 0
DEPRESSION: 0
OCCASIONAL FEELINGS OF UNSTEADINESS: 0
DIZZINESS: 1

## 2024-07-22 ASSESSMENT — PAIN SCALES - GENERAL: PAINLEVEL: 8

## 2024-07-22 NOTE — ASSESSMENT & PLAN NOTE
Recommend influenza vaccine in fall.  She has had Prevnar 20 vaccine, Shingrix vaccine.  Her last mammogram was in July of this year.  Her last bone density scan in June 2023.  Her last colonoscopy was in 2019.  Has had small 2 mm polyp.  Recommendation was to return in 5 years.  Referral made to have colonoscopy this year.  Schedule low intensity CT scan of the chest for lung cancer screening.  Continue to follow low-cholesterol diet, diabetic diet avoid rice potatoes pasta sweets.  Increase physical activity to lose weight.  Follow-up in 6 months.

## 2024-07-22 NOTE — ASSESSMENT & PLAN NOTE
Diabetes: Type 2 - check HbA1C - Educate sugar free and low carb diet.  Increase physical activity.  Educate Medication and diet compliance.HbA1c done in the office  was 6.1.  Continue diet and Metformin.

## 2024-07-22 NOTE — ASSESSMENT & PLAN NOTE
Blood pressure controlled without taking amlodipine 2.5 mg.  Continue to follow low-sodium diet do not exceed 200 mg per serving.  Advised lifestyle modifications increase physical activity and weight loss.

## 2024-07-22 NOTE — PROGRESS NOTES
"Subjective   Patient ID: Isabelle Abel is a 64 y.o. female who presents for Annual Exam.    Annual physical exam.  She has hypercholesterolemia diabetes type 2, obstructive sleep apnea, gout, BMI 35, hypertension, low GFR.  Former smoker quit 11-month ago, his COPD follows up with pulmonary specialist.  In May she has had abdominal pain diagnosed with diverticulitis.  History of dizziness, has seen ENT in consult, was told it is not vertigo.  She stopped taking amlodipine 2.5 mg daily to see if it was causing dizziness.  Her dizziness stopped.       Review of Systems   Musculoskeletal:         Lt. Heel pain   Neurological:  Positive for dizziness.   All other systems reviewed and are negative.      Objective   /89 (BP Location: Left arm, Patient Position: Sitting)   Pulse 82   Temp 36.2 °C (97.2 °F) (Temporal)   Resp 16   Ht 1.685 m (5' 6.34\")   Wt 102 kg (224 lb 6.9 oz)   SpO2 96%   BMI 35.85 kg/m²     Physical Exam  Constitutional:       Appearance: Normal appearance. She is obese.   HENT:      Head: Normocephalic and atraumatic.      Right Ear: External ear normal.      Left Ear: External ear normal.      Mouth/Throat:      Mouth: Mucous membranes are moist.      Pharynx: Oropharynx is clear.   Neck:      Vascular: No carotid bruit.   Cardiovascular:      Rate and Rhythm: Normal rate and regular rhythm.      Pulses:           Dorsalis pedis pulses are 3+ on the right side and 3+ on the left side.        Posterior tibial pulses are 3+ on the right side and 3+ on the left side.      Heart sounds: No murmur heard.     No gallop.   Pulmonary:      Effort: Pulmonary effort is normal. No respiratory distress.      Breath sounds: No wheezing or rales.   Abdominal:      General: Abdomen is flat.      Palpations: Abdomen is soft.      Hernia: No hernia is present.   Musculoskeletal:         General: No swelling, tenderness or signs of injury.      Cervical back: No rigidity or tenderness.      Right lower " leg: No edema.      Right foot: Deformity present.      Left foot: Deformity present.   Feet:      Right foot:      Protective Sensation: 10 sites tested.  10 sites sensed.      Toenail Condition: Right toenails are normal.      Left foot:      Protective Sensation: 10 sites tested.  10 sites sensed.      Skin integrity: Callus present.      Toenail Condition: Left toenails are normal.   Lymphadenopathy:      Cervical: No cervical adenopathy.   Skin:     Coloration: Skin is not jaundiced or pale.      Findings: No bruising, erythema, lesion or rash.   Neurological:      General: No focal deficit present.      Mental Status: She is oriented to person, place, and time.      Motor: No weakness.      Coordination: Coordination normal.   Psychiatric:         Mood and Affect: Mood normal.         Behavior: Behavior normal.       Assessment/Plan   Problem List Items Addressed This Visit             ICD-10-CM    Benign essential hypertension I10     Blood pressure controlled without taking amlodipine 2.5 mg.  Continue to follow low-sodium diet do not exceed 200 mg per serving.  Advised lifestyle modifications increase physical activity and weight loss.         Gout M10.9    Relevant Orders    Uric Acid    Hypercholesterolemia E78.00     Hypercholesterolemia: check lipid profile.   Educate low cholesterol diet , avoid fast foods , processed meats and fried foods, red meat.  Increase physical activity.  Keep a low carb diet.             Relevant Orders    Lipid Panel    Type 2 diabetes mellitus (Multi) E11.9     Diabetes: Type 2 - check HbA1C - Educate sugar free and low carb diet.  Increase physical activity.  Educate Medication and diet compliance.HbA1c done in the office  was 6.1.  Continue diet and Metformin.             Relevant Orders    POCT Albumin Random Urine manually resulted (Completed)    POCT glycosylated hemoglobin (Hb A1C) manually resulted (Completed)    Comprehensive Metabolic Panel    Hemoglobin A1C     Vitamin D deficiency E55.9    Relevant Orders    Vitamin D 25-Hydroxy,Total (for eval of Vitamin D levels)    Encounter for annual physical exam - Primary Z00.00     Recommend influenza vaccine in fall.  She has had Prevnar 20 vaccine, Shingrix vaccine.  Her last mammogram was in July of this year.  Her last bone density scan in June 2023.  Her last colonoscopy was in 2019.  Has had small 2 mm polyp.  Recommendation was to return in 5 years.  Referral made to have colonoscopy this year.  Schedule low intensity CT scan of the chest for lung cancer screening.  Continue to follow low-cholesterol diet, diabetic diet avoid rice potatoes pasta sweets.  Increase physical activity to lose weight.  Follow-up in 6 months.          Other Visit Diagnoses         Codes    Encounter for screening colonoscopy     Z12.11    Relevant Orders    Colonoscopy Screening; Average Risk Patient

## 2024-07-25 ENCOUNTER — APPOINTMENT (OUTPATIENT)
Dept: PHYSICAL THERAPY | Facility: CLINIC | Age: 64
End: 2024-07-25
Payer: MEDICARE

## 2024-08-06 ENCOUNTER — APPOINTMENT (OUTPATIENT)
Dept: OTOLARYNGOLOGY | Facility: CLINIC | Age: 64
End: 2024-08-06
Payer: COMMERCIAL

## 2024-08-06 VITALS — WEIGHT: 224 LBS | HEIGHT: 66 IN | TEMPERATURE: 97.2 F | BODY MASS INDEX: 36 KG/M2

## 2024-08-06 DIAGNOSIS — R42 DIZZINESS: ICD-10-CM

## 2024-08-06 DIAGNOSIS — H90.42 SENSORINEURAL HEARING LOSS (SNHL) OF LEFT EAR WITH UNRESTRICTED HEARING OF RIGHT EAR: ICD-10-CM

## 2024-08-06 DIAGNOSIS — R42 VERTIGO: ICD-10-CM

## 2024-08-06 DIAGNOSIS — H90.3 ASNHL (ASYMMETRICAL SENSORINEURAL HEARING LOSS): Primary | ICD-10-CM

## 2024-08-06 DIAGNOSIS — G47.33 OBSTRUCTIVE SLEEP APNEA: ICD-10-CM

## 2024-08-06 PROCEDURE — 1036F TOBACCO NON-USER: CPT | Performed by: OTOLARYNGOLOGY

## 2024-08-06 PROCEDURE — 1160F RVW MEDS BY RX/DR IN RCRD: CPT | Performed by: OTOLARYNGOLOGY

## 2024-08-06 PROCEDURE — 3044F HG A1C LEVEL LT 7.0%: CPT | Performed by: OTOLARYNGOLOGY

## 2024-08-06 PROCEDURE — 3008F BODY MASS INDEX DOCD: CPT | Performed by: OTOLARYNGOLOGY

## 2024-08-06 PROCEDURE — 1159F MED LIST DOCD IN RCRD: CPT | Performed by: OTOLARYNGOLOGY

## 2024-08-06 PROCEDURE — 99214 OFFICE O/P EST MOD 30 MIN: CPT | Performed by: OTOLARYNGOLOGY

## 2024-08-06 PROCEDURE — 3050F LDL-C >= 130 MG/DL: CPT | Performed by: OTOLARYNGOLOGY

## 2024-08-06 NOTE — PROGRESS NOTES
Subjective   Patient ID: Isabelle Abel is a 64 y.o. female  HPI  Patient presents for follow-up for recurrent episodes of vertigo.  She has been feeling better after her blood pressure medication was discontinued recently.  She has no otalgia and no otorrhea.  She has not noticed any change in the hearing.  Review of Systems    Objective   Physical Exam  The ear canals are clear.  The tympanic membranes are clear and mobile.  There is no spontaneous nystagmus noted.  The hearing test reveals asymmetric sensorineural hearing loss on the left side.  The tympanograms are normal.  The VNG test is normal.    Assessment/Plan   Diagnoses and all orders for this visit:  ASNHL (asymmetrical sensorineural hearing loss) (Primary)  -     Creatinine; Future  -     Blood Urea Nitrogen; Future  -     MR IAC w and wo IV contrast; Future  Vertigo  -     Referral to ENT  Dizziness  Obstructive sleep apnea  Sensorineural hearing loss (SNHL) of left ear with unrestricted hearing of right ear  -     Creatinine; Future  -     Blood Urea Nitrogen; Future  -     MR IAC w and wo IV contrast; Future     1.  Chronic sensation of dizziness and imbalance most likely secondary to orthostasis resolved after discontinuing the blood pressure medication with otherwise normal VNG test.  2.  Chronic asymmetric sensorineural hearing loss on the left side.  The patient was scheduled for an MRI of the brain IACs with contrast to rule out a retrocochlear lesion such as acoustic neuroma or multiple sclerosis or CVA.  BUN and creatinine were also ordered to evaluate her renal function prior to administration of the MRI contrast.  3.  Chronic obstructive sleep apnea controlled with CPAP.

## 2024-08-08 ENCOUNTER — APPOINTMENT (OUTPATIENT)
Dept: PHYSICAL THERAPY | Facility: CLINIC | Age: 64
End: 2024-08-08
Payer: MEDICARE

## 2024-08-17 ENCOUNTER — HOSPITAL ENCOUNTER (OUTPATIENT)
Dept: RADIOLOGY | Facility: HOSPITAL | Age: 64
Discharge: HOME | End: 2024-08-17
Payer: COMMERCIAL

## 2024-08-17 DIAGNOSIS — H90.3 ASNHL (ASYMMETRICAL SENSORINEURAL HEARING LOSS): ICD-10-CM

## 2024-08-17 DIAGNOSIS — H90.42 SENSORINEURAL HEARING LOSS (SNHL) OF LEFT EAR WITH UNRESTRICTED HEARING OF RIGHT EAR: ICD-10-CM

## 2024-08-17 PROCEDURE — 2550000001 HC RX 255 CONTRASTS: Performed by: OTOLARYNGOLOGY

## 2024-08-17 PROCEDURE — 70553 MRI BRAIN STEM W/O & W/DYE: CPT | Performed by: RADIOLOGY

## 2024-08-17 PROCEDURE — A9575 INJ GADOTERATE MEGLUMI 0.1ML: HCPCS | Performed by: OTOLARYNGOLOGY

## 2024-08-17 PROCEDURE — 70553 MRI BRAIN STEM W/O & W/DYE: CPT

## 2024-08-17 RX ORDER — GADOTERATE MEGLUMINE 376.9 MG/ML
20 INJECTION INTRAVENOUS
Status: COMPLETED | OUTPATIENT
Start: 2024-08-17 | End: 2024-08-17

## 2024-08-17 RX ADMIN — GADOTERATE MEGLUMINE 20 ML: 376.9 INJECTION INTRAVENOUS at 09:59

## 2024-08-21 ENCOUNTER — LAB (OUTPATIENT)
Dept: LAB | Facility: LAB | Age: 64
End: 2024-08-21
Payer: COMMERCIAL

## 2024-08-21 ENCOUNTER — OFFICE VISIT (OUTPATIENT)
Dept: CARDIOLOGY | Facility: CLINIC | Age: 64
End: 2024-08-21
Payer: MEDICARE

## 2024-08-21 VITALS
WEIGHT: 224.19 LBS | DIASTOLIC BLOOD PRESSURE: 72 MMHG | OXYGEN SATURATION: 92 % | BODY MASS INDEX: 36.03 KG/M2 | HEART RATE: 109 BPM | SYSTOLIC BLOOD PRESSURE: 107 MMHG | HEIGHT: 66 IN

## 2024-08-21 DIAGNOSIS — E11.9 CONTROLLED TYPE 2 DIABETES MELLITUS WITHOUT COMPLICATION, WITHOUT LONG-TERM CURRENT USE OF INSULIN (MULTI): ICD-10-CM

## 2024-08-21 DIAGNOSIS — R07.9 CHEST PAIN, UNSPECIFIED TYPE: ICD-10-CM

## 2024-08-21 DIAGNOSIS — R07.9 CHEST PAIN AT REST: Primary | ICD-10-CM

## 2024-08-21 DIAGNOSIS — R07.9 CHEST PAIN AT REST: ICD-10-CM

## 2024-08-21 DIAGNOSIS — M1A.9XX0 CHRONIC GOUT WITHOUT TOPHUS, UNSPECIFIED CAUSE, UNSPECIFIED SITE: ICD-10-CM

## 2024-08-21 LAB
BNP SERPL-MCNC: 17 PG/ML (ref 0–99)
CARDIAC TROPONIN I PNL SERPL HS: 4 NG/L (ref 0–34)

## 2024-08-21 PROCEDURE — 1036F TOBACCO NON-USER: CPT | Performed by: INTERNAL MEDICINE

## 2024-08-21 PROCEDURE — 1160F RVW MEDS BY RX/DR IN RCRD: CPT | Performed by: INTERNAL MEDICINE

## 2024-08-21 PROCEDURE — 3078F DIAST BP <80 MM HG: CPT | Performed by: INTERNAL MEDICINE

## 2024-08-21 PROCEDURE — 99214 OFFICE O/P EST MOD 30 MIN: CPT | Performed by: INTERNAL MEDICINE

## 2024-08-21 PROCEDURE — 84484 ASSAY OF TROPONIN QUANT: CPT

## 2024-08-21 PROCEDURE — 3008F BODY MASS INDEX DOCD: CPT | Performed by: INTERNAL MEDICINE

## 2024-08-21 PROCEDURE — 83880 ASSAY OF NATRIURETIC PEPTIDE: CPT

## 2024-08-21 PROCEDURE — 1159F MED LIST DOCD IN RCRD: CPT | Performed by: INTERNAL MEDICINE

## 2024-08-21 PROCEDURE — 3044F HG A1C LEVEL LT 7.0%: CPT | Performed by: INTERNAL MEDICINE

## 2024-08-21 PROCEDURE — 3074F SYST BP LT 130 MM HG: CPT | Performed by: INTERNAL MEDICINE

## 2024-08-21 PROCEDURE — 93005 ELECTROCARDIOGRAM TRACING: CPT | Performed by: INTERNAL MEDICINE

## 2024-08-21 PROCEDURE — 93010 ELECTROCARDIOGRAM REPORT: CPT | Performed by: INTERNAL MEDICINE

## 2024-08-21 PROCEDURE — 3050F LDL-C >= 130 MG/DL: CPT | Performed by: INTERNAL MEDICINE

## 2024-08-21 PROCEDURE — 1125F AMNT PAIN NOTED PAIN PRSNT: CPT | Performed by: INTERNAL MEDICINE

## 2024-08-21 PROCEDURE — 99214 OFFICE O/P EST MOD 30 MIN: CPT | Mod: 25 | Performed by: INTERNAL MEDICINE

## 2024-08-21 RX ORDER — ALLOPURINOL 300 MG/1
300 TABLET ORAL DAILY
Qty: 90 TABLET | Refills: 1 | Status: SHIPPED | OUTPATIENT
Start: 2024-08-21

## 2024-08-21 RX ORDER — AMINOPHYLLINE 25 MG/ML
125 INJECTION, SOLUTION INTRAVENOUS ONCE AS NEEDED
OUTPATIENT
Start: 2024-08-21

## 2024-08-21 RX ORDER — REGADENOSON 0.08 MG/ML
0.4 INJECTION, SOLUTION INTRAVENOUS
OUTPATIENT
Start: 2024-08-21

## 2024-08-21 RX ORDER — METFORMIN HYDROCHLORIDE 500 MG/1
500 TABLET ORAL
Qty: 90 TABLET | Refills: 1 | Status: SHIPPED | OUTPATIENT
Start: 2024-08-21

## 2024-08-21 ASSESSMENT — COLUMBIA-SUICIDE SEVERITY RATING SCALE - C-SSRS
1. IN THE PAST MONTH, HAVE YOU WISHED YOU WERE DEAD OR WISHED YOU COULD GO TO SLEEP AND NOT WAKE UP?: NO
2. HAVE YOU ACTUALLY HAD ANY THOUGHTS OF KILLING YOURSELF?: NO
6. HAVE YOU EVER DONE ANYTHING, STARTED TO DO ANYTHING, OR PREPARED TO DO ANYTHING TO END YOUR LIFE?: NO

## 2024-08-21 ASSESSMENT — ENCOUNTER SYMPTOMS
LOSS OF SENSATION IN FEET: 0
DEPRESSION: 0
OCCASIONAL FEELINGS OF UNSTEADINESS: 1

## 2024-08-21 ASSESSMENT — PATIENT HEALTH QUESTIONNAIRE - PHQ9
SUM OF ALL RESPONSES TO PHQ9 QUESTIONS 1 AND 2: 0
1. LITTLE INTEREST OR PLEASURE IN DOING THINGS: NOT AT ALL
2. FEELING DOWN, DEPRESSED OR HOPELESS: NOT AT ALL

## 2024-08-21 ASSESSMENT — PAIN SCALES - GENERAL: PAINLEVEL: 5

## 2024-08-21 NOTE — PROGRESS NOTES
Primary Care Physician: Kristina Tellez MD  Date of Visit: 08/21/2024  3:40 PM EDT  Location of visit: [facility] [address]     Chief Complaint:   Chief Complaint   Patient presents with    Follow-up     Chest pain    Hypertension        HPI / Summary:   Isabelle Abel is a 64 y.o. female presents for followup.     Today, elevated BMI, February 2022 non-STEMI with nonobstructive CAD.  Mild to moderate AI last seen on March 2023  Naggging left sided CP, took NTG last night and felt better.    Like something twisting in chest.    Walks with cane.    New and looks fine.  No cough or fever  Starting a new business, feels nervous.  No ETOh or tobacco.    Not feeling ill.   In May diverticulitis.      Specialty Problems          Cardiology Problems    Atherosclerosis of native coronary artery of native heart without angina pectoris    Benign essential hypertension    Chronic diastolic congestive heart failure (Multi)    Hypercholesterolemia    Hyperlipemia    Mild aortic insufficiency    Nicotine dependence    NSTEMI, initial episode of care (Multi)    Past myocardial infarction    Shortness of breath on exertion        Past Medical History:   Diagnosis Date    Asthma ([facility]     Encounter for follow-up examination after completed treatment for conditions other than malignant neoplasm 05/20/2021    Hospital discharge follow-up    Encounter for gynecological examination (general) (routine) without abnormal findings 03/11/2019    Encounter for annual routine gynecological examination    Hypertension     Non-ST elevation (NSTEMI) myocardial infarction (Multi) 02/28/2022    Non-ST elevation myocardial infarction (NSTEMI) in recovery phase    Personal history of other diseases of the respiratory system 05/19/2018    History of chronic obstructive lung disease    Zoster without complications 10/14/2020    Herpes zoster without complication          Past Surgical History:   Procedure Laterality Date    BREAST BIOPSY      OTHER  SURGICAL HISTORY  04/10/2014    Uterine Surgery    OTHER SURGICAL HISTORY  2019    Dilation and curettage    OTHER SURGICAL HISTORY  2019    Surgically induced     TOTAL HIP ARTHROPLASTY  2018    Hip Replacement          Social History:   reports that she has quit smoking. Her smoking use included cigarettes. She has never used smokeless tobacco. She reports that she does not currently use alcohol. She reports that she does not use drugs.      Allergies:  Allergies   Allergen Reactions    Ciprofloxacin Hcl Itching       Outpatient Medications:  Current Outpatient Medications   Medication Instructions    albuterol 90 mcg/actuation inhaler 1-2 puffs, inhalation, Every 4 hours PRN    allopurinol (ZYLOPRIM) 300 mg, oral, Daily    amLODIPine (NORVASC) 2.5 mg, oral, Daily    aspirin 81 mg, oral, Daily    atorvastatin (LIPITOR) 80 mg, oral, Daily    azelastine (Astelin) 137 mcg (0.1 %) nasal spray 1 spray, Each Nostril, 2 times daily    blood-glucose meter (OneTouch Ultra2 Meter) misc Use to check glucose levels twice a day    clotrimazole-betamethasone (Lotrisone) cream 1 Application, Topical, 2 times daily    dorzolamide (Trusopt) 2 % ophthalmic solution No dose, route, or frequency recorded.    fluticasone-umeclidin-vilanter (Trelegy Ellipta) 200-62.5-25 mcg blister with device 1 puff, inhalation, Daily    ipratropium-albuteroL (Duo-Neb) 0.5-2.5 mg/3 mL nebulizer solution 3 mL, nebulization, Every 6 hours PRN    lancets (OneTouch Delica Plus Lancet) 30 gauge misc 2 Lancets, miscellaneous, Daily    meclizine (ANTIVERT) 25 mg, oral, 3 times daily PRN    metFORMIN (GLUCOPHAGE) 500 mg, oral, Daily with breakfast    nitroglycerin (NITROSTAT) 0.4 mg, sublingual, Every 5 min PRN, FOR UP TO 3 DOSES AS NEEDED FOR CHEST PAIN.CALL 911 IF PAIN PERSISTS.    OneTouch Ultra Test strip 2 strips, miscellaneous, Daily    triamcinolone (Kenalog) 0.1 % cream Topical, 2 times daily, APPLY AND RUB IN A THIN FILM TO  "AFFECTED AREAS TWICE DAILY.(AM AND PM).<BR>       ROS     Physical Exam:  Vitals:    08/21/24 1554   BP: 107/72   BP Location: Right arm   Patient Position: Sitting   BP Cuff Size: Large adult   Pulse: 109   SpO2: 92%   Weight: 102 kg (224 lb 3 oz)   Height: 1.664 m (5' 5.5\")     Wt Readings from Last 5 Encounters:   08/21/24 102 kg (224 lb 3 oz)   08/06/24 102 kg (224 lb)   07/22/24 102 kg (224 lb 6.9 oz)   07/11/24 104 kg (229 lb 4.5 oz)   05/30/24 104 kg (230 lb)     Body mass index is 36.74 kg/m².   Well-developed female in no acute distress.  Flat JVP.  Normal carotid upstrokes.  Regular rhythm without gallop or murmur.  No reproducible chest pain with palpation.  No rubs.  Clear lungs.  Soft abdomen.  No dependent edema with intact pedal pulses     Last Labs:  CMP:  Recent Labs     05/29/24  1531 05/16/24  1506 05/14/24  1043 04/04/24  1003 03/05/24  0110    140 139 143 137   K 4.6 4.3 4.5 4.1 4.7    108* 106 104 102   CO2 26 24 22 29 24   ANIONGAP 15 12 16 14 16   BUN 22 25* 16 19 23   CREATININE 1.22* 1.39* 1.28* 1.26* 1.21*   EGFR 49* 42* 47* 47* 50*   GLUCOSE 74 105* 107* 117* 195*     Recent Labs     05/29/24  1531 05/16/24  1506 05/14/24  1043 04/04/24  1003 03/05/24  0110 05/04/22  1229 04/27/22  1711 02/06/22  0700 02/05/22  1121   ALBUMIN 4.2 3.8 4.4 4.0 3.8   < > 4.3   < > 3.5   ALKPHOS 88 80 87 104 89   < > 98   < > 87   ALT 16 10 14 18 20   < > 20   < > 10   AST 18 13 14 17 16   < > 17   < > 31   BILITOT 0.6 0.8 0.9 0.8 0.5   < > 2.0*   < > 0.7   LIPASE  --  23 17  --   --   --  16  --  80    < > = values in this interval not displayed.     CBC:  Recent Labs     05/29/24  1531 05/16/24  1506 05/14/24  1043 04/04/24  1003 03/05/24  0110   WBC 10.1 8.9 9.9 9.1 13.3*   HGB 13.7 13.5 15.0 14.6 13.7   HCT 44.3 41.9 46.1* 45.3 43.4    289 266 355 339   MCV 92 89 89 90 92     COAG:   Recent Labs     03/05/24  0110 11/28/22  1505 02/06/22  0700 02/05/22  1121 02/05/22  0020 " 12/17/20  1045 09/17/18  0949 01/26/18  1452 10/19/17  1230   INR  --   --  1.0 1.2* 1.0 1.1 1.0   < >  --    DDIMERVTE 344 495  --   --   --   --   --   --  562*    < > = values in this interval not displayed.     HEME/ENDO:  Recent Labs     07/22/24  1445 04/04/24  1003 04/04/24  0952 10/26/23  1141 02/23/23  1634 02/05/22  1121 09/14/21  1122 06/09/20  1141 12/16/19  1635   TSH  --   --   --   --  1.41  --  1.22  --  1.00   HGBA1C 6.1 6.6* 6.8* 6.5*  --    < > 6.3*   < >  --     < > = values in this interval not displayed.      CARDIAC:   Recent Labs     05/14/24  1043 03/05/24  0411 03/05/24  0110 11/28/22  1541 11/28/22  1505 11/17/22  1520 11/17/22  1443 11/17/22  1443 09/30/21  1153   TROPHS 3 8  --  3 3 7   < > 5  --    BNP  --   --  17  --  27  --   --  21 17    < > = values in this interval not displayed.     Recent Labs     04/04/24  1003 10/26/23  1141 05/04/22  1229 02/05/22  1121 02/05/22  0600   CHOL 199 222* 137 250* 258*   LDLF  --   --  73 183* 190*   HDL 39.0 41.9 39.6* 34.2* 38.5*   TRIG 100 119 122 165* 147       Last Cardiology Tests:  ECG:      Echo:  Echo Results:  Transthoracic Echo (TTE) Complete 01/24/2024    [facility], [address] [place] [address]  Tel [phone] and Fax [phone]    TRANSTHORACIC ECHOCARDIOGRAM REPORT      Patient Name:     SARA TAMIR Barrientos Physician:   Herlinda Llamas MD  Study Date:       1/24/2024          Ordering Provider:   Herlinda LLAMAS  MRN/PID:          [id number]           Fellow:  Accession#:       [id number]       Nurse:  Date of           1960 / 63      Sonographer:         Cody Brambila RDCS  Birth/Age:        years  Gender:           F                  Additional Staff:  Height:           167.64 cm          Admit Date:  Weight:           104.33 kg          Admission Status:    Outpatient  BSA:              2.12 m2            Encounter#:          [id number]  Department Location: [facility]  Non  Invasive  Blood Pressure: 138 /68 mmHg    Study Type:    TRANSTHORACIC ECHO (TTE) COMPLETE  Diagnosis/ICD: Nonrheumatic aortic (valve) insufficiency-I35.1  Indication:    AI  CPT Code:      Echo Complete w Full [id number]    Patient History:  Valve Disorders:   Aortic Insufficiency.  Pertinent History: Hyperlipidemia and CAD.    Study Detail: The following Echo studies were performed: 2D, M-Mode, Doppler and  color flow.      PHYSICIAN INTERPRETATION:  Left Ventricle: The left ventricular systolic function is normal, with an estimated ejection fraction of 55-60%. There are no regional wall motion abnormalities. The left ventricular cavity size is normal. There is left ventricular concentric remodeling. Spectral Doppler shows a normal pattern of left ventricular diastolic filling.  Left Atrium: The left atrium is normal in size.  Right Ventricle: The right ventricle is normal in size. There is normal right ventricular global systolic function.  Right Atrium: The right atrium is normal in size.  Aortic Valve: The aortic valve is probably trileaflet. There is mild to moderate aortic valve regurgitation. The peak instantaneous gradient of the aortic valve is 8.3 mmHg. The mean gradient of the aortic valve is 4.0 mmHg.  Mitral Valve: The mitral valve is normal in structure. There is trace mitral valve regurgitation.  Tricuspid Valve: The tricuspid valve is structurally normal. There is trace tricuspid regurgitation. The Doppler estimated RVSP is within normal limits at 30.2 mmHg.  Pulmonic Valve: The pulmonic valve is not well visualized. There is physiologic pulmonic valve regurgitation.  Pericardium: There is a trivial pericardial effusion.  Aorta: The aortic root is normal.  In comparison to the previous echocardiogram(s): Compared with study from 2/5/2022, no significant change.      CONCLUSIONS:  1. Left ventricular systolic function is normal with a 55-60% estimated ejection fraction.  2. RVSP within normal  limits.  3. Mild to moderate aortic valve regurgitation.    QUANTITATIVE DATA SUMMARY:  2D MEASUREMENTS:  Normal Ranges:  LAs:           3.20 cm   (2.7-4.0cm)  IVSd:          1.20 cm   (0.6-1.1cm)  LVPWd:         1.20 cm   (0.6-1.1cm)  LVIDd:         3.80 cm   (3.9-5.9cm)  LVIDs:         2.90 cm  LV Mass Index: 72.2 g/m2  LV % FS        23.7 %    LA VOLUME:  Normal Ranges:  LA Vol A4C:        68.6 ml    (22+/-6mL/m2)  LA Vol A2C:        116.0 ml  LA Vol BP:         90.6 ml  LA Vol Index A4C:  32.3ml/m2  LA Vol Index A2C:  54.6 ml/m2  LA Vol Index BP:   42.7 ml/m2  LA Area A4C:       19.9 cm2  LA Area A2C:       26.3 cm2  LA Major Axis A4C: 4.9 cm  LA Major Axis A2C: 5.1 cm  LA Volume Index:   42.7 ml/m2    AORTA MEASUREMENTS:  Normal Ranges:  Asc Ao, d: 3.30 cm (2.1-3.4cm)    LV SYSTOLIC FUNCTION BY 2D PLANIMETRY (MOD):  Normal Ranges:  EF-A4C View: 40.5 % (>=55%)  EF-A2C View: 58.7 %  EF-Biplane:  50.1 %    LV DIASTOLIC FUNCTION:  Normal Ranges:  MV Peak E:        0.53 m/s    (0.7-1.2 m/s)  MV Peak A:        0.87 m/s    (0.42-0.7 m/s)  E/A Ratio:        0.61        (1.0-2.2)  MV lateral e'     0.06 m/s  MV medial e'      0.05 m/s  MV A Dur:         164.00 msec  E/e' Ratio:       9.00        (<8.0)  PulmV Sys Efrain:    55.90 cm/s  PulmV Finnegan Efrain:   39.10 cm/s  PulmV S/D Efrain:    1.40  PulmV A Revs Efrain: 33.60 cm/s  PulmV A Revs Dur: 100.00 msec    MITRAL VALVE:  Normal Ranges:  MV Vmax:    0.87 m/s (<=1.3m/s)  MV peak PG: 3.0 mmHg (<5mmHg)  MV mean P.0 mmHg (<2mmHg)  MV DT:      317 msec (150-240msec)    AORTIC VALVE:  Normal Ranges:  AoV Vmax:                1.44 m/s (<=1.7m/s)  AoV Peak P.3 mmHg (<20mmHg)  AoV Mean P.0 mmHg (1.7-11.5mmHg)  LVOT Max Efrain:            0.86 m/s (<=1.1m/s)  AoV VTI:                 29.00 cm (18-25cm)  LVOT VTI:                20.00 cm  AoV Dimensionless Index: 0.69    AORTIC INSUFFICIENCY:  AI Vmax:       2.84 m/s  AI Half-time:  392 msec  AI Decel Rate:  212.00 cm/s2      RIGHT VENTRICLE:  RV Basal 3.81 cm  RV Mid   3.26 cm  RV Major 6.9 cm  TAPSE:   16.5 mm  RV s'    0.12 m/s    TRICUSPID VALVE/RVSP:  Normal Ranges:  Peak TR Velocity: 2.61 m/s  RV Syst Pressure: 30.2 mmHg (< 30mmHg)    PULMONIC VALVE:  Normal Ranges:  PV Accel Time: 156 msec (>120ms)  PV Max Efrain:    0.9 m/s  (0.6-0.9m/s)  PV Max PG:     3.4 mmHg    Pulmonary Veins:  PulmV A Revs Dur: 100.00 msec  PulmV A Revs Efrain: 33.60 cm/s  PulmV Finnegan Efrain:   39.10 cm/s  PulmV S/D Efrain:    1.40  PulmV Sys Efrain:    55.90 cm/s      [id number] Stephen Ceja MD  Electronically signed on 1/25/2024 at 9:46:42 AM        ** Final **       Cath:      Stress Test:  Stress Results:  No results found for this or any previous visit from the past 365 days.         Cardiac Imaging:  MR IAC w and wo IV contrast  Narrative: Interpreted By:  Travis Garcia,   STUDY:  MR IAC W AND WO IV CONTRAST;  8/17/2024 9:59 am      INDICATION:  Signs/Symptoms:Asymmetric sensorineural hearing loss on the left side.      COMPARISON:  None.      ACCESSION NUMBER(S):  [id number]      ORDERING CLINICIAN:  TERESA SALGADO      TECHNIQUE:  Axial diffusion, axial flair, axial T2, axial gradient echo T2, and  sagittal T1-weighted image of the brain were obtained prior to  intravenous contrast administration.  Following intravenous contrast  administration, volumetric T1-weighted images of the brain were  obtained.  In addition, high-resolution axial T2, pre- and  postgadolinium high resolution axial T1-weighted images as well as  post gadolinium high-resolution coronal T1-weighted images through  the internal auditory canals were obtained.  The patient received  20mL of Dotarem gadolinium intravenously.      FINDINGS:  The diffusion weighted images fail to demonstrate abnormal diffusion  restriction to suggest acute infarction.      The ventricular system is nondilated.      There are scattered nonspecific white matter changes noted within the  cerebral  hemispheres bilaterally which while nonspecific, given the  patient's age, likely represent sequelae of more remote small-vessel  ischemic change.      Small foci of encephalomalacia are noted within the cerebellar  hemispheres bilaterally.      There is flattening of the pituitary gland along the floor of the  sella compatible with an empty sella syndrome.      The gradient echo T2 images demonstrate a couple of punctate foci of  blooming dark signal within the right cerebellar hemisphere  suggesting small foci of previous microhemorrhage and/or dystrophic  calcification/mineralization.      There is no abnormal enhancement identified on the postcontrast  images.  Specifically, no abnormal mass lesion or abnormal  enhancement is noted on the high-resolution images through the  posterior fossa.      There is a small retention cyst or polyp noted within the posterior  right maxillary sinus. Minimal mucosal thickening is noted within the  maxillary sinuses and scattered ethmoid air cells.      The mastoid air cells are clear.      Impression: There are scattered nonspecific white matter changes noted within the  cerebral hemispheres bilaterally which while nonspecific, given the  patient's age, likely represent sequelae of more remote small-vessel  ischemic change.      Small foci of encephalomalacia are noted within the cerebellar  hemispheres bilaterally.      There is flattening of the pituitary gland along the floor of the  sella compatible with an empty sella syndrome.      The gradient echo T2 weighted images demonstrate a couple of punctate  foci of blooming dark signal within the right cerebellar hemisphere  suggesting small foci of previous microhemorrhage and/or dystrophic  calcification/mineralization.              MACRO:  None.      Signed by: Travis Garcia 8/19/2024 12:59 PM  Dictation workstation:   [id number]        Assessment/Plan   She was added on today in urgent office evaluation for the recent  development of nonexertional chest discomfort quality and location could be consistent with an ischemic substrate although my clinical suspicion is that is not likely.  ECG while having pain showed no acute change.  There was no reproducible chest discomfort with palpation.  Her cardiac exam seems benign to me today.  I have ordered a stat high-sensitivity troponin and BNP and asked her to schedule a pharmacologic stress test just as a precaution with an office follow-up to discuss results.        Orders:  No orders of the defined types were placed in this encounter.     Followup Appts:  Future Appointments   Date Time Provider Department Center   9/5/2024 10:15 AM Laura Armando MD [id number] [place]   11/21/2024  9:00 AM Hunter Gong MD [id number] [facility]   12/2/2024 10:00 AM [facility] CT 1 [facility]   1/23/2025 10:00 AM Kristina Tellez MD [id number] [facility]   7/23/2025 10:00 AM Kristina Tellez MD [id number] [facility]           ____________________________________________________________  Stephen Ceja MD    Senior Attending Physician  [facility]  [facility]

## 2024-08-21 NOTE — PATIENT INSTRUCTIONS
Thank you for coming to your cardiology visit today.  I am not sure what your chest pain relates to.  Your electrocardiogram today showed no changes from prior.  Your cardiac exam was likewise normal.  I would like you to go to the lab and get 2 blood tests a high-sensitivity troponin and a brain natruretic peptide .  These tests are helpful to exclude any evidence of recent heart muscle damage such as a recent heart attack.  Please call [phone] [facility] to schedule a medical stress test.  It will be important that day to not have eaten breakfast and to avoid caffeine or decaffeinated products for 24 hours prior to the test.  My 's number is [phone], her name is Cony.  If you have any questions call Cony or message me through the medical record.  Please make a follow-up appointment at your convenience in the next couple of weeks to discuss the results of testing and to see if your chest pain is still occurring

## 2024-08-22 NOTE — RESULT ENCOUNTER NOTE
Just an FYI, this lady presented to the office with prolonged chest pain I ordered a troponin and a BNP both were normal I left a voicemail with her telling her this information she is scheduled for follow-up nuclear stress test

## 2024-08-23 LAB
ATRIAL RATE: 104 BPM
P AXIS: 79 DEGREES
P OFFSET: 180 MS
P ONSET: 131 MS
PR INTERVAL: 172 MS
Q ONSET: 217 MS
QRS COUNT: 17 BEATS
QRS DURATION: 76 MS
QT INTERVAL: 356 MS
QTC CALCULATION(BAZETT): 468 MS
QTC FREDERICIA: 427 MS
R AXIS: -84 DEGREES
T AXIS: 74 DEGREES
T OFFSET: 395 MS
VENTRICULAR RATE: 104 BPM

## 2024-09-03 ENCOUNTER — DOCUMENTATION (OUTPATIENT)
Dept: CARDIOLOGY | Facility: CLINIC | Age: 64
End: 2024-09-03
Payer: COMMERCIAL

## 2024-09-03 NOTE — PROGRESS NOTES
I returned a call from August 27 regarding chest pain.  She continues to have intermittent spasmodic chest wall pain unrelenting unrelated to physical activity.  She was complaining of similar symptoms when I saw her in late August I ordered a troponin and BNP both were negative she had no acute EKG changes benign cardiac exam with no rub.  I continue to believe this chest pain is most likely musculoskeletal she is very worried that this could be cardiac   I advised her to seek care through the emergency room.   I asked her to follow-up with the scheduled stress test

## 2024-09-05 ENCOUNTER — APPOINTMENT (OUTPATIENT)
Dept: OTOLARYNGOLOGY | Facility: CLINIC | Age: 64
End: 2024-09-05
Payer: COMMERCIAL

## 2024-09-05 VITALS — WEIGHT: 224 LBS | BODY MASS INDEX: 36 KG/M2 | TEMPERATURE: 98.1 F | HEIGHT: 66 IN

## 2024-09-05 DIAGNOSIS — G47.33 OBSTRUCTIVE SLEEP APNEA: ICD-10-CM

## 2024-09-05 DIAGNOSIS — E23.6 EMPTY SELLA SYNDROME (MULTI): ICD-10-CM

## 2024-09-05 DIAGNOSIS — H90.3 ASNHL (ASYMMETRICAL SENSORINEURAL HEARING LOSS): Primary | ICD-10-CM

## 2024-09-05 DIAGNOSIS — H90.42 SENSORINEURAL HEARING LOSS (SNHL) OF LEFT EAR WITH UNRESTRICTED HEARING OF RIGHT EAR: ICD-10-CM

## 2024-09-05 DIAGNOSIS — R42 DIZZINESS: ICD-10-CM

## 2024-09-05 PROCEDURE — 3044F HG A1C LEVEL LT 7.0%: CPT | Performed by: OTOLARYNGOLOGY

## 2024-09-05 PROCEDURE — 1159F MED LIST DOCD IN RCRD: CPT | Performed by: OTOLARYNGOLOGY

## 2024-09-05 PROCEDURE — 1036F TOBACCO NON-USER: CPT | Performed by: OTOLARYNGOLOGY

## 2024-09-05 PROCEDURE — 3008F BODY MASS INDEX DOCD: CPT | Performed by: OTOLARYNGOLOGY

## 2024-09-05 PROCEDURE — 1160F RVW MEDS BY RX/DR IN RCRD: CPT | Performed by: OTOLARYNGOLOGY

## 2024-09-05 PROCEDURE — 99213 OFFICE O/P EST LOW 20 MIN: CPT | Performed by: OTOLARYNGOLOGY

## 2024-09-05 PROCEDURE — 3050F LDL-C >= 130 MG/DL: CPT | Performed by: OTOLARYNGOLOGY

## 2024-09-05 NOTE — PROGRESS NOTES
Subjective   Patient ID: Isabelle Abel is a 64 y.o. female  HPI  Patient presents for follow-up for history of dizziness and asymmetric sensorineural hearing loss on the left side.  She has not had any dizziness recently.  She has not noticed any change in her hearing.  She has no otalgia and no otorrhea.  Review of Systems    Objective   Physical Exam  The ear canals and the tympanic membranes are clear and mobile.  There is no spontaneous nystagmus noted.  The MRI of the brain and IACs was reviewed and there is evidence of encephalomalacia.  There is also empty sella turcica noted.    Assessment/Plan   Diagnoses and all orders for this visit:  ASNHL (asymmetrical sensorineural hearing loss) (Primary)  -     Comprehensive hearing test; Future  Dizziness  Sensorineural hearing loss (SNHL) of left ear with unrestricted hearing of right ear  -     Comprehensive hearing test; Future  Obstructive sleep apnea  Empty sella syndrome (Multi)  -     Referral to Endocrinology; Future     1.  Mild asymmetric sensorineural hearing loss on the right side with no intracranial lesion noted on the MRI of the brain and IACs.  The patient was scheduled for a repeat hearing test in 6 months.  2.  History of dizziness and imbalance resolved after reduction of blood pressure medications most likely secondary to orthostasis with no recent dizziness noted.  3.  Incidentally noted empty sella surgery.  The patient was referred to endocrinology to rule out any pituitary hormone imbalances.

## 2024-09-11 ENCOUNTER — HOSPITAL ENCOUNTER (OUTPATIENT)
Dept: CARDIOLOGY | Facility: CLINIC | Age: 64
Discharge: HOME | End: 2024-09-11
Payer: MEDICARE

## 2024-09-11 ENCOUNTER — HOSPITAL ENCOUNTER (OUTPATIENT)
Dept: RADIOLOGY | Facility: CLINIC | Age: 64
Discharge: HOME | End: 2024-09-11
Payer: MEDICARE

## 2024-09-11 DIAGNOSIS — R07.9 CHEST PAIN, UNSPECIFIED TYPE: ICD-10-CM

## 2024-09-11 DIAGNOSIS — R07.9 CHEST PAIN AT REST: ICD-10-CM

## 2024-09-11 PROBLEM — M25.551 RIGHT HIP PAIN: Status: ACTIVE | Noted: 2024-09-11

## 2024-09-11 PROBLEM — M79.671 RIGHT FOOT PAIN: Status: ACTIVE | Noted: 2024-09-11

## 2024-09-11 PROCEDURE — 93018 CV STRESS TEST I&R ONLY: CPT | Performed by: INTERNAL MEDICINE

## 2024-09-11 PROCEDURE — 78452 HT MUSCLE IMAGE SPECT MULT: CPT | Performed by: NUCLEAR MEDICINE

## 2024-09-11 PROCEDURE — 2500000004 HC RX 250 GENERAL PHARMACY W/ HCPCS (ALT 636 FOR OP/ED): Performed by: INTERNAL MEDICINE

## 2024-09-11 PROCEDURE — 3430000001 HC RX 343 DIAGNOSTIC RADIOPHARMACEUTICALS: Performed by: INTERNAL MEDICINE

## 2024-09-11 PROCEDURE — A9502 TC99M TETROFOSMIN: HCPCS | Performed by: INTERNAL MEDICINE

## 2024-09-11 PROCEDURE — 78452 HT MUSCLE IMAGE SPECT MULT: CPT

## 2024-09-11 PROCEDURE — 93016 CV STRESS TEST SUPVJ ONLY: CPT | Performed by: INTERNAL MEDICINE

## 2024-09-11 PROCEDURE — 93017 CV STRESS TEST TRACING ONLY: CPT

## 2024-09-11 RX ORDER — REGADENOSON 0.08 MG/ML
0.4 INJECTION, SOLUTION INTRAVENOUS
Status: COMPLETED | OUTPATIENT
Start: 2024-09-11 | End: 2024-09-11

## 2024-09-12 ENCOUNTER — APPOINTMENT (OUTPATIENT)
Dept: PRIMARY CARE | Facility: CLINIC | Age: 64
End: 2024-09-12
Payer: COMMERCIAL

## 2024-09-13 ENCOUNTER — APPOINTMENT (OUTPATIENT)
Dept: RADIOLOGY | Facility: HOSPITAL | Age: 64
End: 2024-09-13
Payer: COMMERCIAL

## 2024-09-13 ENCOUNTER — HOSPITAL ENCOUNTER (OUTPATIENT)
Dept: RADIOLOGY | Facility: HOSPITAL | Age: 64
Discharge: HOME | End: 2024-09-13
Payer: COMMERCIAL

## 2024-09-13 ENCOUNTER — OFFICE VISIT (OUTPATIENT)
Dept: ORTHOPEDIC SURGERY | Facility: HOSPITAL | Age: 64
End: 2024-09-13
Payer: COMMERCIAL

## 2024-09-13 DIAGNOSIS — M72.2 PLANTAR FASCIITIS OF LEFT FOOT: Primary | ICD-10-CM

## 2024-09-13 DIAGNOSIS — M25.551 RIGHT HIP PAIN: ICD-10-CM

## 2024-09-13 DIAGNOSIS — M79.671 RIGHT FOOT PAIN: ICD-10-CM

## 2024-09-13 DIAGNOSIS — M72.2 PLANTAR FASCIITIS, RIGHT: ICD-10-CM

## 2024-09-13 PROCEDURE — 1159F MED LIST DOCD IN RCRD: CPT | Performed by: PHYSICIAN ASSISTANT

## 2024-09-13 PROCEDURE — 73502 X-RAY EXAM HIP UNI 2-3 VIEWS: CPT | Mod: RT

## 2024-09-13 PROCEDURE — 1160F RVW MEDS BY RX/DR IN RCRD: CPT | Performed by: PHYSICIAN ASSISTANT

## 2024-09-13 PROCEDURE — 1036F TOBACCO NON-USER: CPT | Performed by: PHYSICIAN ASSISTANT

## 2024-09-13 PROCEDURE — 99213 OFFICE O/P EST LOW 20 MIN: CPT | Performed by: PHYSICIAN ASSISTANT

## 2024-09-13 PROCEDURE — 3044F HG A1C LEVEL LT 7.0%: CPT | Performed by: PHYSICIAN ASSISTANT

## 2024-09-13 PROCEDURE — 3050F LDL-C >= 130 MG/DL: CPT | Performed by: PHYSICIAN ASSISTANT

## 2024-09-13 PROCEDURE — 1125F AMNT PAIN NOTED PAIN PRSNT: CPT | Performed by: PHYSICIAN ASSISTANT

## 2024-09-13 PROCEDURE — 99203 OFFICE O/P NEW LOW 30 MIN: CPT | Performed by: PHYSICIAN ASSISTANT

## 2024-09-13 PROCEDURE — 73630 X-RAY EXAM OF FOOT: CPT | Mod: LT

## 2024-09-13 RX ORDER — MELOXICAM 7.5 MG/1
TABLET ORAL
COMMUNITY
Start: 2024-07-17

## 2024-09-13 ASSESSMENT — PAIN DESCRIPTION - DESCRIPTORS: DESCRIPTORS: SHOOTING

## 2024-09-13 ASSESSMENT — PAIN - FUNCTIONAL ASSESSMENT: PAIN_FUNCTIONAL_ASSESSMENT: 0-10

## 2024-09-13 ASSESSMENT — PAIN SCALES - GENERAL: PAINLEVEL_OUTOF10: 7

## 2024-09-13 NOTE — PROGRESS NOTES
Subjective    Patient ID: Isabelle Abel is a 64 y.o. female.    Chief Complaint: Pain of the Right Hip and Pain of the Left Foot           HPI:  Isabelle Abel is a 64 y.o. female who presents today for evaluation of left foot and right thigh pain.  She states that she has been having pain along the plantar aspect of her right foot for the last several months.  No specific injury or trauma that she could recall.  Symptoms are worse with activity.  The first couple steps out of bed in the morning are some of the worst.  When asked about medication use for her pain she states that nothing seems to work.  She has used some shoe inserts which helped partially.  She also complains of right hip and thigh pain.  She indicates pain that is mainly in the midportion of her thigh.  She does have a history of right hip arthroplasty performed by Dr. Jiang.    ROS  Constitutional: No fever, no chills, not feeling tired, no recent weight gain and no recent weight loss  ENT: No nosebleeds  Cardiovascular: No chest pain  Respiratory: No shortness of breath and no cough  Gastrointestinal: No abdominal pain, no nausea, no diarrhea, and no vomiting  Musculoskeletal: No arthralgias  Integumentary: No rashes and no skin lesions  Neurological: No headache  Psychiatric: No sleep disturbances no depression  Endocrine: No muscle weakness and no muscle cramps  Hematologic/lymphatic: No swelling glands and no tendency for easy bruising    Past Medical History:   Diagnosis Date    Asthma ([facility])     Encounter for follow-up examination after completed treatment for conditions other than malignant neoplasm 05/20/2021    Hospital discharge follow-up    Encounter for gynecological examination (general) (routine) without abnormal findings 03/11/2019    Encounter for annual routine gynecological examination    Hypertension     Non-ST elevation (NSTEMI) myocardial infarction (Multi) 02/28/2022    Non-ST elevation myocardial infarction (NSTEMI)  in recovery phase    Personal history of other diseases of the respiratory system 2018    History of chronic obstructive lung disease    Zoster without complications 10/14/2020    Herpes zoster without complication        Past Surgical History:   Procedure Laterality Date    BREAST BIOPSY      OTHER SURGICAL HISTORY  04/10/2014    Uterine Surgery    OTHER SURGICAL HISTORY  2019    Dilation and curettage    OTHER SURGICAL HISTORY  2019    Surgically induced     TOTAL HIP ARTHROPLASTY  2018    Hip Replacement          Current Outpatient Medications:     albuterol 90 mcg/actuation inhaler, Inhale 1-2 puffs every 4 hours if needed for shortness of breath or wheezing., Disp: 18 g, Rfl: 1    allopurinol (Zyloprim) 300 mg tablet, Take 1 tablet (300 mg) by mouth once daily., Disp: 90 tablet, Rfl: 1    amLODIPine (Norvasc) 2.5 mg tablet, Take 1 tablet (2.5 mg) by mouth once daily., Disp: 90 tablet, Rfl: 1    aspirin 81 mg EC tablet, TAKE ONE TABLET BY MOUTH DAILY, Disp: 90 tablet, Rfl: 0    atorvastatin (Lipitor) 80 mg tablet, take one tablet by mouth daily, Disp: 90 tablet, Rfl: 0    blood-glucose meter (OneTouch Ultra2 Meter) misc, Use to check glucose levels twice a day, Disp: 1 each, Rfl: 0    clotrimazole-betamethasone (Lotrisone) cream, Apply 1 Application topically 2 times a day., Disp: , Rfl:     dorzolamide (Trusopt) 2 % ophthalmic solution, , Disp: , Rfl:     fluticasone-umeclidin-vilanter (Trelegy Ellipta) 200-62.5-25 mcg blister with device, Inhale 1 puff once daily., Disp: 30 each, Rfl: 11    ipratropium-albuteroL (Duo-Neb) 0.5-2.5 mg/3 mL nebulizer solution, Take 3 mL by nebulization every 6 hours if needed., Disp: , Rfl:     lancets (OneTouch Delica Plus Lancet) 30 gauge misc, 2 Lancets once daily., Disp: 200 each, Rfl: 3    meclizine (Antivert) 25 mg tablet, Take 1 tablet (25 mg) by mouth 3 times a day as needed for dizziness., Disp: 30 tablet, Rfl: 11    meloxicam (Mobic) 7.5  mg tablet, Take 1 tablet by mouth every 12 hours as needed for pain control, Disp: , Rfl:     metFORMIN (Glucophage) 500 mg tablet, TAKE ONE TABLET BY MOUTH EVERY DAY with breakfast, Disp: 90 tablet, Rfl: 1    nitroglycerin (Nitrostat) 0.4 mg SL tablet, Place 1 tablet (0.4 mg) under the tongue every 5 minutes if needed. FOR UP TO 3 DOSES AS NEEDED FOR CHEST PAIN.CALL 911 IF PAIN PERSISTS., Disp: , Rfl:     OneTouch Ultra Test strip, 2 strips once daily., Disp: 200 strip, Rfl: 3    triamcinolone (Kenalog) 0.1 % cream, Apply topically twice a day. APPLY AND RUB IN A THIN FILM TO AFFECTED AREAS TWICE DAILY.(AM AND PM)., Disp: , Rfl:     azelastine (Astelin) 137 mcg (0.1 %) nasal spray, Administer 1 spray into each nostril 2 times a day., Disp: 30 mL, Rfl: 0     Allergies   Allergen Reactions    Ciprofloxacin Hcl Itching        Social Connections: Not on File (7/22/2020)    Received from [facility]    Social Connections     Social Connections and Isolation: 0          Objective   64-year-old female well appearing in no acute distress. Alert and oriented ×3.  Skin intact bilateral lower extremities.   Using a cane.  Coordination and balance intact.  Bilateral lower extremity compartments supple.  5 out of 5 distal motor strength bilaterally.  L4 through S1 sensation intact bilaterally.  2+ DP/PT pulses bilaterally.  Both feet have pes planus.  The left foot is tender palpation directly over the plantar aspect of calcaneus.  No tenderness over the posterior aspect of the calcaneus or along the course of the Achilles tendon.    Image Results:  X-rays of the left foot taken today in the office were reviewed.  These are negative for fracture or dislocation.  Calcaneal spur noted best appreciated on the lateral view.  Bilateral hip x-rays showed arthroplasty components in place with no loosening or fracture.      Assessment/Plan   Encounter Diagnoses:  Left plantar fasciitis    Orders Placed This Encounter    XR hip right  with pelvis when performed 2 or 3 views    Referral to Physical Therapy       For her foot pain I recommended that she try to wear tennis shoes as often as possible.  She can continue with her shoe inserts.  She should try to stretch her plantar fascia before performing any type of walking or when getting out of bed.  At home she can perform ice massage.  She is also referred to physical therapy.  She can use topical diclofenac 3-4 times a day as well.  If not seeing any improvement in 3 to 4 weeks she can follow-up with Dr. Cox.   Given her history of arthroplasty in both hips and her current complaints I recommend follow-up with Dr. Jiang.  She verbalized understanding and agreed to the plan.    This office note was dictated using Dragon voice to text software and was not proofread for spelling or grammatical errors

## 2024-09-16 NOTE — ASSESSMENT & PLAN NOTE
[facility] APPOINTMENT CONFIRMATION      Date: 9-    Time: 8:30 am    Provider name: Dr. Shayy Coleman    Location: Neurology Locations: [address] [place] [address] [address] [facility] [address]    Zoom link sent (if applicable): N/A    Will patient be in [place] for the virtual visit? N/A    Is patient currently in a facility? No    Alternative contact information: NA    Appointment confirmed: Yes confirmed with patient's wife    \"Please ensure you bring the medication bottles, including the dates and times you take each medication.\"  New Patients: \"Please bring any outside records or images.\"    \"We do have free parking available in the main hospital parking lot. However, parking can be difficult to find so we ask that you arrive 40 minutes prior to your appointment.\"       Hemoglobin A1c done in the office was 6.8 up from 6.5.  Advised to follow diabetic diet, avoid sweets rice potatoes pasta sweet drinks.  Start metformin 500 mg with dinner.

## 2024-09-18 ENCOUNTER — APPOINTMENT (OUTPATIENT)
Dept: ORTHOPEDIC SURGERY | Facility: HOSPITAL | Age: 64
End: 2024-09-18
Payer: MEDICARE

## 2024-09-23 ENCOUNTER — OFFICE VISIT (OUTPATIENT)
Dept: CARDIOLOGY | Facility: CLINIC | Age: 64
End: 2024-09-23
Payer: COMMERCIAL

## 2024-09-23 VITALS
SYSTOLIC BLOOD PRESSURE: 97 MMHG | BODY MASS INDEX: 35.88 KG/M2 | HEART RATE: 88 BPM | WEIGHT: 223.25 LBS | DIASTOLIC BLOOD PRESSURE: 68 MMHG | OXYGEN SATURATION: 94 % | HEIGHT: 66 IN

## 2024-09-23 DIAGNOSIS — I25.10 ATHEROSCLEROSIS OF NATIVE CORONARY ARTERY OF NATIVE HEART WITHOUT ANGINA PECTORIS: Primary | ICD-10-CM

## 2024-09-23 PROCEDURE — 3074F SYST BP LT 130 MM HG: CPT | Performed by: INTERNAL MEDICINE

## 2024-09-23 PROCEDURE — 1160F RVW MEDS BY RX/DR IN RCRD: CPT | Performed by: INTERNAL MEDICINE

## 2024-09-23 PROCEDURE — 1159F MED LIST DOCD IN RCRD: CPT | Performed by: INTERNAL MEDICINE

## 2024-09-23 PROCEDURE — 99214 OFFICE O/P EST MOD 30 MIN: CPT | Performed by: INTERNAL MEDICINE

## 2024-09-23 PROCEDURE — 1036F TOBACCO NON-USER: CPT | Performed by: INTERNAL MEDICINE

## 2024-09-23 PROCEDURE — 3008F BODY MASS INDEX DOCD: CPT | Performed by: INTERNAL MEDICINE

## 2024-09-23 PROCEDURE — 1126F AMNT PAIN NOTED NONE PRSNT: CPT | Performed by: INTERNAL MEDICINE

## 2024-09-23 PROCEDURE — 3078F DIAST BP <80 MM HG: CPT | Performed by: INTERNAL MEDICINE

## 2024-09-23 PROCEDURE — 3044F HG A1C LEVEL LT 7.0%: CPT | Performed by: INTERNAL MEDICINE

## 2024-09-23 PROCEDURE — 3050F LDL-C >= 130 MG/DL: CPT | Performed by: INTERNAL MEDICINE

## 2024-09-23 ASSESSMENT — ENCOUNTER SYMPTOMS: OCCASIONAL FEELINGS OF UNSTEADINESS: 1

## 2024-09-23 ASSESSMENT — COLUMBIA-SUICIDE SEVERITY RATING SCALE - C-SSRS
2. HAVE YOU ACTUALLY HAD ANY THOUGHTS OF KILLING YOURSELF?: NO
1. IN THE PAST MONTH, HAVE YOU WISHED YOU WERE DEAD OR WISHED YOU COULD GO TO SLEEP AND NOT WAKE UP?: NO
6. HAVE YOU EVER DONE ANYTHING, STARTED TO DO ANYTHING, OR PREPARED TO DO ANYTHING TO END YOUR LIFE?: NO

## 2024-09-23 ASSESSMENT — PAIN SCALES - GENERAL: PAINLEVEL: 0-NO PAIN

## 2024-09-23 NOTE — PROGRESS NOTES
Primary Care Physician: Kristina Tellez MD  Date of Visit: 09/23/2024  3:20 PM EDT  Location of visit: [facility] [address]     Chief Complaint:   No chief complaint on file.       HPI / Summary:   Isabelle Abel is a 64 y.o. female presents for followup.     She is being seen in office follow-up evaluation for what I felt was nonexertional possibly musculoskeletal chest pain.  With active chest discomfort she had no acute EKG changes pharmacologic nuclear stress test on September 11 showed normal stress scintigraphy with a EF of 65      Bp has dropped and having dizzy spells may be vertigo.    Had an echo in January had mild-mod AI.    Cathh 2022 mild-moderate CAD, LVEDP 36    Specialty Problems          Cardiology Problems    Atherosclerosis of native coronary artery of native heart without angina pectoris    Benign essential hypertension    Chronic diastolic congestive heart failure (Multi)    Hypercholesterolemia    Hyperlipemia    Mild aortic insufficiency    Nicotine dependence    NSTEMI, initial episode of care (Multi)    Past myocardial infarction    Shortness of breath on exertion        Past Medical History:   Diagnosis Date    Asthma ([facility])     Encounter for follow-up examination after completed treatment for conditions other than malignant neoplasm 05/20/2021    Hospital discharge follow-up    Encounter for gynecological examination (general) (routine) without abnormal findings 03/11/2019    Encounter for annual routine gynecological examination    Hypertension     Non-ST elevation (NSTEMI) myocardial infarction (Multi) 02/28/2022    Non-ST elevation myocardial infarction (NSTEMI) in recovery phase    Personal history of other diseases of the respiratory system 05/19/2018    History of chronic obstructive lung disease    Zoster without complications 10/14/2020    Herpes zoster without complication          Past Surgical History:   Procedure Laterality Date    BREAST BIOPSY      OTHER SURGICAL HISTORY   04/10/2014    Uterine Surgery    OTHER SURGICAL HISTORY  2019    Dilation and curettage    OTHER SURGICAL HISTORY  2019    Surgically induced     TOTAL HIP ARTHROPLASTY  2018    Hip Replacement          Social History:   reports that she has quit smoking. Her smoking use included cigarettes. She has never used smokeless tobacco. She reports that she does not currently use alcohol. She reports that she does not use drugs.      Allergies:  Allergies   Allergen Reactions    Ciprofloxacin Hcl Itching       Outpatient Medications:  Current Outpatient Medications   Medication Instructions    albuterol 90 mcg/actuation inhaler 1-2 puffs, inhalation, Every 4 hours PRN    allopurinol (ZYLOPRIM) 300 mg, oral, Daily    amLODIPine (NORVASC) 2.5 mg, oral, Daily    aspirin 81 mg, oral, Daily    atorvastatin (LIPITOR) 80 mg, oral, Daily    azelastine (Astelin) 137 mcg (0.1 %) nasal spray 1 spray, Each Nostril, 2 times daily    blood-glucose meter (OneTouch Ultra2 Meter) misc Use to check glucose levels twice a day    clotrimazole-betamethasone (Lotrisone) cream 1 Application, Topical, 2 times daily    dorzolamide (Trusopt) 2 % ophthalmic solution No dose, route, or frequency recorded.    fluticasone-umeclidin-vilanter (Trelegy Ellipta) 200-62.5-25 mcg blister with device 1 puff, inhalation, Daily    ipratropium-albuteroL (Duo-Neb) 0.5-2.5 mg/3 mL nebulizer solution 3 mL, nebulization, Every 6 hours PRN    lancets (OneTouch Delica Plus Lancet) 30 gauge misc 2 Lancets, miscellaneous, Daily    meclizine (ANTIVERT) 25 mg, oral, 3 times daily PRN    meloxicam (Mobic) 7.5 mg tablet Take 1 tablet by mouth every 12 hours as needed for pain control    metFORMIN (GLUCOPHAGE) 500 mg, oral, Daily with breakfast    nitroglycerin (NITROSTAT) 0.4 mg, sublingual, Every 5 min PRN, FOR UP TO 3 DOSES AS NEEDED FOR CHEST PAIN.CALL 911 IF PAIN PERSISTS.    OneTouch Ultra Test strip 2 strips, miscellaneous, Daily     triamcinolone (Kenalog) 0.1 % cream Topical, 2 times daily, APPLY AND RUB IN A THIN FILM TO AFFECTED AREAS TWICE DAILY.(AM AND PM).<BR>       ROS     Physical Exam:  There were no vitals filed for this visit.  Wt Readings from Last 5 Encounters:   09/05/24 102 kg (224 lb)   08/21/24 102 kg (224 lb 3 oz)   08/06/24 102 kg (224 lb)   07/22/24 102 kg (224 lb 6.9 oz)   07/11/24 104 kg (229 lb 4.5 oz)     There is no height or weight on file to calculate BMI.     Well-developed female in no acute distress.  Flat JVP.  Normal carotid upstrokes.  Regular rhythm without gallop or murmur.  Clear lungs without crackles.  Soft abdomen without masses.  No dependent edema with intact pedal pulses  Last Labs:  CMP:  Recent Labs     05/29/24  1531 05/16/24  1506 05/14/24  1043 04/04/24  1003 03/05/24  0110    140 139 143 137   K 4.6 4.3 4.5 4.1 4.7    108* 106 104 102   CO2 26 24 22 29 24   ANIONGAP 15 12 16 14 16   BUN 22 25* 16 19 23   CREATININE 1.22* 1.39* 1.28* 1.26* 1.21*   EGFR 49* 42* 47* 47* 50*   GLUCOSE 74 105* 107* 117* 195*     Recent Labs     05/29/24  1531 05/16/24  1506 05/14/24  1043 04/04/24  1003 03/05/24  0110 05/04/22  1229 04/27/22  1711 02/06/22  0700 02/05/22  1121   ALBUMIN 4.2 3.8 4.4 4.0 3.8   < > 4.3   < > 3.5   ALKPHOS 88 80 87 104 89   < > 98   < > 87   ALT 16 10 14 18 20   < > 20   < > 10   AST 18 13 14 17 16   < > 17   < > 31   BILITOT 0.6 0.8 0.9 0.8 0.5   < > 2.0*   < > 0.7   LIPASE  --  23 17  --   --   --  16  --  80    < > = values in this interval not displayed.     CBC:  Recent Labs     05/29/24  1531 05/16/24  1506 05/14/24  1043 04/04/24  1003 03/05/24  0110   WBC 10.1 8.9 9.9 9.1 13.3*   HGB 13.7 13.5 15.0 14.6 13.7   HCT 44.3 41.9 46.1* 45.3 43.4    289 266 355 339   MCV 92 89 89 90 92     COAG:   Recent Labs     03/05/24  0110 11/28/22  1505 02/06/22  0700 02/05/22  1121 02/05/22  0020 12/17/20  1045 09/17/18  0949 01/26/18  1452 10/19/17  1230   INR  --   --  1.0 1.2*  1.0 1.1 1.0   < >  --    DDIMERVTE 344 495  --   --   --   --   --   --  562*    < > = values in this interval not displayed.     HEME/ENDO:  Recent Labs     07/22/24  1445 04/04/24  1003 04/04/24  0952 10/26/23  1141 02/23/23  1634 02/05/22  1121 09/14/21  1122 06/09/20  1141 12/16/19  1635   TSH  --   --   --   --  1.41  --  1.22  --  1.00   HGBA1C 6.1 6.6* 6.8* 6.5*  --    < > 6.3*   < >  --     < > = values in this interval not displayed.      CARDIAC:   Recent Labs     08/21/24  1738 05/14/24  1043 03/05/24  0411 03/05/24  0110 11/28/22  1541 11/28/22  1505 11/17/22  1520 11/17/22  1443 09/30/21  1153   TROPHS 4 3 8  --  3 3   < > 5  --    BNP 17  --   --  17  --  27  --  21 17    < > = values in this interval not displayed.     Recent Labs     04/04/24  1003 10/26/23  1141 05/04/22  1229 02/05/22  1121 02/05/22  0600   CHOL 199 222* 137 250* 258*   LDLF  --   --  73 183* 190*   HDL 39.0 41.9 39.6* 34.2* 38.5*   TRIG 100 119 122 165* 147       Last Cardiology Tests:  ECG:      Echo:  Echo Results:  Transthoracic Echo (TTE) Complete 01/24/2024    [facility], [address] [place] [address]  Tel [phone] and Fax [phone]    TRANSTHORACIC ECHOCARDIOGRAM REPORT      Patient Name:     SARA Barrientos Physician:   Herlinda Llamas MD  Study Date:       1/24/2024          Ordering Provider:   Herlinda LLAMAS  MRN/PID:          [id number]           Fellow:  Accession#:       [id number]       Nurse:  Date of           1960 / 63      Sonographer:         Cody Brambila RDCS  Birth/Age:        years  Gender:           F                  Additional Staff:  Height:           167.64 cm          Admit Date:  Weight:           104.33 kg          Admission Status:    Outpatient  BSA:              2.12 m2            Encounter#:          [id number]  Department Location: [facility] Non  Invasive  Blood Pressure: 138 /68 mmHg    Study Type:    TRANSTHORACIC ECHO (TTE)  COMPLETE  Diagnosis/ICD: Nonrheumatic aortic (valve) insufficiency-I35.1  Indication:    AI  CPT Code:      Echo Complete w Full Doppler-[id number]    Patient History:  Valve Disorders:   Aortic Insufficiency.  Pertinent History: Hyperlipidemia and CAD.    Study Detail: The following Echo studies were performed: 2D, M-Mode, Doppler and  color flow.      PHYSICIAN INTERPRETATION:  Left Ventricle: The left ventricular systolic function is normal, with an estimated ejection fraction of 55-60%. There are no regional wall motion abnormalities. The left ventricular cavity size is normal. There is left ventricular concentric remodeling. Spectral Doppler shows a normal pattern of left ventricular diastolic filling.  Left Atrium: The left atrium is normal in size.  Right Ventricle: The right ventricle is normal in size. There is normal right ventricular global systolic function.  Right Atrium: The right atrium is normal in size.  Aortic Valve: The aortic valve is probably trileaflet. There is mild to moderate aortic valve regurgitation. The peak instantaneous gradient of the aortic valve is 8.3 mmHg. The mean gradient of the aortic valve is 4.0 mmHg.  Mitral Valve: The mitral valve is normal in structure. There is trace mitral valve regurgitation.  Tricuspid Valve: The tricuspid valve is structurally normal. There is trace tricuspid regurgitation. The Doppler estimated RVSP is within normal limits at 30.2 mmHg.  Pulmonic Valve: The pulmonic valve is not well visualized. There is physiologic pulmonic valve regurgitation.  Pericardium: There is a trivial pericardial effusion.  Aorta: The aortic root is normal.  In comparison to the previous echocardiogram(s): Compared with study from 2/5/2022, no significant change.      CONCLUSIONS:  1. Left ventricular systolic function is normal with a 55-60% estimated ejection fraction.  2. RVSP within normal limits.  3. Mild to moderate aortic valve regurgitation.    QUANTITATIVE DATA  SUMMARY:  2D MEASUREMENTS:  Normal Ranges:  LAs:           3.20 cm   (2.7-4.0cm)  IVSd:          1.20 cm   (0.6-1.1cm)  LVPWd:         1.20 cm   (0.6-1.1cm)  LVIDd:         3.80 cm   (3.9-5.9cm)  LVIDs:         2.90 cm  LV Mass Index: 72.2 g/m2  LV % FS        23.7 %    LA VOLUME:  Normal Ranges:  LA Vol A4C:        68.6 ml    (22+/-6mL/m2)  LA Vol A2C:        116.0 ml  LA Vol BP:         90.6 ml  LA Vol Index A4C:  32.3ml/m2  LA Vol Index A2C:  54.6 ml/m2  LA Vol Index BP:   42.7 ml/m2  LA Area A4C:       19.9 cm2  LA Area A2C:       26.3 cm2  LA Major Axis A4C: 4.9 cm  LA Major Axis A2C: 5.1 cm  LA Volume Index:   42.7 ml/m2    AORTA MEASUREMENTS:  Normal Ranges:  Asc Ao, d: 3.30 cm (2.1-3.4cm)    LV SYSTOLIC FUNCTION BY 2D PLANIMETRY (MOD):  Normal Ranges:  EF-A4C View: 40.5 % (>=55%)  EF-A2C View: 58.7 %  EF-Biplane:  50.1 %    LV DIASTOLIC FUNCTION:  Normal Ranges:  MV Peak E:        0.53 m/s    (0.7-1.2 m/s)  MV Peak A:        0.87 m/s    (0.42-0.7 m/s)  E/A Ratio:        0.61        (1.0-2.2)  MV lateral e'     0.06 m/s  MV medial e'      0.05 m/s  MV A Dur:         164.00 msec  E/e' Ratio:       9.00        (<8.0)  PulmV Sys Efrain:    55.90 cm/s  PulmV Finnegan Efrain:   39.10 cm/s  PulmV S/D Efrain:    1.40  PulmV A Revs Efrain: 33.60 cm/s  PulmV A Revs Dur: 100.00 msec    MITRAL VALVE:  Normal Ranges:  MV Vmax:    0.87 m/s (<=1.3m/s)  MV peak PG: 3.0 mmHg (<5mmHg)  MV mean P.0 mmHg (<2mmHg)  MV DT:      317 msec (150-240msec)    AORTIC VALVE:  Normal Ranges:  AoV Vmax:                1.44 m/s (<=1.7m/s)  AoV Peak P.3 mmHg (<20mmHg)  AoV Mean P.0 mmHg (1.7-11.5mmHg)  LVOT Max Efrain:            0.86 m/s (<=1.1m/s)  AoV VTI:                 29.00 cm (18-25cm)  LVOT VTI:                20.00 cm  AoV Dimensionless Index: 0.69    AORTIC INSUFFICIENCY:  AI Vmax:       2.84 m/s  AI Half-time:  392 msec  AI Decel Rate: 212.00 cm/s2      RIGHT VENTRICLE:  RV Basal 3.81 cm  RV Mid   3.26 cm  RV  Major 6.9 cm  TAPSE:   16.5 mm  RV s'    0.12 m/s    TRICUSPID VALVE/RVSP:  Normal Ranges:  Peak TR Velocity: 2.61 m/s  RV Syst Pressure: 30.2 mmHg (< 30mmHg)    PULMONIC VALVE:  Normal Ranges:  PV Accel Time: 156 msec (>120ms)  PV Max Efrain:    0.9 m/s  (0.6-0.9m/s)  PV Max PG:     3.4 mmHg    Pulmonary Veins:  PulmV A Revs Dur: 100.00 msec  PulmV A Revs Efrain: 33.60 cm/s  PulmV Finnegan Efrain:   39.10 cm/s  PulmV S/D Efrain:    1.40  PulmV Sys Efrain:    55.90 cm/s      [id number] Phylicia Ceja MD  Electronically signed on 1/25/2024 at 9:46:42 AM        ** Final **       Cath:      Stress Test:  Stress Results:  Regadenoson Stress Test With Myocardial Perfusion Spect (Single Study) 09/11/2024    Interpretation Summary  Interpreted By:  Reagan Christensen and Nakamoto Kent  STUDY:  NUCLEAR STRESS TEST;  9/11/2024 10:44 am    INDICATION:  Signs/Symptoms:New CP.    ,R07.9 Chest pain, unspecified,R07.9 Chest pain, unspecified    COMPARISON:  Cardiac stress test 09/30/2021    ACCESSION NUMBER(S):  [id number]    ORDERING CLINICIAN:  PHYLICIA CEJA    TECHNIQUE:  DIVISION OF NUCLEAR MEDICINE  PHARMACOLOGIC STRESS MYOCARDIAL PERFUSION SCAN, ONE DAY PROTOCOL    The patient received an intravenous injection of 10.2 mCi of Tc-99m  Myoview and resting emission tomographic (SPECT) images of the  myocardium were acquired. The patient then received an intravenous  infusion of 0.4 mg regadenoson (Lexiscan) followed by an additional  injection of 34 mCi of Tc-99m Myoview. Stress phase SPECT images of  the myocardium were then acquired. These included ECG-gated  post-stress and rest images to assess and quantify ventricular  function.    FINDINGS:  Both stress and rest studies demonstrate normal perfusion throughout  the left ventricle.    The left ventricle is normal in size.    EKG-gated images demonstrate normal LV wall motion with a post-stress  LV EF estimated at greater than 65%.    Impression  No evidence of inducible myocardial ischemia or prior  infarction.    The left ventricle is normal in size.    Normal LV wall motion with a post-stress LV EF estimated at greater  than 65%.    I personally reviewed the images/study and I agree with the findings  as stated.  This study was interpreted at [facility], [place], [place].    MACRO:  None    Signed by: Reagan Christensen 9/11/2024 3:03 PM  Dictation workstation:   [id number]         Cardiac Imaging:  XR hip right with pelvis when performed 2 or 3 views  Narrative: Interpreted By:  Rosangela Wise,   STUDY:  Single view pelvis.  Two views right hip.      INDICATION:  Signs/Symptoms:pain      COMPARISON:  05/18/2017.      ACCESSION NUMBER(S):  [id number]      ORDERING CLINICIAN:  VIOLETTE PAEZ      FINDINGS:  No acute fracture or malalignment.  Patient is status post right total hip arthroplasty. No perihardware  fractures or lucencies. Alignment is anatomic. Left hip arthroplasty  hardware is intact as well.      Lower lumbar spine degenerative changes at L4-5 and L5-S1.      Impression: 1.  Right total hip arthroplasty without hardware complication.  2. Lower lumbar spine degenerative changes.      MACRO:  None.      Signed by: Rosangela Wise 9/14/2024 9:51 AM  Dictation workstation:   [id number]  XR foot left 3+ views  Narrative: Interpreted By:  Rosangela Wise,   STUDY:  Left foot, 3 views      INDICATION:  Signs/Symptoms:pain      COMPARISON:  01/24/2023.      ACCESSION NUMBER(S):  [id number]      ORDERING CLINICIAN:  VIOLETTE PAEZ      FINDINGS:  No acute fracture.  Hallux valgus with degenerative bunion formation of the 1st  metatarsal head. Pes planus.  Moderate talonavicular joint osteoarthrosis with dorsal osteophytes.  Plantar calcaneal spur which can be associated with plantar fasciitis.  Soft tissues are within normal limits.      Impression: 1. As above.      MACRO:  None.      Signed by: Rosangela Wise 9/14/2024 9:51 AM  Dictation workstation:    [id number]        Assessment/Plan     In 2022 she had a non-ST elevation myocardial infarction she was found to have moderate three-vessel CAD with elevated LV end-diastolic pressure.  She continues to experience noncardiac sharp left-sided chest discomfort.  No clear triggers usually at rest over very circumscribed area.  With active pain I could not demonstrate troponin rise or acute EKG changes and a pharmacologic nuclear stress test showed no evidence of scar or ischemia I believe this chest discomfort is likely noncardiac possibly musculoskeletal.  She has had some luck in losing weight and she is doing generally good job with regard to her risk factors.  I have made no med changes gave her some reassurance suggested a 6-month follow-up thank you for your referral    Orders:  No orders of the defined types were placed in this encounter.     Followup Appts:  Future Appointments   Date Time Provider Department Center   9/23/2024  3:20 PM Stephen Ceja MD [id number] [facility]   9/25/2024  3:30 PM Shekhar Cox MD [id number] [facility]   10/24/2024  4:00 PM Robert Jiang MD [id number] [facility]   11/21/2024  9:00 AM Hunter Gong MD [id number] [facility]   12/2/2024 10:00 AM AHU CT 1 [facility]   1/2/2025  2:30 PM Kalyn Ingram MD [id number] [facility]   1/23/2025 10:00 AM Kristina Tellez MD [id number] [facility]   3/6/2025 10:00 AM Reba [id number] [place]   3/6/2025 10:30 AM Laura Armando MD [id number] [place]   7/23/2025 10:00 AM Kristina Tellez MD [id number] [facility]           ____________________________________________________________  Stephen Ceja MD    Senior Attending Physician  [facility]  [facility]

## 2024-09-25 ENCOUNTER — OFFICE VISIT (OUTPATIENT)
Dept: ORTHOPEDIC SURGERY | Facility: CLINIC | Age: 64
End: 2024-09-25
Payer: COMMERCIAL

## 2024-09-25 VITALS — BODY MASS INDEX: 36.54 KG/M2 | WEIGHT: 223 LBS

## 2024-09-25 DIAGNOSIS — M21.6X2 PLANTAR FAT PAD ATROPHY OF LEFT FOOT: ICD-10-CM

## 2024-09-25 DIAGNOSIS — M84.375A STRESS FRACTURE OF LEFT FOOT, INITIAL ENCOUNTER: Primary | ICD-10-CM

## 2024-09-25 PROCEDURE — 99213 OFFICE O/P EST LOW 20 MIN: CPT | Performed by: STUDENT IN AN ORGANIZED HEALTH CARE EDUCATION/TRAINING PROGRAM

## 2024-09-25 PROCEDURE — 3050F LDL-C >= 130 MG/DL: CPT | Performed by: STUDENT IN AN ORGANIZED HEALTH CARE EDUCATION/TRAINING PROGRAM

## 2024-09-25 PROCEDURE — 1125F AMNT PAIN NOTED PAIN PRSNT: CPT | Performed by: STUDENT IN AN ORGANIZED HEALTH CARE EDUCATION/TRAINING PROGRAM

## 2024-09-25 PROCEDURE — 3044F HG A1C LEVEL LT 7.0%: CPT | Performed by: STUDENT IN AN ORGANIZED HEALTH CARE EDUCATION/TRAINING PROGRAM

## 2024-09-25 ASSESSMENT — PAIN SCALES - GENERAL: PAINLEVEL: 10-WORST PAIN EVER

## 2024-09-26 NOTE — PROGRESS NOTES
ORTHOPAEDIC SURGERY OUTPATIENT PROGRESS NOTE    Chief Complaint:  Left heel pain    History Of Present Illness  Isabelle Abel is a 64 y.o. female presents for evaluation of left heel pain that began atraumatically.  Patient has been experiencing 1.5 months of pain pain is made worse with walking and standing.  She notices more pain with the first several steps in the morning and when getting up from a seated position.  She has had some improvement in her symptoms overall with inserts in her shoes.  She reports no prior history of similar injury or pain.  The pain is not associated with any significant numbness, tingling or weakness.  Patient has no prior history of DVT.  Patient is a non-insulin-dependent diabetic with last HbA1c in the system of 6.6% from 04/04/2024.     Past Medical History  Past Medical History:   Diagnosis Date    Asthma ([facility])     Encounter for follow-up examination after completed treatment for conditions other than malignant neoplasm 05/20/2021    Hospital discharge follow-up    Encounter for gynecological examination (general) (routine) without abnormal findings 03/11/2019    Encounter for annual routine gynecological examination    Hypertension     Non-ST elevation (NSTEMI) myocardial infarction (Multi) 02/28/2022    Non-ST elevation myocardial infarction (NSTEMI) in recovery phase    Personal history of other diseases of the respiratory system 05/19/2018    History of chronic obstructive lung disease    Zoster without complications 10/14/2020    Herpes zoster without complication       Surgical History  Recent Surgeries in Orthopaedic Surgery            No cases to display             Social History  Social History     Socioeconomic History    Marital status:    Tobacco Use    Smoking status: Former     Types: Cigarettes    Smokeless tobacco: Never   Vaping Use    Vaping status: Never Used   Substance and Sexual Activity    Alcohol use: Not Currently    Drug use: Never      Social Determinants of Health     Financial Resource Strain: Not on File (2020)    Received from [facility]    Financial Resource Strain     Financial Resource Strain: 0   Food Insecurity: Not on File (2020)    Received from [facility]    Food Insecurity     Food: 0   Transportation Needs: Not on File (2020)    Received from [facility]    Transportation Needs     Transportation: 0   Physical Activity: Not on File (2020)    Received from [facility]    Physical Activity     Physical Activity: 0   Stress: No Stress Concern Present (2024)    [employer] - Occupational Stress Questionnaire     Feeling of Stress : Not at all   Social Connections: Not on File (2020)    Received from [facility]    Social Connections     Social Connections and Isolation: 0   Housing Stability: Not on File (2020)    Received from [facility]    Housing Stability     Housin       Family History  Family History   Problem Relation Name Age of Onset    Other (abdominal aneurysm) Mother      Heart attack Mother      Alzheimer's disease Father      Breast cancer Sister 50     Transient ischemic attack Sister 50     Breast cancer Sibling      Diabetes Maternal Cousin      Breast cancer Other mat relatives     Allergies Other          Allergies  Allergies   Allergen Reactions    Ciprofloxacin Hcl Itching       Review of Systems  REVIEW OF SYSTEMS  Constitutional: no unplanned weight loss  Psychiatric: no suicidal ideation  ENT: no vision changes, no sinus problems  Pulmonary: no shortness of breath  Lymphatic: no enlarged lymph nodes  Cardiovascular: no chest pain or shortness of breath  Gastrointestinal: no stomach problems  Genitourinary: no dysuria   Skin: no rashes  Endocrine: no thyroid problems  Neurological: no headache, no numbness  Hematological: no easy bruising  Musculoskeletal: Left heel pain     Physical Exam  PHYSICAL EXAMINATION  Constitutional Exam:  well developed and well nourished  Psychiatric Exam: alert and oriented, appropriate mood and behavior  Eye Exam: EOMI  Pulmonary Exam: breathing non-labored, no apparent distress  Lymphatic exam: no appreciable lymphadenopathy in the lower extremities  Cardiovascular exam: RRR to peripheral palpation, DP pulses 2+, PT 2+, toes are pink with good capillary refill, no pitting edema  Skin exam: no open lesions, rashes, abrasions or ulcerations  Neurological exam: sensation to light touch intact in both lower extremities in peripheral and dermatomal distributions (except for any abnormalities noted in musculoskeletal exam)    Musculoskeletal exam: Left lower extremity examination.  Patient pain localized to the plantar heel centrally, there is noted fat pad atrophy at both the level of the calcaneus as well as the metatarsal heads.  She is tender to palpation at the calcaneus, relatively nontender to palpation along the medial longitudinal band of plantar fascia as well as the Achilles.  Patient has relatively restricted but pain-free subtalar and midtarsal joint range of motion.  Patient has greater than physiologic hindfoot valgus with pes planus arch posture and no significant forefoot deformity noted. Patient has sensation intact to light touch grossly in a saphenous, sural, superficial peroneal, deep peroneal and tibial nerve distribution.  Patient has 5 out of 5 strength in plantarflexion, dorsiflexion and EHL. Patient has 2+ DP/PT pulse palpated.     Last Recorded Vitals  Weight 101 kg (223 lb).    Laboratory Results  No results found for this or any previous visit (from the past 24 hour(s)).     Radiology Results  X-ray imaging 3 view weightbearing left foot reviewed from 09/13/2024 and independently evaluated by me demonstrates no acute fracture or dislocation.  Patient has decreased joint space of the first metatarsophalangeal joint with hallux valgus deformity as well as approximately 30% talar head  uncovering with significant talonavicular dorsal osteophytes noted on the lateral projection with relative plantarflexion of the talus and decreased joint space of the posterior facet of the subtalar joint with increased sclerosis of the critical angle of gissane with significant pes planus posture noted.    Assessment/Plan:  64-year-old female who my impression has left foot pain likely secondary to calcaneus stress injury in the setting of progressive collapsing foot deformity with features of rigid pes planus posture and TN/ST OA.  I have reviewed the diagnosis and treatment options extensively with the patient.  I would recommend the patient begin a period of directed weightbearing in her left lower extremity in a walking boot.  I discussed that this is an overuse injury the patient should take great care to avoid activities that incur additional pain.  She may continue with her HEP for plantar fascia stretching.  I will plan to see the patient back in approximately 1 month to follow her clinical course.  I would anticipate transition the patient out of the walking boot and into regular shoes with consideration for heel lifts or .  Upon return, patient would not require further imaging.    Shekhar Cox MD, GABRIELLE  Department of Orthopaedic Surgery  [facility]    The diagnosis and treatment plan were reviewed with the patient. All questions were answered. The patient verbalized understanding of the treatment plan. There were no barriers to understanding identified.    Note dictated with [employer] software.  Completed without full type editing and sent to avoid delay.

## 2024-10-24 ENCOUNTER — OFFICE VISIT (OUTPATIENT)
Dept: ORTHOPEDIC SURGERY | Facility: CLINIC | Age: 64
End: 2024-10-24
Payer: MEDICARE

## 2024-10-24 VITALS — WEIGHT: 223 LBS | HEIGHT: 66 IN | BODY MASS INDEX: 35.84 KG/M2

## 2024-10-24 DIAGNOSIS — R20.0 NUMBNESS OF RIGHT ANTERIOR THIGH: ICD-10-CM

## 2024-10-24 PROCEDURE — 99213 OFFICE O/P EST LOW 20 MIN: CPT | Performed by: ORTHOPAEDIC SURGERY

## 2024-10-24 PROCEDURE — 3044F HG A1C LEVEL LT 7.0%: CPT | Performed by: ORTHOPAEDIC SURGERY

## 2024-10-24 PROCEDURE — 1159F MED LIST DOCD IN RCRD: CPT | Performed by: ORTHOPAEDIC SURGERY

## 2024-10-24 PROCEDURE — 3050F LDL-C >= 130 MG/DL: CPT | Performed by: ORTHOPAEDIC SURGERY

## 2024-10-24 PROCEDURE — 3008F BODY MASS INDEX DOCD: CPT | Performed by: ORTHOPAEDIC SURGERY

## 2024-10-24 ASSESSMENT — PAIN SCALES - GENERAL: PAINLEVEL_OUTOF10: 9

## 2024-10-24 ASSESSMENT — PAIN - FUNCTIONAL ASSESSMENT: PAIN_FUNCTIONAL_ASSESSMENT: 0-10

## 2024-10-24 ASSESSMENT — PAIN DESCRIPTION - DESCRIPTORS: DESCRIPTORS: BURNING

## 2024-10-24 NOTE — LETTER
October 24, 2024     Patient: Isabelle Abel   YOB: 1960   Date of Visit: 10/24/2024       To Whom It May Concern:    It is my medical opinion that Isabelle Abel may return to work on 10/28/24 .    If you have any questions or concerns, please don't hesitate to call.         Sincerely,        Robert Jiang MD    CC: No Recipients

## 2024-10-30 ENCOUNTER — OFFICE VISIT (OUTPATIENT)
Dept: ORTHOPEDIC SURGERY | Facility: CLINIC | Age: 64
End: 2024-10-30
Payer: MEDICARE

## 2024-10-30 DIAGNOSIS — M72.2 PLANTAR FASCIITIS OF LEFT FOOT: ICD-10-CM

## 2024-10-30 DIAGNOSIS — M84.375A STRESS FRACTURE OF LEFT FOOT, INITIAL ENCOUNTER: Primary | ICD-10-CM

## 2024-10-30 PROCEDURE — 99212 OFFICE O/P EST SF 10 MIN: CPT | Performed by: STUDENT IN AN ORGANIZED HEALTH CARE EDUCATION/TRAINING PROGRAM

## 2024-10-30 PROCEDURE — 1159F MED LIST DOCD IN RCRD: CPT | Performed by: STUDENT IN AN ORGANIZED HEALTH CARE EDUCATION/TRAINING PROGRAM

## 2024-10-30 PROCEDURE — 3050F LDL-C >= 130 MG/DL: CPT | Performed by: STUDENT IN AN ORGANIZED HEALTH CARE EDUCATION/TRAINING PROGRAM

## 2024-10-30 PROCEDURE — 3044F HG A1C LEVEL LT 7.0%: CPT | Performed by: STUDENT IN AN ORGANIZED HEALTH CARE EDUCATION/TRAINING PROGRAM

## 2024-10-30 ASSESSMENT — PAIN SCALES - GENERAL: PAINLEVEL_OUTOF10: 5 - MODERATE PAIN

## 2024-10-30 ASSESSMENT — PAIN DESCRIPTION - DESCRIPTORS: DESCRIPTORS: ACHING;SHARP

## 2024-10-30 ASSESSMENT — PAIN - FUNCTIONAL ASSESSMENT: PAIN_FUNCTIONAL_ASSESSMENT: 0-10

## 2024-11-18 PROBLEM — R20.0 NUMBNESS OF RIGHT ANTERIOR THIGH: Status: ACTIVE | Noted: 2024-11-18

## 2024-11-18 NOTE — PROGRESS NOTES
Patient is a pleasant 64-year-old female presents today for evaluation of right thigh numbness and some pain.  She previous underwent uncomplicated right total hip arthroplasty.  She had no problems after surgery in regards to wound healing or infection.    Right hip:  AAOx3, NAD, walks with a non-antalgic gait  No pain with flexion internal rotation  Negative Stinchfield  Mild TTP over trochanter laterally  5/5 Hip flexion/knee extension/df/pf/ehl  SILT in a alla/saph/per/tib distribution  ½ dorsalis pedis and posterior tibial pulse  no popliteal or inguinal lymphadenopathy  no other overlying lesions  mood: euthymic  Respirations non labored  Incision healed    X-rays reviewed by myself today in clinic reveal excellent alignment of the total hip arthroplasty components with no signs of early loosening or failure.    I discussed with her today that her hip looks fine.  I cannot explain her pain based off her exam or x-rays of her total hip.  It may be radicular in nature.  Will refer her to pain management for further evaluation.  All of her questions were answered.  She will follow-up with me on an as-needed basis.    This note was created using voice recognition software and was not corrected for typographical or grammatical errors.

## 2024-11-21 ENCOUNTER — OFFICE VISIT (OUTPATIENT)
Dept: PULMONOLOGY | Facility: CLINIC | Age: 64
End: 2024-11-21
Payer: MEDICARE

## 2024-11-21 VITALS
WEIGHT: 230.2 LBS | TEMPERATURE: 97.3 F | HEART RATE: 92 BPM | SYSTOLIC BLOOD PRESSURE: 114 MMHG | BODY MASS INDEX: 37.72 KG/M2 | OXYGEN SATURATION: 96 % | RESPIRATION RATE: 16 BRPM | DIASTOLIC BLOOD PRESSURE: 74 MMHG

## 2024-11-21 DIAGNOSIS — F17.211 CIGARETTE NICOTINE DEPENDENCE IN REMISSION: ICD-10-CM

## 2024-11-21 DIAGNOSIS — J32.9 CHRONIC SINUSITIS, UNSPECIFIED LOCATION: ICD-10-CM

## 2024-11-21 DIAGNOSIS — G47.33 OSA (OBSTRUCTIVE SLEEP APNEA): ICD-10-CM

## 2024-11-21 DIAGNOSIS — J44.9 CHRONIC OBSTRUCTIVE PULMONARY DISEASE, UNSPECIFIED COPD TYPE (MULTI): ICD-10-CM

## 2024-11-21 DIAGNOSIS — E66.9 OBESITY (BMI 30-39.9): Primary | ICD-10-CM

## 2024-11-21 PROCEDURE — 1036F TOBACCO NON-USER: CPT | Performed by: INTERNAL MEDICINE

## 2024-11-21 PROCEDURE — 3074F SYST BP LT 130 MM HG: CPT | Performed by: INTERNAL MEDICINE

## 2024-11-21 PROCEDURE — 1160F RVW MEDS BY RX/DR IN RCRD: CPT | Performed by: INTERNAL MEDICINE

## 2024-11-21 PROCEDURE — 3078F DIAST BP <80 MM HG: CPT | Performed by: INTERNAL MEDICINE

## 2024-11-21 PROCEDURE — 99417 PROLNG OP E/M EACH 15 MIN: CPT | Performed by: INTERNAL MEDICINE

## 2024-11-21 PROCEDURE — 1159F MED LIST DOCD IN RCRD: CPT | Performed by: INTERNAL MEDICINE

## 2024-11-21 PROCEDURE — 3050F LDL-C >= 130 MG/DL: CPT | Performed by: INTERNAL MEDICINE

## 2024-11-21 PROCEDURE — 99215 OFFICE O/P EST HI 40 MIN: CPT | Performed by: INTERNAL MEDICINE

## 2024-11-21 PROCEDURE — 3044F HG A1C LEVEL LT 7.0%: CPT | Performed by: INTERNAL MEDICINE

## 2024-11-21 RX ORDER — ALBUTEROL SULFATE 90 UG/1
1-2 INHALANT RESPIRATORY (INHALATION) EVERY 4 HOURS PRN
Qty: 18 G | Refills: 1 | Status: SHIPPED | OUTPATIENT
Start: 2024-11-21

## 2024-11-21 RX ORDER — AZELASTINE 1 MG/ML
1 SPRAY, METERED NASAL 2 TIMES DAILY
Qty: 30 ML | Refills: 0 | Status: SHIPPED | OUTPATIENT
Start: 2024-11-21 | End: 2025-01-20

## 2024-11-21 RX ORDER — FLUTICASONE FUROATE, UMECLIDINIUM BROMIDE AND VILANTEROL TRIFENATATE 200; 62.5; 25 UG/1; UG/1; UG/1
1 POWDER RESPIRATORY (INHALATION) DAILY
Qty: 30 EACH | Refills: 11 | Status: SHIPPED | OUTPATIENT
Start: 2024-11-21 | End: 2025-11-21

## 2024-11-21 ASSESSMENT — ENCOUNTER SYMPTOMS
CONSTIPATION: 0
LIGHT-HEADEDNESS: 1
HEADACHES: 0
RHINORRHEA: 1
ARTHRALGIAS: 0
EYE ITCHING: 0
EYE PAIN: 0
HEMATURIA: 0
COUGH: 1
WHEEZING: 1
MYALGIAS: 1
SINUS PAIN: 0
NAUSEA: 0
FEVER: 0
APPETITE CHANGE: 1
PALPITATIONS: 0
SEIZURES: 0
DIARRHEA: 0
VOMITING: 0
EYE REDNESS: 0
FATIGUE: 1
DYSPHORIC MOOD: 0
DYSURIA: 0
BACK PAIN: 0
CONFUSION: 0
NECK PAIN: 0
SHORTNESS OF BREATH: 1
ABDOMINAL PAIN: 0
DIZZINESS: 1
CHEST TIGHTNESS: 0
SORE THROAT: 0
UNEXPECTED WEIGHT CHANGE: 1
NERVOUS/ANXIOUS: 0
WOUND: 0
SINUS PRESSURE: 0
CHILLS: 0

## 2024-11-21 ASSESSMENT — COPD QUESTIONNAIRES
QUESTION3_CHESTTIGHTNESS: 0 - MY CHEST DOES NOT FEEL TIGHT AT ALL
QUESTION2_CHESTPHLEGM: 0 - I HAVE NO PHLEGM (MUCUS) IN MY CHEST AT ALL
QUESTION6_LEAVINGHOUSE: 0 - I AM CONFIDENT LEAVING MY HOME DESPITE MY LUNG CONDITION
QUESTION4_WALKINCLINE: 3
QUESTION8_ENERGYLEVEL: 3
QUESTION1_COUGHFREQUENCY: 2
CAT_TOTALSCORE: 12
QUESTION7_SLEEPQUALITY: 1
QUESTION5_HOMEACTIVITIES: 3

## 2024-11-21 ASSESSMENT — ASTHMA QUESTIONNAIRES
QUESTION_2 LAST FOUR WEEKS HOW OFTEN HAVE YOU HAD SHORTNESS OF BREATH: 1 OR 2 TIMES PER WEEK
ACT_TOTALSCORE: 21
QUESTION_1 LAST FOUR WEEKS HOW MUCH OF THE TIME DID YOUR ASTHMA KEEP YOU FROM GETTING AS MUCH DONE AT WORK, SCHOOL OR AT HOME: NONE OF THE TIME
QUESTION_3 LAST FOUR WEEKS HOW OFTEN DID YOUR ASTHMA SYMPTOMS (WHEEZING, COUGHING, SHORTNESS OF BREATH, CHEST TIGHTNESS OR PAIN) WAKE YOU UP AT NIGHT OR EARLIER THAN USUAL IN THE MORNING: NOT AT ALL
QUESTION_4 LAST FOUR WEEKS HOW OFTEN HAVE YOU USED YOUR RESCUE INHALER OR NEBULIZER MEDICATION (SUCH AS ALBUTEROL): ONCE A WEEK OR LESS
QUESTION_5 LAST FOUR WEEKS HOW WOULD YOU RATE YOUR ASTHMA CONTROL: SOMEWHAT CONTROLLED

## 2024-11-21 NOTE — PROGRESS NOTES
Subjective   Patient ID: Isabelle Abel is a 64 y.o. female who presents for COPD and Asthma.  Asthma  She complains of cough, shortness of breath and wheezing. Associated symptoms include appetite change, myalgias, postnasal drip and rhinorrhea. Pertinent negatives include no chest pain, fever, headaches or sore throat. Her past medical history is significant for asthma.   Patient with h/o COPD, Vertigo, HTN, COVID-19 in 11/2021, pre diabetic, gout, previously a patient of Dr. Gharibeh, now here for further management.   Last pulmonary visit 6 months ago, no major events since then.   Overall is feeling better than the last visit.  COPD was diagnosed in 2018, with a breathing test. Was told that she might have ACOS. Currently on Trelegy (uses it every day) and DuoNeb and ProAir. Uses her DuoNeb and albuterol together on average 1-2 times a month.  No nightly attacks.   No SOB at rest. POPE after walking 1 city block, or climbing 1-2 FOS. No orthopnea, no PND. +ve LE edema, but improved since the last visit. POPE started since 2017, gradually, progressive but now improved since the last visit. Associated with wheezing but  no CP.   +ve wheezing, on average less than 1 time a week, some improvement with rescue inhaler.   +ve cough, chronic, mild, at times productive of clear sputum.. Cough and sputum stable/improved since the last visit. No hemoptysis.   Diagnosed with ADILSON years ago, AHI 11, not wearing CPAP. At time does not feel rested in am.      Exposure: quit smoking in 12/2022, > 40 pack/year h/o smoking. Worked for [employer] work, no known exposures. She reports of previous exposure to an active TB patient.   Current Outpatient Medications   Medication Instructions    albuterol 90 mcg/actuation inhaler 1-2 puffs, inhalation, Every 4 hours PRN    allopurinol (ZYLOPRIM) 300 mg, oral, Daily    amLODIPine (NORVASC) 2.5 mg, oral, Daily    aspirin 81 mg, oral, Daily    atorvastatin (LIPITOR) 80 mg, oral, Daily     azelastine (Astelin) 137 mcg (0.1 %) nasal spray 1 spray, Each Nostril, 2 times daily    blood-glucose meter (OneTouch Ultra2 Meter) misc Use to check glucose levels twice a day    clotrimazole-betamethasone (Lotrisone) cream 1 Application, 2 times daily    dorzolamide (Trusopt) 2 % ophthalmic solution No dose, route, or frequency recorded.    fluticasone-umeclidin-vilanter (Trelegy Ellipta) 200-62.5-25 mcg blister with device 1 puff, inhalation, Daily    ipratropium-albuteroL (Duo-Neb) 0.5-2.5 mg/3 mL nebulizer solution 3 mL, Every 6 hours PRN    lancets (OneTouch Delica Plus Lancet) 30 gauge misc 2 Lancets, miscellaneous, Daily    meclizine (ANTIVERT) 25 mg, oral, 3 times daily PRN    meloxicam (Mobic) 7.5 mg tablet Take 1 tablet by mouth every 12 hours as needed for pain control    metFORMIN (GLUCOPHAGE) 500 mg, oral, Daily with breakfast    nitroglycerin (NITROSTAT) 0.4 mg, Every 5 min PRN    triamcinolone (Kenalog) 0.1 % cream 2 times daily   Review of Systems   Constitutional:  Positive for appetite change, fatigue and unexpected weight change (due to metformin). Negative for chills and fever.   HENT:  Positive for congestion, postnasal drip and rhinorrhea. Negative for sinus pressure, sinus pain and sore throat.    Eyes:  Negative for pain, redness, itching and visual disturbance.   Respiratory:  Positive for cough, shortness of breath and wheezing. Negative for chest tightness.    Cardiovascular:  Positive for leg swelling. Negative for chest pain and palpitations.   Gastrointestinal:  Negative for abdominal pain, constipation, diarrhea, nausea and vomiting.   Genitourinary:  Negative for dysuria and hematuria.   Musculoskeletal:  Positive for myalgias. Negative for arthralgias, back pain and neck pain.   Skin:  Positive for rash (eczema). Negative for wound.   Neurological:  Positive for dizziness and light-headedness. Negative for seizures, syncope and headaches.   Psychiatric/Behavioral:  Negative for  confusion and dysphoric mood. The patient is not nervous/anxious.    Objective   Visit Vitals  /74 (BP Location: Right arm, Patient Position: Sitting)   Pulse 92   Temp 36.3 °C (97.3 °F) (Temporal)   Resp 16   Physical Exam  Constitutional:       General: She is not in acute distress.     Appearance: She is obese. She is not ill-appearing or toxic-appearing.   HENT:      Head: Normocephalic and atraumatic.      Nose:      Comments: On RA     Mouth/Throat:      Mouth: Mucous membranes are moist.      Comments: Mallampati 2-3  Eyes:      General: No scleral icterus.     Extraocular Movements: Extraocular movements intact.      Pupils: Pupils are equal, round, and reactive to light.   Cardiovascular:      Rate and Rhythm: Normal rate and regular rhythm.      Heart sounds: No murmur heard.     No friction rub. No gallop.   Pulmonary:      Effort: No respiratory distress.      Breath sounds: No wheezing or rales.      Comments: Fair air entry.  Abdominal:      General: Bowel sounds are normal. There is no distension.      Palpations: Abdomen is soft.      Tenderness: There is no abdominal tenderness.   Musculoskeletal:         General: No tenderness. Normal range of motion.      Cervical back: Normal range of motion and neck supple. No rigidity or tenderness.      Right lower leg: Edema (Trace) present.      Left lower leg: Edema (Trace) present.   Lymphadenopathy:      Cervical: No cervical adenopathy.   Skin:     General: Skin is warm and dry.      Coloration: Skin is not jaundiced.   Neurological:      General: No focal deficit present.      Mental Status: She is alert and oriented to person, place, and time.      Cranial Nerves: No cranial nerve deficit.      Motor: No weakness.   Psychiatric:         Mood and Affect: Mood normal.         Behavior: Behavior normal.     Results/Data  CAT from today 12. ACT from today also 21  Stress test from 9/2024 negative.      The following were reviewed during the previous  visits.       I personally reviewed the images for the chest CT from 11/2023 that showed a new 3.5 mm RLL nodule and emphysema.       I personally reviewed the images for the CXR from 2/2024 and 3/2024, both without an acute cardiopulmonary process.  Echo result from 1/2024 that showed: normal LV, RV, RVSF and RVSP 30.       I personally reviewed the images for the chest CT and -rays from 11/2022, that did not show any significant abnormalities.   PSG result from 11/2022 reviewed that showed mild ADILSON with RDI 13, controlled with CPAP of 8.   I personally reviewed the images for the chest CT from 9/2021 that showed stable nodules. Official read as below.  Echoes from 8/2021 and 2/2022 reviewed. 8/2021: HFpEF, RVSP 12, normal RVSF, dilated RV. 2/2022: HFpEF, normal RV, reduced RVSF  Cardiac cath from 2/2022 showed 30-40% RCA and Left main stenosis.  CT calcium score 87  Stress test 9/2021 negative.   Echo result form 2017 reviewed that showed: HFpEF, Normal RV, RVSF, RVSP 28.   I personally interpreted the PFT from 8/2019 that showed: FEV1/FVC 68%, FEV1 102%, TLC 83%, DLCO 87%.   I personally reviewed the images for the chest CT from 8/2020 that showed 2-3 mm lung nodules. Official read as below.   Assessment/Plan   63 YOF, here for further management of her COPD.      1. COPD: GOLD 1, CAT today 12,  with recent exacerbations (on average once a year), category D, but overall symptoms controlled and markedly improved.    Continue Trelegy   continue DuoNeb + Albuterol as needed (infrequent use)     2. CHF:    referred to cardiology, management as per them.      3. ADILSON: not on CPAP, seen by sleep, split night PSG done that showed mild ADILSON and recommended APAP 8-15 with nasal pillow.    discussed the options, she wants to hold off CPAP with plan to lose weight first      4. Smoking: quit 12/2022. Qualifies for yearly chest CT. Last CT 11/2022 no nodules. Repeat CT in 11/2023 showed a new 3.5 mm RLL nodule.    need low  dose chest CT in 12/2024.     5. PND: symptoms relatively controlled on Azelastine   continue Azelastine.      6. Obesity: patient has lose some weight after being started on Metformin.    advised on losing more weight.     RTC in six months with the result of chest CT.   Hunter Gong MD 11/21/24 9:23 AM

## 2024-12-02 ENCOUNTER — HOSPITAL ENCOUNTER (OUTPATIENT)
Dept: RADIOLOGY | Facility: HOSPITAL | Age: 64
Discharge: HOME | End: 2024-12-02
Payer: MEDICARE

## 2024-12-02 DIAGNOSIS — F17.211 CIGARETTE NICOTINE DEPENDENCE IN REMISSION: ICD-10-CM

## 2024-12-02 PROCEDURE — 71271 CT THORAX LUNG CANCER SCR C-: CPT

## 2024-12-18 ENCOUNTER — OFFICE VISIT (OUTPATIENT)
Dept: ORTHOPEDIC SURGERY | Facility: CLINIC | Age: 64
End: 2024-12-18
Payer: MEDICARE

## 2024-12-18 DIAGNOSIS — M84.375A STRESS FRACTURE OF LEFT FOOT, INITIAL ENCOUNTER: Primary | ICD-10-CM

## 2024-12-18 PROCEDURE — 99212 OFFICE O/P EST SF 10 MIN: CPT | Performed by: STUDENT IN AN ORGANIZED HEALTH CARE EDUCATION/TRAINING PROGRAM

## 2024-12-18 ASSESSMENT — PAIN DESCRIPTION - DESCRIPTORS: DESCRIPTORS: DULL

## 2024-12-18 ASSESSMENT — PAIN - FUNCTIONAL ASSESSMENT: PAIN_FUNCTIONAL_ASSESSMENT: 0-10

## 2024-12-18 ASSESSMENT — PAIN SCALES - GENERAL: PAINLEVEL_OUTOF10: 0 - NO PAIN

## 2024-12-18 NOTE — PROGRESS NOTES
ORTHOPAEDIC SURGERY OUTPATIENT PROGRESS NOTE    Chief Complaint:  Left heel pain    History Of Present Illness  Isabelle Abel is a 64 y.o. female presents for evaluation of left heel pain that began atraumatically.  Patient has been experiencing 1.5 months of pain pain is made worse with walking and standing.  She notices more pain with the first several steps in the morning and when getting up from a seated position.  She has had some improvement in her symptoms overall with inserts in her shoes.  She reports no prior history of similar injury or pain.  The pain is not associated with any significant numbness, tingling or weakness.  Patient has no prior history of DVT.  Patient is a non-insulin-dependent diabetic with last HbA1c in the system of 6.6% from 04/04/2024.    10/30/2024: Patient returns for follow-up of her left foot pain.  Typical pain is now reported as 5 out of 10, and has steadily been improving.  She has been utilizing the walking boot most often while at work.  She has previously received orthotics which has been using in her regular shoes when not wearing the boot and have been helpful.  She denies any interval trauma.    12/18/2024: Patient returns for follow-up of her left foot pain.  Patient is currently reporting no pain.  She utilized a walking boot and inserts with near complete resolution of her symptoms.  She has been quite pleased with the results of treatment to this point.     Past Medical History  Past Medical History:   Diagnosis Date    Asthma     Encounter for follow-up examination after completed treatment for conditions other than malignant neoplasm 05/20/2021    Hospital discharge follow-up    Encounter for gynecological examination (general) (routine) without abnormal findings 03/11/2019    Encounter for annual routine gynecological examination    Hypertension     Non-ST elevation (NSTEMI) myocardial infarction (Multi) 02/28/2022    Non-ST elevation myocardial infarction  (NSTEMI) in recovery phase    Personal history of other diseases of the respiratory system 2018    History of chronic obstructive lung disease    Zoster without complications 10/14/2020    Herpes zoster without complication       Surgical History  Recent Surgeries in Orthopaedic Surgery            No cases to display             Social History  Social History     Socioeconomic History    Marital status:    Tobacco Use    Smoking status: Former     Types: Cigarettes    Smokeless tobacco: Never   Vaping Use    Vaping status: Never Used   Substance and Sexual Activity    Alcohol use: Not Currently    Drug use: Never     Social Drivers of Health     Financial Resource Strain: Not on File (2020)    Received from [facility]    Financial Resource Strain     Financial Resource Strain: 0   Food Insecurity: Not on File (2020)    Received from [facility]    Food Insecurity     Food: 0   Transportation Needs: Not on File (2020)    Received from [facility]    Transportation Needs     Transportation: 0   Physical Activity: Not on File (2020)    Received from [facility] [employer]    Physical Activity     Physical Activity: 0   Stress: No Stress Concern Present (2024)    [employer] [facility] [employer] - Occupational Stress Questionnaire     Feeling of Stress : Not at all   Social Connections: Not on File (2020)    Received from [facility] [employer]    Social Connections     Social Connections and Isolation: 0   Housing Stability: Not on File (2020)    Received from [facility]    Housing Stability     Housin       Family History  Family History   Problem Relation Name Age of Onset    Other (abdominal aneurysm) Mother      Heart attack Mother      Alzheimer's disease Father      Breast cancer Sister 50     Transient ischemic attack Sister 50     Breast cancer Sibling      Diabetes Maternal Cousin      Breast cancer Other mat relatives     Allergies Other           Allergies  Allergies   Allergen Reactions    Ciprofloxacin Hcl Itching       Review of Systems  REVIEW OF SYSTEMS  Constitutional: no unplanned weight loss  Psychiatric: no suicidal ideation  ENT: no vision changes, no sinus problems  Pulmonary: no shortness of breath  Lymphatic: no enlarged lymph nodes  Cardiovascular: no chest pain or shortness of breath  Gastrointestinal: no stomach problems  Genitourinary: no dysuria   Skin: no rashes  Endocrine: no thyroid problems  Neurological: no headache, no numbness  Hematological: no easy bruising  Musculoskeletal: Left heel pain     Physical Exam  PHYSICAL EXAMINATION  Constitutional Exam: well developed and well nourished  Psychiatric Exam: alert and oriented, appropriate mood and behavior  Eye Exam: EOMI  Pulmonary Exam: breathing non-labored, no apparent distress  Lymphatic exam: no appreciable lymphadenopathy in the lower extremities  Cardiovascular exam: RRR to peripheral palpation, DP pulses 2+, PT 2+, toes are pink with good capillary refill, no pitting edema  Skin exam: no open lesions, rashes, abrasions or ulcerations  Neurological exam: sensation to light touch intact in both lower extremities in peripheral and dermatomal distributions (except for any abnormalities noted in musculoskeletal exam)    Musculoskeletal exam: Left lower extremity examination.  Patient is nontender to palpation at the plantar aspect of the heel.  No tenderness to palpation along the medial longitudinal band of the plantar fascia. Patient has relatively restricted but pain-free subtalar and midtarsal joint range of motion.  Patient has greater than physiologic hindfoot valgus with pes planus arch posture and positive to many toes sign. Patient has sensation intact to light touch grossly in a saphenous, sural, superficial peroneal, deep peroneal and tibial nerve distribution.  Patient has 5 out of 5 strength in plantarflexion, dorsiflexion and EHL. Patient has 2+ DP/PT pulse palpated.   Patient has negative calcaneal compression test.     Last Recorded Vitals  There were no vitals taken for this visit.    Laboratory Results  No results found. However, due to the size of the patient record, not all encounters were searched. Please check Results Review for a complete set of results.     Radiology Results  No new imaging at this visit.    Assessment/Plan:  64-year-old female who my impression has resolved left foot pain likely secondary to calcaneus stress injury in the setting of progressive collapsing foot deformity with features of rigid pes planus posture and TN/ST OA.  I have reviewed the diagnosis and treatment options extensively with the patient.  I would recommend the patient continue weightbearing to her tolerance in her left lower extremity.  I would no formal restrictions for her and she may continue with her orthotics.  I discussed with the patient that she has an arthritic flatfoot that we can continue to manage expectantly, I counseled her regarding worsening pain as well as foot shape that she should contact the office to schedule an appointment.  I would otherwise plan to see the patient back on an as-needed basis.  Upon return, patient would not require further imaging the symptoms were performed.    Shekhar Cox MD, GABRIELLE  Department of Orthopaedic Surgery  [facility]    The diagnosis and treatment plan were reviewed with the patient. All questions were answered. The patient verbalized understanding of the treatment plan. There were no barriers to understanding identified.    Note dictated with [employer] software.  Completed without full type editing and sent to avoid delay.

## 2025-01-02 ENCOUNTER — APPOINTMENT (OUTPATIENT)
Dept: ENDOCRINOLOGY | Facility: CLINIC | Age: 65
End: 2025-01-02
Payer: COMMERCIAL

## 2025-01-02 VITALS
HEIGHT: 66 IN | RESPIRATION RATE: 16 BRPM | HEART RATE: 84 BPM | DIASTOLIC BLOOD PRESSURE: 84 MMHG | WEIGHT: 233 LBS | BODY MASS INDEX: 37.45 KG/M2 | SYSTOLIC BLOOD PRESSURE: 132 MMHG

## 2025-01-02 DIAGNOSIS — E23.6 EMPTY SELLA (MULTI): Primary | ICD-10-CM

## 2025-01-02 PROBLEM — R30.0 DYSURIA: Status: RESOLVED | Noted: 2023-10-17 | Resolved: 2025-01-02

## 2025-01-02 PROBLEM — F17.200 NICOTINE DEPENDENCE: Status: RESOLVED | Noted: 2023-10-17 | Resolved: 2025-01-02

## 2025-01-02 PROCEDURE — 1160F RVW MEDS BY RX/DR IN RCRD: CPT | Performed by: INTERNAL MEDICINE

## 2025-01-02 PROCEDURE — 3075F SYST BP GE 130 - 139MM HG: CPT | Performed by: INTERNAL MEDICINE

## 2025-01-02 PROCEDURE — 3008F BODY MASS INDEX DOCD: CPT | Performed by: INTERNAL MEDICINE

## 2025-01-02 PROCEDURE — 1036F TOBACCO NON-USER: CPT | Performed by: INTERNAL MEDICINE

## 2025-01-02 PROCEDURE — 1159F MED LIST DOCD IN RCRD: CPT | Performed by: INTERNAL MEDICINE

## 2025-01-02 PROCEDURE — 99204 OFFICE O/P NEW MOD 45 MIN: CPT | Performed by: INTERNAL MEDICINE

## 2025-01-02 PROCEDURE — 3079F DIAST BP 80-89 MM HG: CPT | Performed by: INTERNAL MEDICINE

## 2025-01-02 RX ORDER — LANCETS 33 GAUGE
EACH MISCELLANEOUS
COMMUNITY
Start: 2024-11-11

## 2025-01-02 RX ORDER — BLOOD-GLUCOSE METER
EACH MISCELLANEOUS
COMMUNITY
Start: 2024-11-09

## 2025-01-02 ASSESSMENT — ENCOUNTER SYMPTOMS
DIARRHEA: 0
PALPITATIONS: 0
CHILLS: 0
COUGH: 0
FEVER: 0
NAUSEA: 0
VOMITING: 0
FATIGUE: 0
HEADACHES: 0
SHORTNESS OF BREATH: 0

## 2025-01-02 NOTE — PROGRESS NOTES
Endocrinology New Patient Consult  Subjective   Patient ID: Isabelle Abel is a 64 y.o. female who presents for empty sella syndrome. Patient was referred by Dr. Armando.     PCP: Kristina Tellez MD    HPI  64-year-old referred by Dr. Armando for evaluation of empty sella seen on MRI done for vertigo and hearing loss.  Patient states she has had vertigo on and off for many years and recently established with ENT.  She was sent for an MRI that noted a incidental empty sella.  On reflection of this she states that in her early 30s she was advised of the same finding by a neurologist who she was seeing after a accident at work.  Beyond the vertigo she has been feeling okay.  She has not had any visual issues She has gained a bit of weight recently.  She denies any history of hormonal issues.  She is excited about retiring soon    Review of Systems   Constitutional:  Negative for chills, fatigue and fever.   Respiratory:  Negative for cough and shortness of breath.    Cardiovascular:  Negative for chest pain and palpitations.   Gastrointestinal:  Negative for diarrhea, nausea and vomiting.   Neurological:  Negative for headaches.       Patient Active Problem List   Diagnosis    Abnormal blood level of uric acid    Abnormal blood sugar    Abnormal mammogram of left breast    Chronic sinusitis    COPD (chronic obstructive pulmonary disease) (Multi)    Arthritis of left hip    Atherosclerosis of native coronary artery of native heart without angina pectoris    Atypical lobular hyperplasia of breast    Avascular necrosis of bone of left hip (Multi)    Avascular necrosis of proximal end of right femur (Multi)    Benign essential hypertension    Chronic diastolic congestive heart failure    Chronic pain of right ankle    Diverticulitis of rectosigmoid    Diverticulosis    Vertigo    Eczema    Elevated serum creatinine    Gout    Gross hematuria    Hematuria    Hypercholesterolemia    Hyperlipemia    Lumbar radiculopathy     Lung nodule    NSTEMI, initial episode of care (Multi)    Obesity (BMI 30-39.9)    ADILSON (obstructive sleep apnea)    Polyp of sigmoid colon    Postmenopausal osteoporosis    Shortness of breath on exertion    Stress    Trochanteric bursitis of left hip    Type 2 diabetes mellitus    Uric acid arthropathy    Urine frequency    Vitamin D deficiency    Steatorrhea ([facility])    Mild aortic insufficiency    Past myocardial infarction    Pelvic pain    Bronchitis    Need for vaccination    Dizziness    Imbalance    Lower abdominal pain    Encounter for annual physical exam    Right foot pain    Right hip pain    Plantar fasciitis of left foot    Numbness of right anterior thigh       Past Medical History:   Diagnosis Date    Asthma     Encounter for follow-up examination after completed treatment for conditions other than malignant neoplasm 2021    Hospital discharge follow-up    Encounter for gynecological examination (general) (routine) without abnormal findings 2019    Encounter for annual routine gynecological examination    Hypertension     Non-ST elevation (NSTEMI) myocardial infarction (Multi) 2022    Non-ST elevation myocardial infarction (NSTEMI) in recovery phase    Personal history of other diseases of the respiratory system 2018    History of chronic obstructive lung disease    Zoster without complications 10/14/2020    Herpes zoster without complication        Past Surgical History:   Procedure Laterality Date    BREAST BIOPSY      OTHER SURGICAL HISTORY  04/10/2014    Uterine Surgery    OTHER SURGICAL HISTORY  2019    Dilation and curettage    OTHER SURGICAL HISTORY  2019    Surgically induced     TOTAL HIP ARTHROPLASTY  2018    Hip Replacement        Social History     Tobacco Use   Smoking Status Former    Types: Cigarettes   Smokeless Tobacco Never      Social History     Substance and Sexual Activity   Alcohol Use Not Currently      Marital status:    Employment: [employer]    Family History   Problem Relation Name Age of Onset    Other (abdominal aneurysm) Mother      Heart attack Mother      Alzheimer's disease Father      Breast cancer Sister 50     Transient ischemic attack Sister 50     Breast cancer Sibling      Diabetes Maternal Cousin      Breast cancer Other mat relatives     Allergies Other          Home Meds:  Current Outpatient Medications   Medication Instructions    albuterol 90 mcg/actuation inhaler 1-2 puffs, inhalation, Every 4 hours PRN    allopurinol (ZYLOPRIM) 300 mg, oral, Daily    amLODIPine (NORVASC) 2.5 mg, oral, Daily    aspirin 81 mg, oral, Daily    atorvastatin (LIPITOR) 80 mg, oral, Daily    azelastine (Astelin) 137 mcg (0.1 %) nasal spray 1 spray, Each Nostril, 2 times daily    blood-glucose meter (OneTouch Ultra2 Meter) misc Use to check glucose levels twice a day    clotrimazole-betamethasone (Lotrisone) cream 1 Application, 2 times daily    dorzolamide (Trusopt) 2 % ophthalmic solution No dose, route, or frequency recorded.    fluticasone-umeclidin-vilanter (Trelegy Ellipta) 200-62.5-25 mcg blister with device 1 puff, inhalation, Daily    ipratropium-albuteroL (Duo-Neb) 0.5-2.5 mg/3 mL nebulizer solution 3 mL, Every 6 hours PRN    lancets (OneTouch Delica Plus Lancet) 30 gauge misc 2 Lancets, miscellaneous, Daily    meclizine (ANTIVERT) 25 mg, oral, 3 times daily PRN    meloxicam (Mobic) 7.5 mg tablet     metFORMIN (GLUCOPHAGE) 500 mg, oral, Daily with breakfast    nitroglycerin (NITROSTAT) 0.4 mg, Every 5 min PRN    OneTouch Delica Plus Lancet 33 gauge misc use 2 lancets daily    OneTouch Verio test strips strip use 2 strips daily    triamcinolone (Kenalog) 0.1 % cream 2 times daily        Allergies   Allergen Reactions    Ciprofloxacin Hcl Itching        Objective   Vitals:    01/02/25 1446   BP: 132/84   Pulse: 84   Resp: 16      Vitals:    01/02/25 1446   Weight: 106 kg (233 lb)      Body mass index is 38.18  "kg/m².   Physical Exam  Constitutional:       Appearance: Normal appearance. She is overweight.   HENT:      Head: Normocephalic and atraumatic.   Neck:      Thyroid: No thyroid mass, thyromegaly or thyroid tenderness.   Cardiovascular:      Rate and Rhythm: Normal rate and regular rhythm.      Heart sounds: No murmur heard.     No gallop.   Pulmonary:      Effort: Pulmonary effort is normal.      Breath sounds: Normal breath sounds.   Abdominal:      Palpations: Abdomen is soft.      Comments: benign   Neurological:      General: No focal deficit present.      Mental Status: She is alert and oriented to person, place, and time.      Deep Tendon Reflexes: Reflexes are normal and symmetric.   Psychiatric:         Behavior: Behavior is cooperative.         Labs:  Lab Results   Component Value Date    HGBA1C 6.1 07/22/2024    TSH 1.41 02/23/2023      No results found for: \"PR1\", \"THYROIDPAB\", \"TSI\"     Assessment/Plan   Assessment & Plan  Empty sella (Multi)  64-year-old here for evaluation of empty sella.  We discussed her course.  We reviewed her MRI findings.  We discussed that empty sella is often an incidental finding.  In the small minority of patients it can be a sequela of previous pituitary pathology that is now resolved.  By her recollection it is.  She has had this for quite some time and therefore is likely an incidental finding.  I would recommend basic morning pituitary labs including a full thyroid panel prolactin and cortisol testing.  If hormonal blood work is normal no further follow-up would be needed.  I encouraged her to call or message with any concerns or questions  Orders:    Triiodothyronine, Free; Future    Thyroid Stimulating Hormone; Future    Thyroxine, Free; Future    Cortisol AM; Future    Prolactin; Future      Electronically signed by:  Kalyn Ingram MD 01/02/25  2:47 PM    "

## 2025-01-02 NOTE — LETTER
January 2, 2025     Laura Armando MD  [address]  [facility] [address]  [facility] [address]    Patient: Isabelle Abel   YOB: 1960   Date of Visit: 1/2/2025       Dear Dr. Laura Armando MD:    Thank you for referring Isabelle Abel to me for evaluation. Below are my notes for this consultation.  If you have questions, please do not hesitate to call me. I look forward to following your patient along with you.       Sincerely,     Kalyn Ingram MD      CC: No Recipients  ______________________________________________________________________________________    Endocrinology New Patient Consult  Subjective  Patient ID: Isabelle Abel is a 64 y.o. female who presents for empty sella syndrome. Patient was referred by Dr. Armando.     PCP: Kristina Tellez MD    HPI  64-year-old referred by Dr. Armando for evaluation of empty sella seen on MRI done for vertigo and hearing loss.  Patient states she has had vertigo on and off for many years and recently established with ENT.  She was sent for an MRI that noted a incidental empty sella.  On reflection of this she states that in her early 30s she was advised of the same finding by a neurologist who she was seeing after a accident at work.  Beyond the vertigo she has been feeling okay.  She has not had any visual issues She has gained a bit of weight recently.  She denies any history of hormonal issues.  She is excited about retiring soon    Review of Systems   Constitutional:  Negative for chills, fatigue and fever.   Respiratory:  Negative for cough and shortness of breath.    Cardiovascular:  Negative for chest pain and palpitations.   Gastrointestinal:  Negative for diarrhea, nausea and vomiting.   Neurological:  Negative for headaches.       Patient Active Problem List   Diagnosis   • Abnormal blood level of uric acid   • Abnormal blood sugar   • Abnormal mammogram of left breast   • Chronic sinusitis   • COPD (chronic obstructive pulmonary disease)  (Multi)   • Arthritis of left hip   • Atherosclerosis of native coronary artery of native heart without angina pectoris   • Atypical lobular hyperplasia of breast   • Avascular necrosis of bone of left hip (Multi)   • Avascular necrosis of proximal end of right femur (Multi)   • Benign essential hypertension   • Chronic diastolic congestive heart failure   • Chronic pain of right ankle   • Diverticulitis of rectosigmoid   • Diverticulosis   • Vertigo   • Eczema   • Elevated serum creatinine   • Gout   • Gross hematuria   • Hematuria   • Hypercholesterolemia   • Hyperlipemia   • Lumbar radiculopathy   • Lung nodule   • NSTEMI, initial episode of care (Multi)   • Obesity (BMI 30-39.9)   • ADILSON (obstructive sleep apnea)   • Polyp of sigmoid colon   • Postmenopausal osteoporosis   • Shortness of breath on exertion   • Stress   • Trochanteric bursitis of left hip   • Type 2 diabetes mellitus   • Uric acid arthropathy   • Urine frequency   • Vitamin D deficiency   • Steatorrhea ([facility])   • Mild aortic insufficiency   • Past myocardial infarction   • Pelvic pain   • Bronchitis   • Need for vaccination   • Dizziness   • Imbalance   • Lower abdominal pain   • Encounter for annual physical exam   • Right foot pain   • Right hip pain   • Plantar fasciitis of left foot   • Numbness of right anterior thigh       Past Medical History:   Diagnosis Date   • Asthma    • Encounter for follow-up examination after completed treatment for conditions other than malignant neoplasm 05/20/2021    Hospital discharge follow-up   • Encounter for gynecological examination (general) (routine) without abnormal findings 03/11/2019    Encounter for annual routine gynecological examination   • Hypertension    • Non-ST elevation (NSTEMI) myocardial infarction (Multi) 02/28/2022    Non-ST elevation myocardial infarction (NSTEMI) in recovery phase   • Personal history of other diseases of the respiratory system 05/19/2018    History of chronic  obstructive lung disease   • Zoster without complications 10/14/2020    Herpes zoster without complication        Past Surgical History:   Procedure Laterality Date   • BREAST BIOPSY     • OTHER SURGICAL HISTORY  04/10/2014    Uterine Surgery   • OTHER SURGICAL HISTORY  2019    Dilation and curettage   • OTHER SURGICAL HISTORY  2019    Surgically induced    • TOTAL HIP ARTHROPLASTY  2018    Hip Replacement        Social History     Tobacco Use   Smoking Status Former   • Types: Cigarettes   Smokeless Tobacco Never      Social History     Substance and Sexual Activity   Alcohol Use Not Currently      Marital status:   Employment: [employer]    Family History   Problem Relation Name Age of Onset   • Other (abdominal aneurysm) Mother     • Heart attack Mother     • Alzheimer's disease Father     • Breast cancer Sister 50    • Transient ischemic attack Sister 50    • Breast cancer Sibling     • Diabetes Maternal Cousin     • Breast cancer Other mat relatives    • Allergies Other          Home Meds:  Current Outpatient Medications   Medication Instructions   • albuterol 90 mcg/actuation inhaler 1-2 puffs, inhalation, Every 4 hours PRN   • allopurinol (ZYLOPRIM) 300 mg, oral, Daily   • amLODIPine (NORVASC) 2.5 mg, oral, Daily   • aspirin 81 mg, oral, Daily   • atorvastatin (LIPITOR) 80 mg, oral, Daily   • azelastine (Astelin) 137 mcg (0.1 %) nasal spray 1 spray, Each Nostril, 2 times daily   • blood-glucose meter (OneTouch Ultra2 Meter) misc Use to check glucose levels twice a day   • clotrimazole-betamethasone (Lotrisone) cream 1 Application, 2 times daily   • dorzolamide (Trusopt) 2 % ophthalmic solution No dose, route, or frequency recorded.   • fluticasone-umeclidin-vilanter (Trelegy Ellipta) 200-62.5-25 mcg blister with device 1 puff, inhalation, Daily   • ipratropium-albuteroL (Duo-Neb) 0.5-2.5 mg/3 mL nebulizer solution 3 mL, Every 6 hours PRN   • lancets (OneTouch Delica  "Plus Lancet) 30 gauge misc 2 Lancets, miscellaneous, Daily   • meclizine (ANTIVERT) 25 mg, oral, 3 times daily PRN   • meloxicam (Mobic) 7.5 mg tablet    • metFORMIN (GLUCOPHAGE) 500 mg, oral, Daily with breakfast   • nitroglycerin (NITROSTAT) 0.4 mg, Every 5 min PRN   • OneTouch Delica Plus Lancet 33 gauge misc use 2 lancets daily   • OneTouch Verio test strips strip use 2 strips daily   • triamcinolone (Kenalog) 0.1 % cream 2 times daily        Allergies   Allergen Reactions   • Ciprofloxacin Hcl Itching        Objective  Vitals:    01/02/25 1446   BP: 132/84   Pulse: 84   Resp: 16      Vitals:    01/02/25 1446   Weight: 106 kg (233 lb)      Body mass index is 38.18 kg/m².   Physical Exam  Constitutional:       Appearance: Normal appearance. She is overweight.   HENT:      Head: Normocephalic and atraumatic.   Neck:      Thyroid: No thyroid mass, thyromegaly or thyroid tenderness.   Cardiovascular:      Rate and Rhythm: Normal rate and regular rhythm.      Heart sounds: No murmur heard.     No gallop.   Pulmonary:      Effort: Pulmonary effort is normal.      Breath sounds: Normal breath sounds.   Abdominal:      Palpations: Abdomen is soft.      Comments: benign   Neurological:      General: No focal deficit present.      Mental Status: She is alert and oriented to person, place, and time.      Deep Tendon Reflexes: Reflexes are normal and symmetric.   Psychiatric:         Behavior: Behavior is cooperative.         Labs:  Lab Results   Component Value Date    HGBA1C 6.1 07/22/2024    TSH 1.41 02/23/2023      No results found for: \"PR1\", \"THYROIDPAB\", \"TSI\"     Assessment/Plan  Assessment & Plan  Empty sella (Multi)  64-year-old here for evaluation of empty sella.  We discussed her course.  We reviewed her MRI findings.  We discussed that empty sella is often an incidental finding.  In the small minority of patients it can be a sequela of previous pituitary pathology that is now resolved.  By her recollection it " is.  She has had this for quite some time and therefore is likely an incidental finding.  I would recommend basic morning pituitary labs including a full thyroid panel prolactin and cortisol testing.  If hormonal blood work is normal no further follow-up would be needed.  I encouraged her to call or message with any concerns or questions  Orders:  •  Triiodothyronine, Free; Future  •  Thyroid Stimulating Hormone; Future  •  Thyroxine, Free; Future  •  Cortisol AM; Future  •  Prolactin; Future      Electronically signed by:  Kalyn Ingram MD 01/02/25  2:47 PM

## 2025-01-02 NOTE — PATIENT INSTRUCTIONS
Morning blood work in the near future  Further plans based on results  Please call or message with concerns or questions

## 2025-01-23 ENCOUNTER — APPOINTMENT (OUTPATIENT)
Dept: PRIMARY CARE | Facility: CLINIC | Age: 65
End: 2025-01-23
Payer: COMMERCIAL

## 2025-01-23 VITALS
RESPIRATION RATE: 18 BRPM | HEART RATE: 80 BPM | SYSTOLIC BLOOD PRESSURE: 127 MMHG | WEIGHT: 230.82 LBS | BODY MASS INDEX: 37.83 KG/M2 | OXYGEN SATURATION: 95 % | DIASTOLIC BLOOD PRESSURE: 81 MMHG

## 2025-01-23 DIAGNOSIS — J44.9 CHRONIC OBSTRUCTIVE PULMONARY DISEASE, UNSPECIFIED COPD TYPE (MULTI): ICD-10-CM

## 2025-01-23 DIAGNOSIS — E78.00 HYPERCHOLESTEROLEMIA: ICD-10-CM

## 2025-01-23 DIAGNOSIS — Z00.00 INITIAL MEDICARE ANNUAL WELLNESS VISIT: Primary | ICD-10-CM

## 2025-01-23 DIAGNOSIS — M81.0 POSTMENOPAUSAL OSTEOPOROSIS: ICD-10-CM

## 2025-01-23 DIAGNOSIS — K57.32 DIVERTICULITIS OF RECTOSIGMOID: ICD-10-CM

## 2025-01-23 DIAGNOSIS — I10 BENIGN ESSENTIAL HYPERTENSION: ICD-10-CM

## 2025-01-23 DIAGNOSIS — M1A.9XX0 CHRONIC GOUT INVOLVING TOE WITHOUT TOPHUS, UNSPECIFIED CAUSE, UNSPECIFIED LATERALITY: ICD-10-CM

## 2025-01-23 DIAGNOSIS — Z12.31 ENCOUNTER FOR SCREENING MAMMOGRAM FOR BREAST CANCER: ICD-10-CM

## 2025-01-23 DIAGNOSIS — E11.9 TYPE 2 DIABETES MELLITUS WITHOUT COMPLICATION, WITHOUT LONG-TERM CURRENT USE OF INSULIN (MULTI): ICD-10-CM

## 2025-01-23 PROCEDURE — 1160F RVW MEDS BY RX/DR IN RCRD: CPT | Performed by: INTERNAL MEDICINE

## 2025-01-23 PROCEDURE — 1126F AMNT PAIN NOTED NONE PRSNT: CPT | Performed by: INTERNAL MEDICINE

## 2025-01-23 PROCEDURE — 1036F TOBACCO NON-USER: CPT | Performed by: INTERNAL MEDICINE

## 2025-01-23 PROCEDURE — G0438 PPPS, INITIAL VISIT: HCPCS | Performed by: INTERNAL MEDICINE

## 2025-01-23 PROCEDURE — 3074F SYST BP LT 130 MM HG: CPT | Performed by: INTERNAL MEDICINE

## 2025-01-23 PROCEDURE — 1159F MED LIST DOCD IN RCRD: CPT | Performed by: INTERNAL MEDICINE

## 2025-01-23 PROCEDURE — 93000 ELECTROCARDIOGRAM COMPLETE: CPT | Performed by: INTERNAL MEDICINE

## 2025-01-23 PROCEDURE — 1170F FXNL STATUS ASSESSED: CPT | Performed by: INTERNAL MEDICINE

## 2025-01-23 PROCEDURE — 3079F DIAST BP 80-89 MM HG: CPT | Performed by: INTERNAL MEDICINE

## 2025-01-23 RX ORDER — SODIUM, POTASSIUM,MAG SULFATES 17.5-3.13G
SOLUTION, RECONSTITUTED, ORAL ORAL
Qty: 1 EACH | Refills: 0 | Status: SHIPPED | OUTPATIENT
Start: 2025-01-23

## 2025-01-23 ASSESSMENT — ENCOUNTER SYMPTOMS
FATIGUE: 0
COUGH: 0
DIAPHORESIS: 0
APPETITE CHANGE: 0
ARTHRALGIAS: 0
NUMBNESS: 0
PHOTOPHOBIA: 0
CHILLS: 0
DYSURIA: 0
POLYPHAGIA: 0
STRIDOR: 0
DEPRESSION: 0
HEMATURIA: 0
ADENOPATHY: 0
BRUISES/BLEEDS EASILY: 0
HEADACHES: 0
FLANK PAIN: 0
WEAKNESS: 0
OCCASIONAL FEELINGS OF UNSTEADINESS: 1
SEIZURES: 0
TREMORS: 0
PALPITATIONS: 0
BACK PAIN: 0
LOSS OF SENSATION IN FEET: 0
SPEECH DIFFICULTY: 0
DIZZINESS: 1

## 2025-01-23 ASSESSMENT — ACTIVITIES OF DAILY LIVING (ADL)
DRESSING: INDEPENDENT
MANAGING_FINANCES: INDEPENDENT
DOING_HOUSEWORK: INDEPENDENT
BATHING: INDEPENDENT
GROCERY_SHOPPING: INDEPENDENT
TAKING_MEDICATION: INDEPENDENT

## 2025-01-23 ASSESSMENT — PAIN SCALES - GENERAL: PAINLEVEL_OUTOF10: 0-NO PAIN

## 2025-01-23 NOTE — PROGRESS NOTES
Subjective   Patient ID: Isabelle Abel is a 64 y.o. female who presents for Welcome To Medicare.    [facility] to Medicare visit.  She has diabetes type 2, hypercholesterolemia, COPD, obstructive sleep apnea, coronary artery disease, gout, vitamin D deficiency.  She was taking amlodipine for blood pressure complained of side effects and was discontinued.  BMI 37.         Review of Systems   Constitutional:  Negative for appetite change, chills, diaphoresis and fatigue.   HENT:  Negative for congestion, ear discharge, hearing loss, mouth sores and postnasal drip.    Eyes:  Negative for photophobia and visual disturbance.   Respiratory:  Negative for cough and stridor.    Cardiovascular:  Negative for palpitations and leg swelling.   Endocrine: Negative for cold intolerance, heat intolerance, polyphagia and polyuria.   Genitourinary:  Negative for decreased urine volume, dysuria, enuresis, flank pain and hematuria.   Musculoskeletal:  Negative for arthralgias, back pain and gait problem.   Allergic/Immunologic: Negative for food allergies and immunocompromised state.   Neurological:  Positive for dizziness. Negative for tremors, seizures, syncope, speech difficulty, weakness, numbness and headaches.   Hematological:  Negative for adenopathy. Does not bruise/bleed easily.   All other systems reviewed and are negative.      Objective   /81 (BP Location: Left arm, Patient Position: Sitting)   Pulse 80   Resp 18   Wt 105 kg (230 lb 13.2 oz)   SpO2 95%   BMI 37.83 kg/m²     Physical Exam  Constitutional:       Appearance: Normal appearance. She is obese.   HENT:      Head: Normocephalic and atraumatic.      Right Ear: External ear normal.      Left Ear: External ear normal.      Mouth/Throat:      Mouth: Mucous membranes are moist.      Pharynx: Oropharynx is clear.   Neck:      Vascular: No carotid bruit.   Cardiovascular:      Rate and Rhythm: Normal rate and regular rhythm.      Pulses:           Dorsalis pedis  pulses are 3+ on the right side and 3+ on the left side.        Posterior tibial pulses are 3+ on the right side and 3+ on the left side.      Heart sounds: No murmur heard.     No gallop.   Pulmonary:      Effort: Pulmonary effort is normal. No respiratory distress.      Breath sounds: No wheezing or rales.   Abdominal:      General: Abdomen is flat.      Palpations: Abdomen is soft.      Hernia: No hernia is present.   Musculoskeletal:         General: No swelling, tenderness or signs of injury.      Cervical back: No rigidity or tenderness.      Right lower leg: No edema.      Right foot: Deformity present.      Left foot: Deformity present.   Feet:      Right foot:      Protective Sensation: 10 sites tested.  10 sites sensed.      Skin integrity: Dry skin present.      Toenail Condition: Right toenails are normal.      Left foot:      Protective Sensation: 10 sites tested.  10 sites sensed.      Skin integrity: Dry skin present.      Toenail Condition: Left toenails are normal.   Lymphadenopathy:      Cervical: No cervical adenopathy.   Skin:     Coloration: Skin is not jaundiced or pale.      Findings: No bruising, erythema, lesion or rash.   Neurological:      General: No focal deficit present.      Mental Status: She is oriented to person, place, and time.      Motor: No weakness.      Coordination: Coordination normal.   Psychiatric:         Mood and Affect: Mood normal.         Behavior: Behavior normal.         Assessment/Plan   Problem List Items Addressed This Visit             ICD-10-CM    Benign essential hypertension I10     Blood pressure stable on no antihypertensive medications.  Advised to follow low-sodium diet do not exceed 200 mg per serving.         Relevant Orders    Comprehensive Metabolic Panel    Gout M10.9    Relevant Orders    Uric Acid    Hypercholesterolemia E78.00     Hypercholesterolemia: check lipid profile.   Educate low cholesterol diet , avoid fast foods , processed meats and  fried foods, red meat.  Increase physical activity.  Keep a low carb diet.              Relevant Orders    Lipid Panel    Postmenopausal osteoporosis M81.0    Relevant Orders    XR DEXA bone density    Type 2 diabetes mellitus E11.9    Relevant Orders    Hemoglobin A1C    Initial Medicare annual wellness visit - Primary Z00.00     Advised to have Adacel vaccine.  She declines influenza vaccine.  She does not have a living will.  Screening mammogram July 2024.  She is scheduled to have screening colonoscopy this year.  Scheduled to have bone density scan.  Has had normal Pap smears before age of 63.  Discussed weight loss, follow healthy diet avoid fried foods fast food processed meats concentrated sweets.  Increase physical activity is much as possible.  Have fasting blood work.         Relevant Orders    ECG 12 Lead (Completed)     Other Visit Diagnoses         Codes    Encounter for screening mammogram for breast cancer     Z12.31    Relevant Orders    BI mammo bilateral screening tomosynthesis

## 2025-01-23 NOTE — ASSESSMENT & PLAN NOTE
Blood pressure stable on no antihypertensive medications.  Advised to follow low-sodium diet do not exceed 200 mg per serving.

## 2025-01-23 NOTE — ASSESSMENT & PLAN NOTE
Advised to have Adacel vaccine.  She declines influenza vaccine.  She does not have a living will.  Screening mammogram July 2024.  She is scheduled to have screening colonoscopy this year.  Scheduled to have bone density scan.  Has had normal Pap smears before age of 63.  Discussed weight loss, follow healthy diet avoid fried foods fast food processed meats concentrated sweets.  Increase physical activity is much as possible.  Have fasting blood work.

## 2025-01-24 ENCOUNTER — LAB (OUTPATIENT)
Dept: LAB | Facility: LAB | Age: 65
End: 2025-01-24
Payer: MEDICARE

## 2025-01-24 DIAGNOSIS — I10 BENIGN ESSENTIAL HYPERTENSION: ICD-10-CM

## 2025-01-24 DIAGNOSIS — E11.9 TYPE 2 DIABETES MELLITUS WITHOUT COMPLICATION, WITHOUT LONG-TERM CURRENT USE OF INSULIN (MULTI): ICD-10-CM

## 2025-01-24 DIAGNOSIS — E78.00 HYPERCHOLESTEROLEMIA: ICD-10-CM

## 2025-01-24 DIAGNOSIS — E23.6 EMPTY SELLA (MULTI): ICD-10-CM

## 2025-01-24 DIAGNOSIS — M1A.9XX0 CHRONIC GOUT INVOLVING TOE WITHOUT TOPHUS, UNSPECIFIED CAUSE, UNSPECIFIED LATERALITY: ICD-10-CM

## 2025-01-24 LAB
ALBUMIN SERPL BCP-MCNC: 4.1 G/DL (ref 3.4–5)
ALP SERPL-CCNC: 102 U/L (ref 33–136)
ALT SERPL W P-5'-P-CCNC: 13 U/L (ref 7–45)
ANION GAP SERPL CALC-SCNC: 13 MMOL/L (ref 10–20)
AST SERPL W P-5'-P-CCNC: 16 U/L (ref 9–39)
BILIRUB SERPL-MCNC: 0.8 MG/DL (ref 0–1.2)
BUN SERPL-MCNC: 19 MG/DL (ref 6–23)
CALCIUM SERPL-MCNC: 9.3 MG/DL (ref 8.6–10.3)
CHLORIDE SERPL-SCNC: 107 MMOL/L (ref 98–107)
CHOLEST SERPL-MCNC: 304 MG/DL (ref 0–199)
CHOLESTEROL/HDL RATIO: 6.8
CO2 SERPL-SCNC: 25 MMOL/L (ref 21–32)
CORTIS AM PEAK SERPL-MSCNC: 10 UG/DL (ref 5–20)
CREAT SERPL-MCNC: 1.23 MG/DL (ref 0.5–1.05)
EGFRCR SERPLBLD CKD-EPI 2021: 49 ML/MIN/1.73M*2
EST. AVERAGE GLUCOSE BLD GHB EST-MCNC: 131 MG/DL
GLUCOSE SERPL-MCNC: 96 MG/DL (ref 74–99)
HBA1C MFR BLD: 6.2 %
HDLC SERPL-MCNC: 44.8 MG/DL
LDLC SERPL CALC-MCNC: 220 MG/DL
NON HDL CHOLESTEROL: 259 MG/DL (ref 0–149)
POTASSIUM SERPL-SCNC: 4.9 MMOL/L (ref 3.5–5.3)
PROLACTIN SERPL-MCNC: 9.8 UG/L (ref 3–20)
PROT SERPL-MCNC: 7 G/DL (ref 6.4–8.2)
SODIUM SERPL-SCNC: 140 MMOL/L (ref 136–145)
T3FREE SERPL-MCNC: 2.8 PG/ML (ref 2.3–4.2)
T4 FREE SERPL-MCNC: 0.77 NG/DL (ref 0.61–1.12)
TRIGL SERPL-MCNC: 196 MG/DL (ref 0–149)
TSH SERPL-ACNC: 1.99 MIU/L (ref 0.44–3.98)
URATE SERPL-MCNC: 8.2 MG/DL (ref 2.3–6.7)
VLDL: 39 MG/DL (ref 0–40)

## 2025-01-24 PROCEDURE — 84439 ASSAY OF FREE THYROXINE: CPT

## 2025-01-24 PROCEDURE — 84146 ASSAY OF PROLACTIN: CPT

## 2025-01-24 PROCEDURE — 84481 FREE ASSAY (FT-3): CPT

## 2025-01-24 PROCEDURE — 84443 ASSAY THYROID STIM HORMONE: CPT

## 2025-01-24 PROCEDURE — 84550 ASSAY OF BLOOD/URIC ACID: CPT

## 2025-01-24 PROCEDURE — 80053 COMPREHEN METABOLIC PANEL: CPT

## 2025-01-24 PROCEDURE — 82533 TOTAL CORTISOL: CPT

## 2025-01-24 PROCEDURE — 80061 LIPID PANEL: CPT

## 2025-01-24 PROCEDURE — 83036 HEMOGLOBIN GLYCOSYLATED A1C: CPT

## 2025-01-26 DIAGNOSIS — M1A.9XX0 CHRONIC GOUT WITHOUT TOPHUS, UNSPECIFIED CAUSE, UNSPECIFIED SITE: ICD-10-CM

## 2025-01-26 DIAGNOSIS — I25.10 ATHEROSCLEROSIS OF NATIVE CORONARY ARTERY OF NATIVE HEART WITHOUT ANGINA PECTORIS: ICD-10-CM

## 2025-01-26 RX ORDER — ATORVASTATIN CALCIUM 80 MG/1
80 TABLET, FILM COATED ORAL DAILY
Qty: 90 TABLET | Refills: 1 | Status: SHIPPED | OUTPATIENT
Start: 2025-01-26 | End: 2025-07-25

## 2025-01-26 RX ORDER — ALLOPURINOL 300 MG/1
300 TABLET ORAL DAILY
Qty: 90 TABLET | Refills: 1 | Status: SHIPPED | OUTPATIENT
Start: 2025-01-26

## 2025-01-28 RX ORDER — FLUTICASONE FUROATE, UMECLIDINIUM BROMIDE AND VILANTEROL TRIFENATATE 200; 62.5; 25 UG/1; UG/1; UG/1
1 POWDER RESPIRATORY (INHALATION) DAILY
Qty: 30 EACH | Refills: 11 | Status: SHIPPED | OUTPATIENT
Start: 2025-01-28 | End: 2026-01-28

## 2025-01-28 RX ORDER — ALBUTEROL SULFATE 90 UG/1
1-2 INHALANT RESPIRATORY (INHALATION) EVERY 4 HOURS PRN
Qty: 18 G | Refills: 11 | Status: SHIPPED | OUTPATIENT
Start: 2025-01-28

## 2025-02-26 ENCOUNTER — TELEPHONE (OUTPATIENT)
Dept: PULMONOLOGY | Facility: HOSPITAL | Age: 65
End: 2025-02-26
Payer: COMMERCIAL

## 2025-03-05 ENCOUNTER — APPOINTMENT (OUTPATIENT)
Dept: GASTROENTEROLOGY | Facility: EXTERNAL LOCATION | Age: 65
End: 2025-03-05
Payer: MEDICARE

## 2025-03-05 DIAGNOSIS — Z12.11 ENCOUNTER FOR SCREENING COLONOSCOPY: ICD-10-CM

## 2025-03-05 DIAGNOSIS — Z86.0101 HISTORY OF ADENOMATOUS POLYP OF COLON: ICD-10-CM

## 2025-03-05 DIAGNOSIS — Z12.11 ENCOUNTER FOR SCREENING COLONOSCOPY: Primary | ICD-10-CM

## 2025-03-05 PROCEDURE — 45378 DIAGNOSTIC COLONOSCOPY: CPT | Performed by: INTERNAL MEDICINE

## 2025-03-05 PROCEDURE — 3044F HG A1C LEVEL LT 7.0%: CPT | Performed by: INTERNAL MEDICINE

## 2025-03-05 PROCEDURE — 3050F LDL-C >= 130 MG/DL: CPT | Performed by: INTERNAL MEDICINE

## 2025-03-06 ENCOUNTER — APPOINTMENT (OUTPATIENT)
Dept: AUDIOLOGY | Facility: CLINIC | Age: 65
End: 2025-03-06
Payer: COMMERCIAL

## 2025-03-06 ENCOUNTER — APPOINTMENT (OUTPATIENT)
Dept: OTOLARYNGOLOGY | Facility: CLINIC | Age: 65
End: 2025-03-06
Payer: COMMERCIAL

## 2025-05-05 DIAGNOSIS — J44.9 CHRONIC OBSTRUCTIVE PULMONARY DISEASE, UNSPECIFIED COPD TYPE (MULTI): ICD-10-CM

## 2025-05-08 RX ORDER — FLUTICASONE FUROATE, UMECLIDINIUM BROMIDE AND VILANTEROL TRIFENATATE 200; 62.5; 25 UG/1; UG/1; UG/1
1 POWDER RESPIRATORY (INHALATION) DAILY
Qty: 30 EACH | Refills: 5 | Status: SHIPPED | OUTPATIENT
Start: 2025-05-08 | End: 2026-05-08

## 2025-05-27 ENCOUNTER — APPOINTMENT (OUTPATIENT)
Dept: OTOLARYNGOLOGY | Facility: CLINIC | Age: 65
End: 2025-05-27
Payer: MEDICARE

## 2025-05-27 ENCOUNTER — APPOINTMENT (OUTPATIENT)
Dept: AUDIOLOGY | Facility: CLINIC | Age: 65
End: 2025-05-27
Payer: COMMERCIAL

## 2025-05-27 VITALS — HEIGHT: 66 IN | WEIGHT: 230 LBS | BODY MASS INDEX: 36.96 KG/M2

## 2025-05-27 DIAGNOSIS — H90.42 SENSORINEURAL HEARING LOSS (SNHL) OF LEFT EAR WITH UNRESTRICTED HEARING OF RIGHT EAR: ICD-10-CM

## 2025-05-27 DIAGNOSIS — H90.3 ASNHL (ASYMMETRICAL SENSORINEURAL HEARING LOSS): Primary | ICD-10-CM

## 2025-05-27 DIAGNOSIS — E23.6 EMPTY SELLA SYNDROME (MULTI): ICD-10-CM

## 2025-05-27 DIAGNOSIS — G47.33 OBSTRUCTIVE SLEEP APNEA: ICD-10-CM

## 2025-05-27 DIAGNOSIS — H81.12 BENIGN PAROXYSMAL POSITIONAL VERTIGO, LEFT: ICD-10-CM

## 2025-05-27 DIAGNOSIS — R42 DIZZINESS: ICD-10-CM

## 2025-05-27 PROCEDURE — 95992 CANALITH REPOSITIONING PROC: CPT | Performed by: OTOLARYNGOLOGY

## 2025-05-27 PROCEDURE — 1036F TOBACCO NON-USER: CPT | Performed by: OTOLARYNGOLOGY

## 2025-05-27 PROCEDURE — 3008F BODY MASS INDEX DOCD: CPT | Performed by: OTOLARYNGOLOGY

## 2025-05-27 PROCEDURE — 3044F HG A1C LEVEL LT 7.0%: CPT | Performed by: OTOLARYNGOLOGY

## 2025-05-27 PROCEDURE — 99212 OFFICE O/P EST SF 10 MIN: CPT | Performed by: OTOLARYNGOLOGY

## 2025-05-27 PROCEDURE — 3050F LDL-C >= 130 MG/DL: CPT | Performed by: OTOLARYNGOLOGY

## 2025-05-27 PROCEDURE — 1159F MED LIST DOCD IN RCRD: CPT | Performed by: OTOLARYNGOLOGY

## 2025-05-27 PROCEDURE — 1160F RVW MEDS BY RX/DR IN RCRD: CPT | Performed by: OTOLARYNGOLOGY

## 2025-05-27 NOTE — PROGRESS NOTES
Subjective   Patient ID: Isabelle Abel is a 64 y.o. female  HPI  Patient presents for follow-up for history of dizziness and asymmetric sensorineural hearing loss on the left side.  She is complaining of positional when placing her head down the left side over the last few weeks.  She has not noticed any change in her hearing.  She has no otalgia and no otorrhea.  Review of Systems    Objective   Physical Exam  The ear canals and the tympanic membranes are clear and mobile.  There is no spontaneous nystagmus noted.  The Montfort-Hallpike maneuver was performed and was noted to be positive on the left side with latency and fatigue.  The Epley maneuver was subsequently offered and the risks and benefits were explained and the patient agreed to proceed and the maneuver was performed and she was noted to have an immediate response.    Canalith Repositioning for BPPV    Date/Time: 5/27/2025 10:23 AM    Performed by: Laura Armando MD  Authorized by: Laura Armando MD    Consent:     Consent obtained:  Verbal  Indications:     Indications:  Benign positional vertigo        Assessment/Plan   Diagnoses and all orders for this visit:  ASNHL (asymmetrical sensorineural hearing loss) (Primary)  -     Comprehensive hearing test; Future  Sensorineural hearing loss (SNHL) of left ear with unrestricted hearing of right ear  -     Comprehensive hearing test; Future  Empty sella syndrome (Multi)  Dizziness  Obstructive sleep apnea  Benign paroxysmal positional vertigo, left  -     Canalith Repositioning for BPPV     1.  Mild asymmetric sensorineural hearing loss on the right side with no intracranial lesion noted on the MRI of the brain and IACs in August 2024.  A repeat hearing test was ordered today.  2.  Multifactorial dizziness and imbalance secondary to combination of orthostasis and benign positional vertigo on the left side treated with the Epley maneuver today.  3.  Incidentally noted empty sella surgery.  The patient was  previously referred to endocrinology to rule out any pituitary hormone imbalances.  She will follow-up in 2 weeks.

## 2025-06-04 ENCOUNTER — HOSPITAL ENCOUNTER (EMERGENCY)
Facility: HOSPITAL | Age: 65
Discharge: HOME | End: 2025-06-04
Payer: MEDICARE

## 2025-06-04 VITALS
BODY MASS INDEX: 37.49 KG/M2 | WEIGHT: 225 LBS | HEART RATE: 78 BPM | HEIGHT: 65 IN | RESPIRATION RATE: 18 BRPM | SYSTOLIC BLOOD PRESSURE: 143 MMHG | DIASTOLIC BLOOD PRESSURE: 80 MMHG | TEMPERATURE: 98.2 F | OXYGEN SATURATION: 98 %

## 2025-06-04 DIAGNOSIS — S01.312A EAR LOBE LACERATION, LEFT, INITIAL ENCOUNTER: Primary | ICD-10-CM

## 2025-06-04 DIAGNOSIS — E11.9 CONTROLLED TYPE 2 DIABETES MELLITUS WITHOUT COMPLICATION, WITHOUT LONG-TERM CURRENT USE OF INSULIN: ICD-10-CM

## 2025-06-04 PROCEDURE — 2500000001 HC RX 250 WO HCPCS SELF ADMINISTERED DRUGS (ALT 637 FOR MEDICARE OP): Performed by: PHYSICIAN ASSISTANT

## 2025-06-04 PROCEDURE — 99283 EMERGENCY DEPT VISIT LOW MDM: CPT

## 2025-06-04 PROCEDURE — 12011 RPR F/E/E/N/L/M 2.5 CM/<: CPT | Performed by: PHYSICIAN ASSISTANT

## 2025-06-04 RX ORDER — CEPHALEXIN 500 MG/1
500 CAPSULE ORAL 4 TIMES DAILY
Qty: 28 CAPSULE | Refills: 0 | Status: SHIPPED | OUTPATIENT
Start: 2025-06-04 | End: 2025-06-11

## 2025-06-04 RX ADMIN — Medication 1 APPLICATION: at 12:07

## 2025-06-04 ASSESSMENT — PAIN - FUNCTIONAL ASSESSMENT: PAIN_FUNCTIONAL_ASSESSMENT: 0-10

## 2025-06-04 ASSESSMENT — PAIN SCALES - GENERAL: PAINLEVEL_OUTOF10: 6

## 2025-06-04 NOTE — ED PROVIDER NOTES
HPI     CC: Ear Injury     HPI: Isabelle Abel is a 64 y.o. female with past medical history of BPPV, ADILSON, type 2 diabetes, hypercholesterolemia, COPD, CAD, gout, and vitamin D deficiency presents with concern for trauma to the left earlobe.  Patient reports that she was at one of her properties and was with the dog who was in a crate.  She bent down to help the dog and her earring got caught on the crate and ripped through her left earlobe.  She states that her earlobes have always been very stretched from earrings, so she has not been that surprised.  She is somewhat disappointed that she has some plans coming up for which she would like to wear earrings.  Reports no other acute trauma.    ROS: 10-point review of systems was performed and is otherwise negative except as noted in HPI.      Past Medical History: Noncontributory except per HPI     Past Surgical History: Noncontributory except per HPI     Family History: Reviewed and noncontributory     Social History:  Noncontributory except per HPI       RX Allergies[1]    Medical History[2]    Home Meds:   Current Outpatient Medications   Medication Instructions    albuterol 90 mcg/actuation inhaler 1-2 puffs, inhalation, Every 4 hours PRN    allopurinol (ZYLOPRIM) 300 mg, oral, Daily    aspirin 81 mg, oral, Daily    atorvastatin (LIPITOR) 80 mg, oral, Daily    azelastine (Astelin) 137 mcg (0.1 %) nasal spray 1 spray, Each Nostril, 2 times daily    blood-glucose meter (OneTouch Ultra2 Meter) misc Use to check glucose levels twice a day    cephalexin (KEFLEX) 500 mg, oral, 4 times daily    clotrimazole-betamethasone (Lotrisone) cream 1 Application, 2 times daily    dorzolamide (Trusopt) 2 % ophthalmic solution No dose, route, or frequency recorded.    fluticasone-umeclidin-vilanter (Trelegy Ellipta) 200-62.5-25 mcg blister with device 1 puff, inhalation, Daily    ipratropium-albuteroL (Duo-Neb) 0.5-2.5 mg/3 mL nebulizer solution 3 mL, Every 6 hours PRN    lancets  "(OneTouch Delica Plus Lancet) 30 gauge misc 2 Lancets, miscellaneous, Daily    metFORMIN (GLUCOPHAGE) 500 mg, oral, Daily with breakfast    nitroglycerin (NITROSTAT) 0.4 mg, Every 5 min PRN    OneTouch Delica Plus Lancet 33 gauge misc use 2 lancets daily    OneTouch Verio test strips strip use 2 strips daily    sodium,potassium,mag sulfates (Suprep) 17.5-3.13-1.6 gram solution Take one bottle beginning at 6pm night before procedure and then take the other bottle 5 hours before procedure time as directed per instruction sheet    triamcinolone (Kenalog) 0.1 % cream 2 times daily        ED Triage Vitals [06/04/25 1100]   Temperature Heart Rate Respirations BP   36.5 °C (97.7 °F) (!) 103 16 153/80      Pulse Ox Temp src Heart Rate Source Patient Position   98 % -- -- --      BP Location FiO2 (%)     -- --         Heart Rate:  []   Temperature:  [36.5 °C (97.7 °F)-36.8 °C (98.2 °F)]   Respirations:  [16-18]   BP: (143-153)/(80)   Height:  [165.1 cm (5' 5\")]   Weight:  [102 kg (225 lb)]   Pulse Ox:  [98 %]      Physical Exam:  Physical Exam  Vitals and nursing note reviewed.   Constitutional:       General: She is not in acute distress.     Appearance: Normal appearance. She is not ill-appearing.   HENT:      Head: Normocephalic and atraumatic.      Right Ear: External ear normal.      Ears:        Comments: Right ear with significantly stretched earring hole.  Similar on left and linear split continued through the base of the earlobe.  No active bleeding.  No retained foreign bodies.  Minimal tenderness to palpation.     Nose: Nose normal.      Mouth/Throat:      Mouth: Mucous membranes are moist.   Eyes:      Extraocular Movements: Extraocular movements intact.      Conjunctiva/sclera: Conjunctivae normal.      Pupils: Pupils are equal, round, and reactive to light.   Cardiovascular:      Rate and Rhythm: Tachycardia present.   Pulmonary:      Effort: Pulmonary effort is normal. No respiratory distress. "   Abdominal:      General: There is no distension.   Musculoskeletal:         General: Normal range of motion.      Cervical back: Normal range of motion.   Skin:     General: Skin is warm and dry.   Neurological:      General: No focal deficit present.      Mental Status: She is alert and oriented to person, place, and time.   Psychiatric:         Mood and Affect: Mood normal.          Diagnostic Results        Labs Reviewed - No data to display      No orders to display                 No data recorded                Procedure  Laceration Repair    Performed by: Abbey Lowe PA-C  Authorized by: Abbey Lowe PA-C    Consent:     Consent obtained:  Verbal    Consent given by:  Patient    Risks, benefits, and alternatives were discussed: yes      Risks discussed:  Infection, need for additional repair, nerve damage, pain, poor cosmetic result, poor wound healing, retained foreign body, tendon damage and vascular damage    Alternatives discussed:  No treatment, delayed treatment, observation and referral  Universal protocol:     Procedure explained and questions answered to patient or proxy's satisfaction: yes      Relevant documents present and verified: yes      Test results available: yes      Imaging studies available: yes      Required blood products, implants, devices, and special equipment available: yes      Site/side marked: yes      Immediately prior to procedure, a time out was called: yes      Patient identity confirmed:  Verbally with patient  Anesthesia:     Anesthesia method:  Topical application    Topical anesthetic:  LET  Laceration details:     Location:  Ear    Ear location:  L ear    Length (cm):  0.5  Pre-procedure details:     Preparation:  Patient was prepped and draped in usual sterile fashion  Exploration:     Limited defect created (wound extended): no      Hemostasis achieved with:  Direct pressure    Wound exploration: wound explored through full range of motion and entire depth of  wound visualized      Wound extent: no foreign body, no nerve damage, no tendon damage, no underlying fracture and no vascular damage      Contaminated: no    Treatment:     Area cleansed with:  Saline    Amount of cleaning:  Standard    Irrigation solution:  Sterile saline    Irrigation method:  Syringe    Visualized foreign bodies/material removed: no      Debridement:  None    Undermining:  None  Skin repair:     Repair method:  Sutures    Suture size:  5-0    Suture material:  Prolene    Suture technique:  Simple interrupted    Number of sutures:  3  Approximation:     Approximation:  Close  Repair type:     Repair type:  Simple  Post-procedure details:     Dressing:  Adhesive bandage    Procedure completion:  Tolerated      ED Course & MDM   Assessment/Plan:     Medications   lidocaine-racepinephrine-tetracaine (LET) 4-0.05-0.5 % gel 1 Application (1 Application Topical Given 6/4/25 1207)        Diagnoses as of 06/04/25 1521   Ear lobe laceration, left, initial encounter       Medical Decision Making    Isabelle Abel is a 64 y.o. female with past medical history of BPPV, ADILSON, type 2 diabetes, hypercholesterolemia, COPD, CAD, gout, and vitamin D deficiency presents with concern for trauma to the left earlobe.  Patient is nontoxic-appearing and vital signs are notable for mild tachycardia at a rate of 103.  Based on symptoms and presentation, differential diagnosis includes retained foreign body to left earlobe, left earlobe trauma, early infection.  Discussed with the patient the options.  We discussed that more than likely, both of her earlobes need a revision given the severity of her stretching.  She is interested in this option as she is frequently concerned that this will happen.  We discussed that I will close the external defect as best as possible.  She wondered why I could not close the entire hole today.  We discussed that since the hole is through and through, she will likely need stitches anteriorly  and posteriorly and this is not something that we typically do in the emergency department.  Will close the new defect made today and refer her to see plastics.  Patient is agreeable.  Let was chosen so as to not distort the ear anatomy with injectable lidocaine.  Wound repaired, see procedure note.  Patient very happy with the outcome.  3 stitches were placed with plan for removal around 7 days.  No further workup indicated.    Laceration: Patient was educated that the sutures will need to be removed in approximately 5-7 days.  We discussed that she most likely will still need to see plastic surgery for revision given significant size of earring holes to begin with.  A primary care provider, urgent care, or emergency department can remove sutures for you.  Recommended local wound care with twice a day soap and water cleanse and keeping the wound dry 24 hours after leaving the emergency department.  Recommended monitoring for signs and symptoms of infection including redness, drainage, warmth, increased pain, or fever.  Patient will be given prophylactic antibiotics of Keflex since she is diabetic and the earring was likely unclean.  Pain control can be achieved with Tylenol, Motrin, or ice to the area.  Advised her not to wear earrings until seen by plastic surgery.  Also advised against swimming in pools or submerging head in water until fully healed.  Recommended returning to the emergency department for any new or worsening symptoms.  Patient was agreeable to plan of care and felt comfortable returning home.     Disposition: Home    ED Prescriptions       Medication Sig Dispense Start Date End Date Auth. Provider    cephalexin (Keflex) 500 mg capsule Take 1 capsule (500 mg) by mouth 4 times a day for 7 days. 28 capsule 6/4/2025 6/11/2025 Abbey Lowe PA-C            Social Determinants Affecting Care: none     Abbey Lowe PA-C    This note was dictated by speech recognition. Minor errors in transcription  may be present.       [1]   Allergies  Allergen Reactions    Ciprofloxacin Hcl Itching   [2]   Past Medical History:  Diagnosis Date    Asthma     Encounter for follow-up examination after completed treatment for conditions other than malignant neoplasm 05/20/2021    Hospital discharge follow-up    Encounter for gynecological examination (general) (routine) without abnormal findings 03/11/2019    Encounter for annual routine gynecological examination    Hypertension     Non-ST elevation (NSTEMI) myocardial infarction (Multi) 02/28/2022    Non-ST elevation myocardial infarction (NSTEMI) in recovery phase    Personal history of other diseases of the respiratory system 05/19/2018    History of chronic obstructive lung disease    Zoster without complications 10/14/2020    Herpes zoster without complication        Abbey Lowe PA-C  06/04/25 1520

## 2025-06-05 DIAGNOSIS — E11.9 TYPE 2 DIABETES MELLITUS WITHOUT COMPLICATION, WITHOUT LONG-TERM CURRENT USE OF INSULIN: Primary | ICD-10-CM

## 2025-06-05 DIAGNOSIS — E11.9 CONTROLLED TYPE 2 DIABETES MELLITUS WITHOUT COMPLICATION, WITHOUT LONG-TERM CURRENT USE OF INSULIN: ICD-10-CM

## 2025-06-05 RX ORDER — LANCETS 33 GAUGE
EACH MISCELLANEOUS
Qty: 200 EACH | Refills: 0 | Status: SHIPPED | OUTPATIENT
Start: 2025-06-05

## 2025-06-05 RX ORDER — BLOOD-GLUCOSE METER
EACH MISCELLANEOUS
Qty: 200 STRIP | Refills: 0 | Status: SHIPPED | OUTPATIENT
Start: 2025-06-05

## 2025-06-05 RX ORDER — METFORMIN HYDROCHLORIDE 500 MG/1
500 TABLET ORAL
Qty: 90 TABLET | Refills: 0 | Status: SHIPPED | OUTPATIENT
Start: 2025-06-05

## 2025-06-05 RX ORDER — METFORMIN HYDROCHLORIDE 500 MG/1
500 TABLET ORAL
Qty: 90 TABLET | Refills: 0 | OUTPATIENT
Start: 2025-06-05

## 2025-06-07 NOTE — PROGRESS NOTES
AUDIOLOGIC EVALUATION    Name:  Isabelle Abel  :    1960  Age:     64 y.o.    HISTORY:     Patient was seen for repeat hearing test in conjunction with medical follow-up. Recall, most recent hearing test on file on 2024 revealed Hearing within normal limits. Left: Hearing essentially within normal limits. **Note  asymmetry with left worse compared to 500-1000 Hz. VNG on 2024 Overall normal vestibular examination; no evidence of active vestibular system involvement to account for patient reported symptoms. There were no indications of peripheral or central vestibular system pathway involvement. There were no indications of active Benign Paroxysmal Positional Vertigo (BPPV) present. Abnormal observation of gait and transfers given use of ambulatory device. Bedside postural control testing was deferred on this date due to observed abnormal gait.  Patient is at risk of falling based on observation of gait. Of note, evidence suggestive of possible Meniere's disease including present oVEMP responses at 2000 Hz in the right ear. Interpret with caution given patient reported history. Today, she reported [].    Denied hearing loss, tinnitus, otalgia, aural pressure/fullness, recent ear infections, otorrhea, dizziness, noise exposure, and [] chemotherapy/radiation, [] prior ear surgeries, head trauma, and family history of hearing loss.     EVALUATION:    Please see Audiogram.    RESULTS:    Otoscopy:  Right: Clear canal, normal color and appearance of tympanic membrane.    Left: Clear canal, normal color and appearance of tympanic membrane.     Tympanometry:  Right: Did not test.   Left: Did not test.     Hearing Sensitivity:  Right: Within normal limits hearing 250-8000 Hz.  Left: Within normal limits hearing to  Hz.    Word Recognition Score (NU-6 by difficulty):  Right: Excellent (100%) at 55 dBHL which is equal to average speech level (55-60 dBHL).  Left: Excellent (100%) at 55 dBHL which is  equal to average speech level (55-60 dBHL).    IMPRESSIONS:    Compared to previous hearing evaluation on 05/16/2024, hearing essentially remains stable, left ear asymmetry resolved on today's testing.    ASSESSMENT AND PLAN:    - Continue medical follow-up with Dr. Armando.  - Consider further evaluation/ [] monitoring of asymmetric hearing loss, unilateral tinnitus, and dizziness [] .  - If patient perceives an increased difficulty hearing, [] Pending medical clearance, [] consider hearing needs assessment to discuss management of hearing loss [] and tinnitus.  - Monitor and recheck hearing as warranted.  - Counseled regarding results and recommendations.    - Counseled regarding tinnitus and tinnitus management strategies (Provide soft background noise to help mask tinnitus. Reduce stress factors and consider lifestyle changes, including limiting caffeine, alcohol, and nicotine intake. Manage stress and anxiety, ensure adequate sleep, engage in regular exercise, and maintain a healthy diet).    - Counseled regarding noise exposure and use of hearing protection.    - Counseled regarding auditory processing, communication strategies, and considerations for further evaluation or management of these concerns.  Reducing background noise when possible.  Having others gain patient's attention prior to beginning a conversation.  Maintaining appropriate conversational distance (no more than 6 feet away) with minimal barriers.  Having conversational partners face the patient when they are talking to them.  Having conversational partners speak at an audible volume, clear dictation, and not too quickly.  Using facial cues (i.e. reading lips).    - [facility].     - Consider referral to [facility] hearing implant team for further discussion regarding implantable technologies and candidacy consideration.    - Continue use of amplification and follow-up as recommended for  hearing aid maintenance and adjustments.      Reba Chau.  Clinical Audiologist.

## 2025-06-10 ENCOUNTER — APPOINTMENT (OUTPATIENT)
Dept: AUDIOLOGY | Facility: CLINIC | Age: 65
End: 2025-06-10
Payer: COMMERCIAL

## 2025-06-10 ENCOUNTER — APPOINTMENT (OUTPATIENT)
Dept: OTOLARYNGOLOGY | Facility: CLINIC | Age: 65
End: 2025-06-10
Payer: COMMERCIAL

## 2025-06-10 VITALS — WEIGHT: 225 LBS | HEIGHT: 65 IN | BODY MASS INDEX: 37.49 KG/M2

## 2025-06-10 DIAGNOSIS — R42 DIZZINESS: ICD-10-CM

## 2025-06-10 DIAGNOSIS — H92.01 RIGHT EAR PAIN: ICD-10-CM

## 2025-06-10 DIAGNOSIS — H93.13 TINNITUS OF BOTH EARS: ICD-10-CM

## 2025-06-10 DIAGNOSIS — H90.3 ASNHL (ASYMMETRICAL SENSORINEURAL HEARING LOSS): Primary | ICD-10-CM

## 2025-06-10 DIAGNOSIS — G47.33 OBSTRUCTIVE SLEEP APNEA: ICD-10-CM

## 2025-06-10 DIAGNOSIS — H90.42 SENSORINEURAL HEARING LOSS (SNHL) OF LEFT EAR WITH UNRESTRICTED HEARING OF RIGHT EAR: ICD-10-CM

## 2025-06-10 DIAGNOSIS — H81.12 BENIGN PAROXYSMAL POSITIONAL VERTIGO, LEFT: ICD-10-CM

## 2025-06-10 DIAGNOSIS — R42 DIZZINESS: Primary | ICD-10-CM

## 2025-06-10 PROCEDURE — 1036F TOBACCO NON-USER: CPT | Performed by: OTOLARYNGOLOGY

## 2025-06-10 PROCEDURE — 3008F BODY MASS INDEX DOCD: CPT | Performed by: OTOLARYNGOLOGY

## 2025-06-10 PROCEDURE — 92556 SPEECH AUDIOMETRY COMPLETE: CPT

## 2025-06-10 PROCEDURE — 3044F HG A1C LEVEL LT 7.0%: CPT | Performed by: OTOLARYNGOLOGY

## 2025-06-10 PROCEDURE — 3050F LDL-C >= 130 MG/DL: CPT | Performed by: OTOLARYNGOLOGY

## 2025-06-10 PROCEDURE — 92552 PURE TONE AUDIOMETRY AIR: CPT

## 2025-06-10 PROCEDURE — 1160F RVW MEDS BY RX/DR IN RCRD: CPT | Performed by: OTOLARYNGOLOGY

## 2025-06-10 PROCEDURE — 1159F MED LIST DOCD IN RCRD: CPT | Performed by: OTOLARYNGOLOGY

## 2025-06-10 PROCEDURE — 99212 OFFICE O/P EST SF 10 MIN: CPT | Performed by: OTOLARYNGOLOGY

## 2025-06-10 NOTE — PROGRESS NOTES
Subjective   Patient ID: Isabelle Abel is a 65 y.o. female  HPI  Patient presents for follow-up for history of dizziness and asymmetric sensorineural hearing loss on the left side.  She is feeling much better following the Epley maneuver on the left side and the dizziness has resolved.  Review of Systems    Objective   Physical Exam  The ear canals and the tympanic membranes are clear and mobile.  The repeat hearing test today reveals mild sensorineural hearing loss on the left side.  The last hearing test from May 2024 was reviewed and there is no change of the hearing noted.    Procedures      Assessment/Plan   Diagnoses and all orders for this visit:  ASNHL (asymmetrical sensorineural hearing loss) (Primary)  Dizziness  Benign paroxysmal positional vertigo, left  Sensorineural hearing loss (SNHL) of left ear with unrestricted hearing of right ear  Obstructive sleep apnea     1.  Mild asymmetric sensorineural hearing loss on the right side with no intracranial lesion noted on the MRI of the brain and IACs in August 2024 and no change of the hearing noted today.  A repeat annual hearing test will be obtained in June 2026.  2.  Multifactorial dizziness and imbalance secondary to combination of orthostasis and benign positional vertigo on the left side resolved with the Epley maneuver on the left side.  3.  Incidentally noted empty sella surgery.  The patient was previously referred to endocrinology to rule out any pituitary hormone imbalances.  She will follow-up in 1 year.

## 2025-06-11 ENCOUNTER — HOSPITAL ENCOUNTER (OUTPATIENT)
Dept: RADIOLOGY | Facility: CLINIC | Age: 65
Discharge: HOME | End: 2025-06-11
Payer: MEDICARE

## 2025-06-11 ENCOUNTER — HOSPITAL ENCOUNTER (EMERGENCY)
Facility: HOSPITAL | Age: 65
Discharge: HOME | End: 2025-06-11
Payer: MEDICARE

## 2025-06-11 VITALS
DIASTOLIC BLOOD PRESSURE: 84 MMHG | RESPIRATION RATE: 18 BRPM | HEIGHT: 65 IN | WEIGHT: 225 LBS | SYSTOLIC BLOOD PRESSURE: 135 MMHG | BODY MASS INDEX: 37.49 KG/M2 | OXYGEN SATURATION: 98 % | TEMPERATURE: 98.6 F | HEART RATE: 97 BPM

## 2025-06-11 DIAGNOSIS — M81.0 POSTMENOPAUSAL OSTEOPOROSIS: ICD-10-CM

## 2025-06-11 DIAGNOSIS — Z51.89 ENCOUNTER FOR POST-TRAUMATIC WOUND CHECK: Primary | ICD-10-CM

## 2025-06-11 PROCEDURE — 77080 DXA BONE DENSITY AXIAL: CPT

## 2025-06-11 PROCEDURE — 77081 DXA BONE DENSITY APPENDICULR: CPT | Performed by: RADIOLOGY

## 2025-06-11 PROCEDURE — 99281 EMR DPT VST MAYX REQ PHY/QHP: CPT

## 2025-06-11 ASSESSMENT — PAIN - FUNCTIONAL ASSESSMENT: PAIN_FUNCTIONAL_ASSESSMENT: 0-10

## 2025-06-11 ASSESSMENT — PAIN SCALES - GENERAL: PAINLEVEL_OUTOF10: 0 - NO PAIN

## 2025-06-11 NOTE — ED PROVIDER NOTES
HPI     CC: Suture / Staple Removal     HPI: Isabelle Abel is a 65 y.o. female with past medical history of past medical history of BPPV, ADILSON, type 2 diabetes, hypercholesterolemia, COPD, CAD, gout, and vitamin D deficiency presents  for suture removal.  Patient was seen by myself on 6/4 for trauma to the left earlobe.  See previous notes.  No issues with healing.  3 sutures were placed.  Recommended return in 7 days for removal/evaluation.    ROS: 10-point review of systems was performed and is otherwise negative except as noted in HPI.      Past Medical History: Noncontributory except per HPI     Past Surgical History: Noncontributory except per HPI     Family History: Reviewed and noncontributory     Social History:  Noncontributory except per HPI       RX Allergies[1]    Medical History[2]    Home Meds:   Current Outpatient Medications   Medication Instructions    albuterol 90 mcg/actuation inhaler 1-2 puffs, inhalation, Every 4 hours PRN    allopurinol (ZYLOPRIM) 300 mg, oral, Daily    aspirin 81 mg, oral, Daily    atorvastatin (LIPITOR) 80 mg, oral, Daily    azelastine (Astelin) 137 mcg (0.1 %) nasal spray 1 spray, Each Nostril, 2 times daily    blood-glucose meter (OneTouch Ultra2 Meter) misc Use to check glucose levels twice a day    cephalexin (KEFLEX) 500 mg, oral, 4 times daily    clotrimazole-betamethasone (Lotrisone) cream 1 Application, 2 times daily    dorzolamide (Trusopt) 2 % ophthalmic solution No dose, route, or frequency recorded.    fluticasone-umeclidin-vilanter (Trelegy Ellipta) 200-62.5-25 mcg blister with device 1 puff, inhalation, Daily    ipratropium-albuteroL (Duo-Neb) 0.5-2.5 mg/3 mL nebulizer solution 3 mL, Every 6 hours PRN    metFORMIN (GLUCOPHAGE) 500 mg, oral, Daily with breakfast    nitroglycerin (NITROSTAT) 0.4 mg, Every 5 min PRN    OneTouch Delica Plus Lancet 33 gauge misc use 2 lancets daily    OneTouch Delica Plus Lancet 33 gauge misc use 2 lancets daily    OneTouch Verio  "test strips use 2 strips daily    sodium,potassium,mag sulfates (Suprep) 17.5-3.13-1.6 gram solution Take one bottle beginning at 6pm night before procedure and then take the other bottle 5 hours before procedure time as directed per instruction sheet    triamcinolone (Kenalog) 0.1 % cream 2 times daily        ED Triage Vitals [06/11/25 1616]   Temperature Heart Rate Respirations BP   37 °C (98.6 °F) 97 18 135/84      Pulse Ox Temp src Heart Rate Source Patient Position   98 % -- -- --      BP Location FiO2 (%)     -- --         Heart Rate:  [97]   Temperature:  [37 °C (98.6 °F)]   Respirations:  [18]   BP: (135)/(84)   Height:  [165.1 cm (5' 5\")]   Weight:  [102 kg (225 lb)]   Pulse Ox:  [98 %]      Physical Exam:  Physical Exam  Vitals and nursing note reviewed.   Constitutional:       General: She is not in acute distress.     Appearance: Normal appearance. She is not ill-appearing.   HENT:      Head: Normocephalic and atraumatic.      Right Ear: External ear normal.      Left Ear: External ear normal.      Ears:      Comments: Left ear: External ear with 3 sutures.  No signs of infection.  Partially well-healed.     Nose: Nose normal.      Mouth/Throat:      Mouth: Mucous membranes are moist.   Eyes:      Extraocular Movements: Extraocular movements intact.      Conjunctiva/sclera: Conjunctivae normal.      Pupils: Pupils are equal, round, and reactive to light.   Cardiovascular:      Rate and Rhythm: Normal rate.   Pulmonary:      Effort: Pulmonary effort is normal. No respiratory distress.   Abdominal:      General: There is no distension.   Musculoskeletal:         General: Normal range of motion.      Cervical back: Normal range of motion.   Skin:     General: Skin is warm and dry.   Neurological:      General: No focal deficit present.      Mental Status: She is alert and oriented to person, place, and time.   Psychiatric:         Mood and Affect: Mood normal.          Diagnostic Results        Labs " Reviewed - No data to display      No orders to display                 Martin Coma Scale Score: 15                  Procedure  Suture Removal    Performed by: Abbey Lowe PA-C  Authorized by: Abbey Lowe PA-C    Consent:     Consent obtained:  Verbal    Consent given by:  Patient    Risks, benefits, and alternatives were discussed: yes      Risks discussed:  Bleeding    Alternatives discussed:  No treatment  Universal protocol:     Procedure explained and questions answered to patient or proxy's satisfaction: yes      Relevant documents present and verified: yes      Test results available: yes      Imaging studies available: yes      Required blood products, implants, devices, and special equipment available: yes      Site/side marked: yes      Immediately prior to procedure, a time out was called: yes      Patient identity confirmed:  Verbally with patient  Location:     Location:  Head/neck    Head/neck location:  Ear    Ear location:  L ear  Procedure details:     Wound appearance:  No signs of infection, good wound healing and clean    Number of sutures removed:  1  Post-procedure details:     Post-removal:  No dressing applied    Procedure completion:  Tolerated  Comments:      2 remaining sutures      ED Course & MDM   Assessment/Plan:     Medications - No data to display     Diagnoses as of 06/11/25 1730   Encounter for post-traumatic wound check       Medical Decision Making    Isabelle Abel is a 65 y.o. female with past medical history of past medical history of BPPV, ADILSON, type 2 diabetes, hypercholesterolemia, COPD, CAD, gout, and vitamin D deficiency presents  for suture removal.  Patient is nontoxic-appearing and vital signs are normal.  Differential includes wound infection, normal healing wound, incomplete healing.  Wound was evaluated.  1 suture removed from the area where the original stretching of her earlobe piercing was.  This open immediately upon removal as it was likely part of the  original stretched hole.  While the rest of the area appears well sutured with a scab, I am still hesitant that this may pull apart.  For this reason, we will leave the stitches for 3-5 more days and have the patient present for reevaluation.  There are no signs of infection, swelling, redness, or warmth.  Patient has a plastic surgery appointment scheduled for June 24 for evaluation of possible closure of previously stretched earlobe piercings.  Patient is okay with this plan of care and will return within 5 days for repeat evaluation.    Disposition: Home    ED Prescriptions    None         Social Determinants Affecting Care: none     Abbey Lowe PA-C    This note was dictated by speech recognition. Minor errors in transcription may be present.       [1]   Allergies  Allergen Reactions    Ciprofloxacin Hcl Itching   [2]   Past Medical History:  Diagnosis Date    Asthma     Encounter for follow-up examination after completed treatment for conditions other than malignant neoplasm 05/20/2021    Hospital discharge follow-up    Encounter for gynecological examination (general) (routine) without abnormal findings 03/11/2019    Encounter for annual routine gynecological examination    Hypertension     Non-ST elevation (NSTEMI) myocardial infarction (Multi) 02/28/2022    Non-ST elevation myocardial infarction (NSTEMI) in recovery phase    Personal history of other diseases of the respiratory system 05/19/2018    History of chronic obstructive lung disease    Zoster without complications 10/14/2020    Herpes zoster without complication        Abbey Lowe PA-C  06/11/25 7017

## 2025-06-21 ENCOUNTER — HOSPITAL ENCOUNTER (EMERGENCY)
Facility: HOSPITAL | Age: 65
Discharge: HOME | End: 2025-06-21
Payer: MEDICARE

## 2025-06-21 VITALS
TEMPERATURE: 97.2 F | DIASTOLIC BLOOD PRESSURE: 87 MMHG | HEART RATE: 95 BPM | RESPIRATION RATE: 18 BRPM | BODY MASS INDEX: 37.49 KG/M2 | SYSTOLIC BLOOD PRESSURE: 148 MMHG | OXYGEN SATURATION: 98 % | WEIGHT: 225 LBS | HEIGHT: 65 IN

## 2025-06-21 DIAGNOSIS — R03.0 ELEVATED BLOOD PRESSURE READING: ICD-10-CM

## 2025-06-21 DIAGNOSIS — Z48.02 VISIT FOR SUTURE REMOVAL: Primary | ICD-10-CM

## 2025-06-21 PROCEDURE — 99282 EMERGENCY DEPT VISIT SF MDM: CPT

## 2025-06-21 PROCEDURE — 99281 EMR DPT VST MAYX REQ PHY/QHP: CPT

## 2025-06-21 ASSESSMENT — PAIN - FUNCTIONAL ASSESSMENT: PAIN_FUNCTIONAL_ASSESSMENT: 0-10

## 2025-06-21 ASSESSMENT — PAIN SCALES - GENERAL: PAINLEVEL_OUTOF10: 0 - NO PAIN

## 2025-06-21 NOTE — DISCHARGE INSTRUCTIONS
Return To the ED immediately if new or worsening signs or symptoms  Please follow with your primary care doctor within 3 days

## 2025-06-23 ENCOUNTER — APPOINTMENT (OUTPATIENT)
Dept: PLASTIC SURGERY | Facility: CLINIC | Age: 65
End: 2025-06-23
Payer: MEDICARE

## 2025-07-03 ENCOUNTER — OFFICE VISIT (OUTPATIENT)
Dept: PAIN MEDICINE | Facility: CLINIC | Age: 65
End: 2025-07-03
Payer: MEDICARE

## 2025-07-03 DIAGNOSIS — R20.0 NUMBNESS OF RIGHT ANTERIOR THIGH: ICD-10-CM

## 2025-07-03 DIAGNOSIS — M54.16 LUMBAR RADICULAR PAIN: Primary | ICD-10-CM

## 2025-07-03 PROCEDURE — 3044F HG A1C LEVEL LT 7.0%: CPT | Performed by: ANESTHESIOLOGY

## 2025-07-03 PROCEDURE — 3050F LDL-C >= 130 MG/DL: CPT | Performed by: ANESTHESIOLOGY

## 2025-07-03 PROCEDURE — 99204 OFFICE O/P NEW MOD 45 MIN: CPT | Performed by: ANESTHESIOLOGY

## 2025-07-03 RX ORDER — NORTRIPTYLINE HYDROCHLORIDE 10 MG/1
10 CAPSULE ORAL NIGHTLY
Qty: 30 CAPSULE | Refills: 3 | Status: SHIPPED | OUTPATIENT
Start: 2025-07-03 | End: 2025-10-31

## 2025-07-03 RX ORDER — TOPIRAMATE 25 MG/1
25-50 TABLET, FILM COATED ORAL NIGHTLY
Qty: 60 TABLET | Refills: 3 | Status: SHIPPED | OUTPATIENT
Start: 2025-07-03 | End: 2025-10-31

## 2025-07-03 NOTE — PROGRESS NOTES
Patient ID: Isabelle Abel is a 65 y.o. female with a past medical history of DM (A1c 6.2) and bilateral hip replacements who presents for initial evaluation of right low back and anterior thigh pain. Referred by Dr. Jiang.    History of bilateral hip replacement done by Dr. Jiang approximally 5 years ago. Reports her pain resolved completely. Patient reports approximately 6 months ago she started to develop right anterior thigh pain.  When she went back to see Dr. Jiang he assured her that her hardware was intact and thought the pain was likely coming from her back.  Thigh pain has worsened since onset, now burning and tingling.  Then she reports she developed low back pain approximately 1 month ago with radiation to the anterior thigh.  Pain and numbness do not go past the knee. No falls or weakness noted.   Onset, traumatic event: denies  Location: Right low back  Radiation: Right anterior thigh  Quality: sharp, stabbing, burning  Intensity: currently 8/10, at its worst 10/10  Exacerbated by: walking, standing  Relieved by: rest    Treatment To Date:  - Physical therapy: none     - Home exercise program after PT: none   - Previous injections/interventions: bilateral hip replacements  - Medications: Tylenol, Aleve, Gabapentin      Patient denies bowel/bladder incontinence, denies fever, denies unintentional weight loss, denies clumsiness of hands, feet, or dropping things.  Denies any constitutional or myelopathic symptomatology.                                   Imaging:  === 08/17/24 ===    MR IAC WO AND W CONTRAST    - Impression -  There are scattered nonspecific white matter changes noted within the  cerebral hemispheres bilaterally which while nonspecific, given the  patient's age, likely represent sequelae of more remote small-vessel  ischemic change.    Small foci of encephalomalacia are noted within the cerebellar  hemispheres bilaterally.    There is flattening of the pituitary gland along  the floor of the  sella compatible with an empty sella syndrome.    The gradient echo T2 weighted images demonstrate a couple of punctate  foci of blooming dark signal within the right cerebellar hemisphere  suggesting small foci of previous microhemorrhage and/or dystrophic  calcification/mineralization.    Lab Results   Component Value Date    HGBA1C 6.2 (H) 01/24/2025       Current Outpatient Medications   Medication Instructions    albuterol 90 mcg/actuation inhaler 1-2 puffs, inhalation, Every 4 hours PRN    allopurinol (ZYLOPRIM) 300 mg, oral, Daily    aspirin 81 mg, oral, Daily    atorvastatin (LIPITOR) 80 mg, oral, Daily    azelastine (Astelin) 137 mcg (0.1 %) nasal spray 1 spray, Each Nostril, 2 times daily    blood-glucose meter (OneTouch Ultra2 Meter) misc Use to check glucose levels twice a day    clotrimazole-betamethasone (Lotrisone) cream 1 Application, 2 times daily    dorzolamide (Trusopt) 2 % ophthalmic solution No dose, route, or frequency recorded.    fluticasone-umeclidin-vilanter (Trelegy Ellipta) 200-62.5-25 mcg blister with device 1 puff, inhalation, Daily    ipratropium-albuteroL (Duo-Neb) 0.5-2.5 mg/3 mL nebulizer solution 3 mL, Every 6 hours PRN    metFORMIN (GLUCOPHAGE) 500 mg, oral, Daily with breakfast    nitroglycerin (NITROSTAT) 0.4 mg, Every 5 min PRN    OneTouch Delica Plus Lancet 33 gauge misc use 2 lancets daily    OneTouch Delica Plus Lancet 33 gauge misc use 2 lancets daily    OneTouch Verio test strips use 2 strips daily    sodium,potassium,mag sulfates (Suprep) 17.5-3.13-1.6 gram solution Take one bottle beginning at 6pm night before procedure and then take the other bottle 5 hours before procedure time as directed per instruction sheet    triamcinolone (Kenalog) 0.1 % cream 2 times daily        Medical History[1]     Surgical History[2]     Family History[3]     RX Allergies[4]     Objective     There were no vitals filed for this visit.     Physical Exam    GENERAL  EXAM  Vital Signs: Vital signs to include heart rate, respiration rate, blood pressure, and temperature were reviewed.  General Appearance:  Awake, alert, healthy appearing, well developed, No acute distress.  Head: Normocephalic without evidence of head injury.  Neck: The appearance of the neck was normal without swelling with a midline trachea.  Eyes: The eyelids and eyebrows exhibited no abnormalities.  Pupils were not pin-point.  Sclera was without icterus.  Lungs: Respiration rhythm and depth was normal.  Respiratory movements were normal without labored breathing.  Cardiovascular: No peripheral edema was present.    Neurological: Patient was oriented to time, place, and person.  Speech was normal.  Balance, gait, and stance were unremarkable.    Psychiatric: Appearance was normal with appropriate dress.  Mood was euthymic and affect was normal.  Skin: Affected regions were without ecchymosis or skin lesions.    Musculoskeletal Exam:  RLE strength: 5/5 hip flexors, 5/5 knee extensors, 5/5 ankle dorsiflexors, 5/5 EHL, 5/5 ankle plantar flexors   LLE strength: 5/5 hip flexors, 5/5 knee extensors, 5/5 ankle dorsiflexors, 5/5 EHL, 5/5 ankle plantar flexors   No evidence of hyperreflexia, 2+ reflexes of bilateral Patella and Achilles  Diminished light touch and pinprick sensation to the right anterior thigh    Special Tests:   Straight Leg Raise negative, Femoral nerve stretch positive    Assessment/Plan   Problem List Items Addressed This Visit           ICD-10-CM    Numbness of right anterior thigh R20.0    Relevant Orders    Referral to Physical Therapy     Isabelle Abel is a 65 y.o. female with a past medical history of CKD (GFR 49), DM (A1c 6.2) and bilateral hip replacements who presents for initial evaluation of right low back pain and right anterior thigh pain. Physical exam is reassuring for lack of neurologic compromise. Pain is likely multifactorial with component of lumbar neuritis with possible  concomitant meralgia paresthetica, lumbar spondylosis, and myofascial pain.  Pain is intermittently severe and affecting activities of daily living.  Will send a referral for physical therapy with focus on core muscle and low back muscle strengthening and initiation of home exercise program.  We will obtain an x-ray of the lumbar spine with flexion-extension views.  Patient does have history of chronic kidney disease, so we will avoid nephrotoxic medication at this time.  We will start nortriptyline 10 mg at bedtime, instructions given to patient that if she tolerates this well and does not have any side effects she can start Topamax 25 mg at bedtime after 2 days.  Instructions given to titrate up to 50 mg at bedtime as tolerated.  Patient to follow-up after finishing course of physical therapy, if no improvement consider lumbar MRI to further evaluate and possible interlaminar epidural steroid injection.  Patient denies any questions or concerns at this time.    Plan:   - Referral provided for physical therapy with focus on core and low back muscle strengthening, initiation of a home exercise program  - Lumbar X-ray w/ flexion/extension views ordered  - Last GFR 49, avoid nephrotoxic medication  - Nortriptyline 10mg at bedtime start first  - Topamax 25mg at bedtime then can add, increase as tolerated to 50mg at bedtime   Goals of therapy, the dosages and side effects of the medication were discussed in detail with the patient.  The patient understands and agrees to take the medication as prescribed.   - Consider Lumbar MRI in the future   - Follow-up after course of physical therapy    Aline Brand DO   Chronic Pain Fellow    Patient seen and examined with attending, Dr. Bella, who agrees with assessment and plan.           [1]   Past Medical History:  Diagnosis Date    Asthma     Encounter for follow-up examination after completed treatment for conditions other than malignant neoplasm 05/20/2021    [facility]  discharge follow-up    Encounter for gynecological examination (general) (routine) without abnormal findings 2019    Encounter for annual routine gynecological examination    Hypertension     Non-ST elevation (NSTEMI) myocardial infarction (Multi) 2022    Non-ST elevation myocardial infarction (NSTEMI) in recovery phase    Personal history of other diseases of the respiratory system 2018    History of chronic obstructive lung disease    Zoster without complications 10/14/2020    Herpes zoster without complication   [2]   Past Surgical History:  Procedure Laterality Date    BREAST BIOPSY      OTHER SURGICAL HISTORY  04/10/2014    Uterine Surgery    OTHER SURGICAL HISTORY  2019    Dilation and curettage    OTHER SURGICAL HISTORY  2019    Surgically induced     TOTAL HIP ARTHROPLASTY  2018    Hip Replacement   [3]   Family History  Problem Relation Name Age of Onset    Other (abdominal aneurysm) Mother      Heart attack Mother      Alzheimer's disease Father      Breast cancer Sister 50     Transient ischemic attack Sister 50     Breast cancer Sibling      Diabetes Maternal Cousin      Breast cancer Other mat relatives     Allergies Other     [4]   Allergies  Allergen Reactions    Ciprofloxacin Hcl Itching

## 2025-07-09 NOTE — PROGRESS NOTES
"  Division of Plastic and Reconstructive Surgery  Clinic Visit Note    Patient Name: Isabelle Abel  MRN: [id number]  Date:  07/14/2025     [facility]    Isabelle Abel is a 65 y.o. female with a past medical history of BPPV, ADILSON, type 2 diabetes, hypercholesterolemia, COPD, CAD, gout, and vitamin D deficiency who presents to the plastic surgery clinic via referral from the ED for evaluation of a left ear lobe traumatic wound. Per chart review, patient sustained the injury on 6/4/25 when her earring became stuck on a dog crate and tore through the lobe of her ear which had already been demonstrating some signs of stretching. The ED attempted repair of the site via placemen of 3 non-absorbable sutures, however, during her ED follow up for suture removal on 6/11, the wound appeared to re-open following removal of one of the sutures. The decision was made to leave the remaining 2 sutures in place and have patient follow up with plastic surgery for further evaluation and possible revision.     Subjective    Doing well overall but she is interested in earlobe revision to avoid risk of another tearing and further stretching     Medical History[1] last A1c 6.2  Surgical History[2]  Allergies[3]  Current Medications[4] Not taking aspirin \"recently\"  Family History[5]  Social History[6]    ROS: All 10 systems were reviewed and are unremarkable except for those mentioned in HPI.      Objective   Blood pressure 125/78, pulse 92, height 1.664 m (5' 5.5\"), weight 104 kg (230 lb), SpO2 99%.       Physical Exam  Constitutional: A&Ox3, calm and cooperative, NAD.  Eyes: EOMI, clear sclera.  ENMT: Moist mucous membranes, no apparent injuries or lesions.  Head/Neck: NC/AT. Neck supple.   Cardiovascular: Normal rate and regular rhythm. 2+ equal pulses of the distal extremities.  Respiratory/Thorax: Breathing comfortably with regular respirations on RA. Good symmetric chest expansion.   Gastrointestinal: Abdomen soft, non-tender. "   Genitourinary: Voiding independently.   Extremities: No peripheral edema. Moves all 4 extremities actively, strength appropriate.   Neurological: A&Ox3.   Psychological: Appropriate mood and behavior.   Skin: bilateral ear lobes with significant stretching and poor skin quality consistent with years of earring use with increased weight/pull on surrounding ear lobe tissue. No other skin changes     Assessment   Isabelle Abel is a 65 y.o. female hx of a traumatic ear lobe tear on 6/4 status post attempted closure per the ED with subsequent dehiscence who presents to the plastic surgery clinic for further evaluation and consideration of possible ear lobe repair revision.    Plan    - interested in ear lobe repair  - reviewed procedure in detail and recovery time  - reviewed need for new piercing after healing of ear lobe repairs  - reviewed that application to insurance is not a guarantee for coverage  - recommend calling insurance with CPT code 24236 to review coverage  - patient to call office if interested in next steps for scheduling  - RTC at time of procedure if interested    Isaura Lofton PA-C  Plastic and Reconstructive Surgery           [1]   Past Medical History:  Diagnosis Date    Asthma     Encounter for follow-up examination after completed treatment for conditions other than malignant neoplasm 05/20/2021    Hospital discharge follow-up    Encounter for gynecological examination (general) (routine) without abnormal findings 03/11/2019    Encounter for annual routine gynecological examination    Hypertension     Non-ST elevation (NSTEMI) myocardial infarction (Multi) 02/28/2022    Non-ST elevation myocardial infarction (NSTEMI) in recovery phase    Personal history of other diseases of the respiratory system 05/19/2018    History of chronic obstructive lung disease    Zoster without complications 10/14/2020    Herpes zoster without complication   [2]   Past Surgical History:  Procedure Laterality Date     BREAST BIOPSY      OTHER SURGICAL HISTORY  04/10/2014    Uterine Surgery    OTHER SURGICAL HISTORY  2019    Dilation and curettage    OTHER SURGICAL HISTORY  2019    Surgically induced     TOTAL HIP ARTHROPLASTY  2018    Hip Replacement   [3]   Allergies  Allergen Reactions    Ciprofloxacin Hcl Itching   [4]   Current Outpatient Medications:     albuterol 90 mcg/actuation inhaler, Inhale 1-2 puffs every 4 hours if needed for shortness of breath or wheezing., Disp: 18 g, Rfl: 11    allopurinol (Zyloprim) 300 mg tablet, Take 1 tablet (300 mg) by mouth once daily., Disp: 90 tablet, Rfl: 1    aspirin 81 mg EC tablet, TAKE ONE TABLET BY MOUTH DAILY, Disp: 90 tablet, Rfl: 0    atorvastatin (Lipitor) 80 mg tablet, Take 1 tablet (80 mg) by mouth once daily., Disp: 90 tablet, Rfl: 1    azelastine (Astelin) 137 mcg (0.1 %) nasal spray, Administer 1 spray into each nostril 2 times a day., Disp: 30 mL, Rfl: 0    blood-glucose meter (OneTouch Ultra2 Meter) misc, Use to check glucose levels twice a day, Disp: 1 each, Rfl: 0    clotrimazole-betamethasone (Lotrisone) cream, Apply 1 Application topically 2 times a day., Disp: , Rfl:     dorzolamide (Trusopt) 2 % ophthalmic solution, , Disp: , Rfl:     fluticasone-umeclidin-vilanter (Trelegy Ellipta) 200-62.5-25 mcg blister with device, Inhale 1 puff once daily., Disp: 30 each, Rfl: 5    ipratropium-albuteroL (Duo-Neb) 0.5-2.5 mg/3 mL nebulizer solution, Take 3 mL by nebulization every 6 hours if needed., Disp: , Rfl:     metFORMIN (Glucophage) 500 mg tablet, Take 1 tablet (500 mg) by mouth once daily with breakfast., Disp: 90 tablet, Rfl: 0    nitroglycerin (Nitrostat) 0.4 mg SL tablet, Place 1 tablet (0.4 mg) under the tongue every 5 minutes if needed. FOR UP TO 3 DOSES AS NEEDED FOR CHEST PAIN.CALL 911 IF PAIN PERSISTS., Disp: , Rfl:     nortriptyline (Pamelor) 10 mg capsule, Take 1 capsule (10 mg) by mouth once daily at bedtime., Disp: 30 capsule,  Rfl: 3    OneTouch Delica Plus Lancet 33 gauge misc, use 2 lancets daily, Disp: , Rfl:     OneTouch Delica Plus Lancet 33 gauge misc, use 2 lancets daily, Disp: 200 each, Rfl: 0    OneTouch Verio test strips, use 2 strips daily, Disp: 200 strip, Rfl: 0    sodium,potassium,mag sulfates (Suprep) 17.5-3.13-1.6 gram solution, Take one bottle beginning at 6pm night before procedure and then take the other bottle 5 hours before procedure time as directed per instruction sheet, Disp: 1 each, Rfl: 0    topiramate (Topamax) 25 mg tablet, Take 1-2 tablets (25-50 mg) by mouth once daily at bedtime., Disp: 60 tablet, Rfl: 3    triamcinolone (Kenalog) 0.1 % cream, Apply topically twice a day. APPLY AND RUB IN A THIN FILM TO AFFECTED AREAS TWICE DAILY.(AM AND PM)., Disp: , Rfl:   [5]   Family History  Problem Relation Name Age of Onset    Other (abdominal aneurysm) Mother      Heart attack Mother      Alzheimer's disease Father      Breast cancer Sister 50     Transient ischemic attack Sister 50     Breast cancer Sibling      Diabetes Maternal Cousin      Breast cancer Other mat relatives     Allergies Other     [6]   Social History  Tobacco Use    Smoking status: Former     Types: Cigarettes    Smokeless tobacco: Never   Vaping Use    Vaping status: Never Used   Substance Use Topics    Alcohol use: Not Currently    Drug use: Never

## 2025-07-14 ENCOUNTER — APPOINTMENT (OUTPATIENT)
Dept: PLASTIC SURGERY | Facility: CLINIC | Age: 65
End: 2025-07-14
Payer: MEDICARE

## 2025-07-14 ENCOUNTER — HOSPITAL ENCOUNTER (OUTPATIENT)
Dept: RADIOLOGY | Facility: CLINIC | Age: 65
Discharge: HOME | End: 2025-07-14
Payer: MEDICARE

## 2025-07-14 VITALS — WEIGHT: 224.87 LBS | HEIGHT: 65 IN | BODY MASS INDEX: 37.47 KG/M2

## 2025-07-14 VITALS
HEIGHT: 66 IN | SYSTOLIC BLOOD PRESSURE: 125 MMHG | DIASTOLIC BLOOD PRESSURE: 78 MMHG | BODY MASS INDEX: 36.96 KG/M2 | OXYGEN SATURATION: 99 % | HEART RATE: 92 BPM | WEIGHT: 230 LBS

## 2025-07-14 DIAGNOSIS — Z12.31 ENCOUNTER FOR SCREENING MAMMOGRAM FOR BREAST CANCER: ICD-10-CM

## 2025-07-14 DIAGNOSIS — S01.312A EAR LOBE LACERATION, LEFT, INITIAL ENCOUNTER: ICD-10-CM

## 2025-07-14 PROCEDURE — 77067 SCR MAMMO BI INCL CAD: CPT

## 2025-07-14 PROCEDURE — 3074F SYST BP LT 130 MM HG: CPT

## 2025-07-14 PROCEDURE — 99212 OFFICE O/P EST SF 10 MIN: CPT

## 2025-07-14 PROCEDURE — 3008F BODY MASS INDEX DOCD: CPT

## 2025-07-14 PROCEDURE — 3078F DIAST BP <80 MM HG: CPT

## 2025-07-14 PROCEDURE — 77067 SCR MAMMO BI INCL CAD: CPT | Performed by: STUDENT IN AN ORGANIZED HEALTH CARE EDUCATION/TRAINING PROGRAM

## 2025-07-14 PROCEDURE — 3050F LDL-C >= 130 MG/DL: CPT

## 2025-07-14 PROCEDURE — 3044F HG A1C LEVEL LT 7.0%: CPT

## 2025-07-14 PROCEDURE — 77063 BREAST TOMOSYNTHESIS BI: CPT | Performed by: STUDENT IN AN ORGANIZED HEALTH CARE EDUCATION/TRAINING PROGRAM

## 2025-07-14 PROCEDURE — 1126F AMNT PAIN NOTED NONE PRSNT: CPT

## 2025-07-14 PROCEDURE — 1159F MED LIST DOCD IN RCRD: CPT

## 2025-07-14 PROCEDURE — 1036F TOBACCO NON-USER: CPT

## 2025-07-14 ASSESSMENT — PAIN SCALES - GENERAL: PAINLEVEL_OUTOF10: 0-NO PAIN

## 2025-07-23 ENCOUNTER — APPOINTMENT (OUTPATIENT)
Dept: PRIMARY CARE | Facility: CLINIC | Age: 65
End: 2025-07-23
Payer: COMMERCIAL

## 2025-07-28 DIAGNOSIS — U07.1 COVID-19: Primary | ICD-10-CM

## 2025-07-28 RX ORDER — NIRMATRELVIR AND RITONAVIR 300-100 MG
3 KIT ORAL 2 TIMES DAILY
Qty: 30 TABLET | Refills: 0 | Status: SHIPPED | OUTPATIENT
Start: 2025-07-28 | End: 2025-08-02

## 2025-08-18 ENCOUNTER — APPOINTMENT (OUTPATIENT)
Dept: PRIMARY CARE | Facility: CLINIC | Age: 65
End: 2025-08-18
Payer: MEDICARE

## 2025-08-18 VITALS
HEART RATE: 88 BPM | DIASTOLIC BLOOD PRESSURE: 72 MMHG | WEIGHT: 227.96 LBS | TEMPERATURE: 97 F | RESPIRATION RATE: 18 BRPM | OXYGEN SATURATION: 95 % | SYSTOLIC BLOOD PRESSURE: 117 MMHG | BODY MASS INDEX: 37.36 KG/M2

## 2025-08-18 DIAGNOSIS — M1A.9XX0 CHRONIC GOUT INVOLVING TOE WITHOUT TOPHUS, UNSPECIFIED CAUSE, UNSPECIFIED LATERALITY: ICD-10-CM

## 2025-08-18 DIAGNOSIS — I10 BENIGN ESSENTIAL HYPERTENSION: ICD-10-CM

## 2025-08-18 DIAGNOSIS — E78.00 HYPERCHOLESTEROLEMIA: ICD-10-CM

## 2025-08-18 DIAGNOSIS — E11.9 TYPE 2 DIABETES MELLITUS WITHOUT COMPLICATION, WITHOUT LONG-TERM CURRENT USE OF INSULIN: Primary | ICD-10-CM

## 2025-08-18 PROCEDURE — 3078F DIAST BP <80 MM HG: CPT | Performed by: INTERNAL MEDICINE

## 2025-08-18 PROCEDURE — G2211 COMPLEX E/M VISIT ADD ON: HCPCS | Performed by: INTERNAL MEDICINE

## 2025-08-18 PROCEDURE — 3074F SYST BP LT 130 MM HG: CPT | Performed by: INTERNAL MEDICINE

## 2025-08-18 PROCEDURE — 1159F MED LIST DOCD IN RCRD: CPT | Performed by: INTERNAL MEDICINE

## 2025-08-18 PROCEDURE — 99214 OFFICE O/P EST MOD 30 MIN: CPT | Performed by: INTERNAL MEDICINE

## 2025-08-18 PROCEDURE — 1160F RVW MEDS BY RX/DR IN RCRD: CPT | Performed by: INTERNAL MEDICINE

## 2025-08-18 PROCEDURE — 1126F AMNT PAIN NOTED NONE PRSNT: CPT | Performed by: INTERNAL MEDICINE

## 2025-08-18 ASSESSMENT — ENCOUNTER SYMPTOMS
OCCASIONAL FEELINGS OF UNSTEADINESS: 0
LOSS OF SENSATION IN FEET: 1
DEPRESSION: 0

## 2025-08-18 ASSESSMENT — PAIN SCALES - GENERAL: PAINLEVEL_OUTOF10: 0-NO PAIN

## 2025-08-19 ASSESSMENT — ENCOUNTER SYMPTOMS: BACK PAIN: 1

## 2025-08-30 LAB
ALBUMIN SERPL-MCNC: 4 G/DL (ref 3.6–5.1)
ALBUMIN/CREAT UR: 3 MG/G CREAT
ALP SERPL-CCNC: 81 U/L (ref 37–153)
ALT SERPL-CCNC: 14 U/L (ref 6–29)
ANION GAP SERPL CALCULATED.4IONS-SCNC: 7 MMOL/L (CALC) (ref 7–17)
AST SERPL-CCNC: 16 U/L (ref 10–35)
BILIRUB SERPL-MCNC: 0.7 MG/DL (ref 0.2–1.2)
BUN SERPL-MCNC: 19 MG/DL (ref 7–25)
CALCIUM SERPL-MCNC: 9.4 MG/DL (ref 8.6–10.4)
CHLORIDE SERPL-SCNC: 108 MMOL/L (ref 98–110)
CHOLEST SERPL-MCNC: 165 MG/DL
CHOLEST/HDLC SERPL: 3.6 (CALC)
CO2 SERPL-SCNC: 28 MMOL/L (ref 20–32)
CREAT SERPL-MCNC: 1.21 MG/DL (ref 0.5–1.05)
CREAT UR-MCNC: 153 MG/DL (ref 20–275)
EGFRCR SERPLBLD CKD-EPI 2021: 49 ML/MIN/1.73M2
EST. AVERAGE GLUCOSE BLD GHB EST-MCNC: 137 MG/DL
EST. AVERAGE GLUCOSE BLD GHB EST-SCNC: 7.6 MMOL/L
GLUCOSE SERPL-MCNC: 97 MG/DL (ref 65–99)
HBA1C MFR BLD: 6.4 %
HDLC SERPL-MCNC: 46 MG/DL
LDLC SERPL CALC-MCNC: 98 MG/DL (CALC)
MICROALBUMIN UR-MCNC: 0.4 MG/DL
NONHDLC SERPL-MCNC: 119 MG/DL (CALC)
POTASSIUM SERPL-SCNC: 5 MMOL/L (ref 3.5–5.3)
PROT SERPL-MCNC: 6.9 G/DL (ref 6.1–8.1)
SODIUM SERPL-SCNC: 143 MMOL/L (ref 135–146)
TRIGL SERPL-MCNC: 109 MG/DL
URATE SERPL-MCNC: 5.1 MG/DL (ref 2.5–7)

## 2026-01-27 ENCOUNTER — APPOINTMENT (OUTPATIENT)
Dept: PRIMARY CARE | Facility: CLINIC | Age: 66
End: 2026-01-27
Payer: MEDICARE